# Patient Record
Sex: FEMALE | Race: BLACK OR AFRICAN AMERICAN | Employment: UNEMPLOYED | URBAN - METROPOLITAN AREA
[De-identification: names, ages, dates, MRNs, and addresses within clinical notes are randomized per-mention and may not be internally consistent; named-entity substitution may affect disease eponyms.]

---

## 2017-01-12 ENCOUNTER — OFFICE VISIT (OUTPATIENT)
Dept: FAMILY MEDICINE CLINIC | Age: 52
End: 2017-01-12

## 2017-01-12 VITALS
HEIGHT: 65 IN | TEMPERATURE: 97.2 F | DIASTOLIC BLOOD PRESSURE: 84 MMHG | SYSTOLIC BLOOD PRESSURE: 140 MMHG | OXYGEN SATURATION: 98 % | WEIGHT: 165 LBS | RESPIRATION RATE: 18 BRPM | BODY MASS INDEX: 27.49 KG/M2 | HEART RATE: 85 BPM

## 2017-01-12 DIAGNOSIS — I10 ESSENTIAL HYPERTENSION WITH GOAL BLOOD PRESSURE LESS THAN 140/90: ICD-10-CM

## 2017-01-12 DIAGNOSIS — Z12.39 ENCOUNTER FOR SCREENING BREAST EXAMINATION: ICD-10-CM

## 2017-01-12 DIAGNOSIS — Z12.11 SCREEN FOR COLON CANCER: ICD-10-CM

## 2017-01-12 DIAGNOSIS — Z86.73 HISTORY OF CVA (CEREBROVASCULAR ACCIDENT): ICD-10-CM

## 2017-01-12 DIAGNOSIS — E78.5 HYPERLIPIDEMIA, UNSPECIFIED HYPERLIPIDEMIA TYPE: ICD-10-CM

## 2017-01-12 DIAGNOSIS — E11.9 TYPE 2 DIABETES MELLITUS WITHOUT COMPLICATION, WITHOUT LONG-TERM CURRENT USE OF INSULIN (HCC): Primary | ICD-10-CM

## 2017-01-12 RX ORDER — ATORVASTATIN CALCIUM 80 MG/1
80 TABLET, FILM COATED ORAL DAILY
Qty: 30 TAB | Refills: 3 | Status: SHIPPED | OUTPATIENT
Start: 2017-01-12 | End: 2017-02-23 | Stop reason: SDUPTHER

## 2017-01-12 RX ORDER — LOSARTAN POTASSIUM 100 MG/1
100 TABLET ORAL DAILY
Qty: 30 TAB | Refills: 3 | Status: SHIPPED | OUTPATIENT
Start: 2017-01-12 | End: 2017-02-23 | Stop reason: SDUPTHER

## 2017-01-12 RX ORDER — ASPIRIN 81 MG/1
81 TABLET ORAL DAILY
Qty: 30 TAB | Refills: 3 | Status: SHIPPED | OUTPATIENT
Start: 2017-01-12 | End: 2017-02-23 | Stop reason: SDUPTHER

## 2017-01-12 NOTE — PROGRESS NOTES
Chief Complaint   Patient presents with    Hypertension    Diabetes     Patient in for DM and HTN follow-up. 1. Have you been to the ER, urgent care clinic since your last visit? Hospitalized since your last visit? No    2. Have you seen or consulted any other health care providers outside of the [address] [place] [address] since your last visit? Include any pap smears or colon screening. No    HPI  Michelet Saha comes in for f/u care. 1) DM2: Patient is on janumet. She has not been very compliant with medication and has been out of these for 1 week. This is due to financial reasons. Would like prescription sent in to her pharmacy. 2) HTN: On cozaar, has been out of medication. I will refill this. Check CMP today. 3) Dyslipidemia: check lipid panel and refill medication. Past Medical History  Past Medical History   Diagnosis Date    Diabetes ([facility] [address]     Hyperlipidemia     Hypertension     Stroke [facility])      2012       Surgical History  No past surgical history on file. Medications  Current Outpatient Prescriptions   Medication Sig Dispense Refill    SITagliptin-metFORMIN (JANUMET XR) 100-1,000 mg TM24 Take 1 Tab by mouth daily. 30 Tab 3    losartan (COZAAR) 100 mg tablet Take 1 Tab by mouth daily. 30 Tab 3    atorvastatin (LIPITOR) 80 mg tablet Take 1 Tab by mouth daily. 30 Tab 3    aspirin delayed-release 81 mg tablet Take 1 Tab by mouth daily. 30 Tab 3       Allergies  No Known Allergies    Family History  No family history on file. Social History  Social History     Social History    Marital status: SINGLE     Spouse name: N/A    Number of children: N/A    Years of education: N/A     Occupational History    Not on file. Social History Main Topics    Smoking status: Current Every Day Smoker     Packs/day: 0.50     Types: Cigarettes    Smokeless tobacco: Never Used    Alcohol use 0.0 oz/week     0 Standard drinks or equivalent per week      Comment: occassionally     Drug use:  No  Sexual activity: No     Other Topics Concern    Not on file     Social History Narrative       Review of Systems  Review of Systems - History obtained from chart review and the patient  General ROS: negative for - chills or fever  Psychological ROS: negative  Ophthalmic ROS: negative  ENT ROS: negative for - nasal congestion or nasal discharge  Endocrine ROS: DM2  Respiratory ROS: no cough, shortness of breath, or wheezing  Cardiovascular ROS: no chest pain or dyspnea on exertion  Gastrointestinal ROS: no abdominal pain, change in bowel habits, or black or bloody stools  Genito-Urinary ROS: no dysuria, trouble voiding, or hematuria  Musculoskeletal ROS: negative  Neurological ROS: no TIA or stroke symptoms    Vital Signs  Visit Vitals    /84 (BP 1 Location: Left arm, BP Patient Position: Sitting)    Pulse 85    Temp 97.2 °F (36.2 °C) (Oral)    Resp 18    Ht 5' 5\" (1.651 m)    Wt 165 lb (74.8 kg)    SpO2 98%    BMI 27.46 kg/m2         Physical Exam  Physical Examination: General appearance - oriented to person, place, and time, normal appearing weight, acyanotic, in no respiratory distress and well hydrated  Mental status - alert, oriented to person, place, and time, normal mood, behavior, speech, dress, motor activity, and thought processes  Eyes - sclera anicteric  Mouth - mucous membranes moist, pharynx normal without lesions  Chest - clear to auscultation, no wheezes, rales or rhonchi, symmetric air entry, no tachypnea, retractions or cyanosis  Heart - normal rate, regular rhythm, normal S1, S2, no murmurs, rubs, clicks or gallops  Neurological - alert, oriented, normal speech, no focal findings or movement disorder noted, motor and sensory grossly normal bilaterally  Musculoskeletal - no joint tenderness, deformity or swelling  Extremities - no pedal edema noted      Diagnostics  Orders Placed This Encounter    [facility] BI SCREENING INCL CAD     Standing Status:   Future     Standing Expiration Date:   2/12/2018     Order Specific Question:   Reason for Exam     Answer:   breast ca screening    OCCULT BLOOD, IMMUNOASSAY (FIT)     Standing Status:   Future     Standing Expiration Date:   8/37/8595    METABOLIC PANEL, COMPREHENSIVE     Standing Status:   Future     Standing Expiration Date:   1/13/2018    HEMOGLOBIN A1C WITH EAG     Standing Status:   Future     Standing Expiration Date:   1/13/2018    LIPID PANEL     Standing Status:   Future     Standing Expiration Date:   1/13/2018    CBC WITH AUTOMATED DIFF     Standing Status:   Future     Standing Expiration Date:   1/13/2018    REFERRAL TO OPHTHALMOLOGY     Referral Priority:   Routine     Referral Type:   Consultation     Referral Reason:   Specialty Services Required    SITagliptin-metFORMIN (JANUMET XR) 100-1,000 mg TM24     Sig: Take 1 Tab by mouth daily. Dispense:  30 Tab     Refill:  3    losartan (COZAAR) 100 mg tablet     Sig: Take 1 Tab by mouth daily. Dispense:  30 Tab     Refill:  3    atorvastatin (LIPITOR) 80 mg tablet     Sig: Take 1 Tab by mouth daily. Dispense:  30 Tab     Refill:  3    aspirin delayed-release 81 mg tablet     Sig: Take 1 Tab by mouth daily. Dispense:  30 Tab     Refill:  3         Results  Results for orders placed or performed in visit on 10/12/16   CBC WITH AUTOMATED DIFF   Result Value Ref Range    WBC 10.6 3.4 - 10.8 x10E3/uL    RBC 4.55 3.77 - 5.28 x10E6/uL    HGB 11.8 11.1 - 15.9 g/dL    HCT 35.9 34.0 - 46.6 %    MCV 79 79 - 97 fL    MCH 25.9 (L) 26.6 - 33.0 pg    MCHC 32.9 31.5 - 35.7 g/dL    RDW 15.7 (H) 12.3 - 15.4 %    PLATELET 678 239 - 596 x10E3/uL    NEUTROPHILS 62 %    Lymphocytes 29 %    MONOCYTES 8 %    EOSINOPHILS 1 %    BASOPHILS 0 %    ABS. NEUTROPHILS 6.6 1.4 - 7.0 x10E3/uL    Abs Lymphocytes 3.1 0.7 - 3.1 x10E3/uL    ABS. MONOCYTES 0.9 0.1 - 0.9 x10E3/uL    ABS. EOSINOPHILS 0.1 0.0 - 0.4 x10E3/uL    ABS.  BASOPHILS 0.0 0.0 - 0.2 x10E3/uL    IMMATURE GRANULOCYTES 0 %    ABS. IMM. GRANS. 0.0 0.0 - 0.1 x10E3/uL   HEMOGLOBIN A1C W/O EAG   Result Value Ref Range    Hemoglobin A1c 7.4 (H) 4.8 - 5.6 %   METABOLIC PANEL, COMPREHENSIVE   Result Value Ref Range    Glucose 119 (H) 65 - 99 mg/dL    BUN 5 (L) 6 - 24 mg/dL    Creatinine 0.47 (L) 0.57 - 1.00 mg/dL    GFR est non- >59 mL/min/1.73    GFR est  >59 mL/min/1.73    BUN/Creatinine ratio 11 9 - 23    Sodium 139 134 - 144 mmol/L    Potassium 4.2 3.5 - 5.2 mmol/L    Chloride 98 97 - 108 mmol/L    CO2 23 18 - 29 mmol/L    Calcium 9.7 8.7 - 10.2 mg/dL    Protein, total 7.2 6.0 - 8.5 g/dL    Albumin 4.1 3.5 - 5.5 g/dL    GLOBULIN, TOTAL 3.1 1.5 - 4.5 g/dL    A-G Ratio 1.3 1.1 - 2.5    Bilirubin, total 0.5 0.0 - 1.2 mg/dL    Alk. phosphatase 100 39 - 117 IU/L    AST 10 0 - 40 IU/L    ALT 7 0 - 32 IU/L   MICROALBUMIN, UR, RAND W/ MICROALBUMIN/CREA RATIO   Result Value Ref Range    Creatinine, urine 99.6 Not Estab. mg/dL    Microalbumin, urine 13.4 Not Estab. ug/mL    Microalb/Creat ratio (ug/mg creat.) 13.5 0.0 - 30.0 mg/g creat       ASSESSMENT and PLAN    ICD-10-CM ICD-9-CM    1. Type 2 diabetes mellitus without complication, without long-term current use of insulin (HCC) E11.9 250.00 SITagliptin-metFORMIN (JANUMET XR) 100-1,000 mg TM24      aspirin delayed-release 81 mg tablet      REFERRAL TO OPHTHALMOLOGY      METABOLIC PANEL, COMPREHENSIVE      HEMOGLOBIN A1C WITH EAG      LIPID PANEL      CBC WITH AUTOMATED DIFF      METABOLIC PANEL, COMPREHENSIVE      HEMOGLOBIN A1C WITH EAG      LIPID PANEL      CBC WITH AUTOMATED DIFF   2. Essential hypertension with goal blood pressure less than 140/90 I10 401.9 losartan (COZAAR) 100 mg tablet   3. Hyperlipidemia, unspecified hyperlipidemia type E78.5 272.4 atorvastatin (LIPITOR) 80 mg tablet      LIPID PANEL   4. History of CVA (cerebrovascular accident) Z86.73 V12.54 aspirin delayed-release 81 mg tablet   5.  Encounter for screening breast examination Z12.39 V76.10 [facility] MAMMO BI SCREENING INCL CAD   10. Screen for colon cancer Z12.11 V76.51 OCCULT BLOOD, IMMUNOASSAY (FIT)      OCCULT BLOOD, IMMUNOASSAY (FIT)     lab results and schedule of future lab studies reviewed with patient  reviewed diet, exercise and weight control  very strongly urged to quit smoking to reduce cardiovascular risk  cardiovascular risk and specific lipid/LDL goals reviewed  reviewed medications and side effects in detail  specific diabetic recommendations: low cholesterol diet, weight control and daily exercise discussed, home glucose monitoring emphasized, all medications, side effects and compliance discussed carefully, glycohemoglobin and other lab monitoring discussed, long term diabetic complications discussed and patient urged in the strongest terms to quit smoking      I have discussed the diagnosis with the patient and the intended plan of care as seen in the above orders. The patient has received an after-visit summary and questions were answered concerning future plans. I have discussed medication, side effects, and warnings with the patient in detail. The patient verbalized understanding and is in agreement with the plan of care. The patient will contact the office with any additional concerns.     Caro Rouse MD

## 2017-01-12 NOTE — MR AVS SNAPSHOT
Visit Information Date & Time Provider Department Dept. Phone Encounter #  
 1/12/2017 10:00 AM Estuardoed Marvin Ambriz MD [facility] [phone] [id number] Follow-up Instructions Return in about 6 weeks (around 2/23/2017), or if symptoms worsen or fail to improve, for dm2, htn, smoking cessation. Upcoming Health Maintenance Date Due FOBT Q 1 YEAR AGE 50-75 9/20/2015 BREAST CANCER SCRN MAMMOGRAM 1/22/2016 LIPID PANEL Q1 11/12/2016 EYE EXAM RETINAL OR DILATED Q1 12/17/2016 HEMOGLOBIN A1C Q6M 4/12/2017 FOOT EXAM Q1 10/12/2017 MICROALBUMIN Q1 10/12/2017 PAP AKA CERVICAL CYTOLOGY 1/7/2018 DTaP/Tdap/Td series (2 - Td) 7/12/2026 Allergies as of 1/12/2017  Review Complete On: 1/12/2017 By: Pearl Lopez MD  
 No Known Allergies Current Immunizations  Reviewed on 11/15/2016 Name Date Influenza Vaccine (Quad) PF 10/12/2016 Pneumococcal Polysaccharide (PPSV-23) 10/12/2016 Tdap 7/12/2016 Not reviewed this visit You Were Diagnosed With   
  
 Codes Comments Encounter for screening breast examination    -  Primary ICD-10-CM: Z12.39 
ICD-9-CM: V76.10 Type 2 diabetes mellitus without complication, without long-term current use of insulin (HCC)     ICD-10-CM: E11.9 ICD-9-CM: 250.00 Essential hypertension with goal blood pressure less than 140/90     ICD-10-CM: I10 
ICD-9-CM: 401.9 Hyperlipidemia, unspecified hyperlipidemia type     ICD-10-CM: E78.5 ICD-9-CM: 272.4 History of CVA (cerebrovascular accident)     ICD-10-CM: Z86.73 
ICD-9-CM: V12.54 Screen for colon cancer     ICD-10-CM: Z12.11 ICD-9-CM: V76.51 Other hyperlipidemia     ICD-10-CM: E78.4 ICD-9-CM: 272.4 Vitals BP Pulse Temp Resp Height(growth percentile) Weight(growth percentile) 140/84 (BP 1 Location: Left arm, BP Patient Position: Sitting) 85 97.2 °F (36.2 °C) (Oral) 18 5' 5\" (1.651 m) 165 lb (74.8 kg) SpO2 BMI OB Status Smoking Status 98% 27.46 kg/m2 Having regular periods Current Every Day Smoker Vitals History BMI and BSA Data Body Mass Index Body Surface Area  
 27.46 kg/m 2 1.85 m 2 Preferred Pharmacy Pharmacy Name Phone [facility] [address] [place], [place] [address] [place] [phone] Your Updated Medication List  
  
   
This list is accurate as of: 1/12/17 10:28 AM.  Always use your most recent med list.  
  
  
  
  
 aspirin delayed-release 81 mg tablet Take 1 Tab by mouth daily. atorvastatin 80 mg tablet Commonly known as:  LIPITOR Take 1 Tab by mouth daily. losartan 100 mg tablet Commonly known as:  COZAAR Take 1 Tab by mouth daily. SITagliptin-metFORMIN 100-1,000 mg Tm24 Commonly known as:  JANUMET XR Take 1 Tab by mouth daily. Prescriptions Sent to Pharmacy Refills SITagliptin-metFORMIN (JANUMET XR) 100-1,000 mg TM24 3 Sig: Take 1 Tab by mouth daily. Class: Normal  
 Pharmacy: [facility] [employer] [facility] [address] [place] [address] Ph #: [phone] Route: Oral  
 losartan (COZAAR) 100 mg tablet 3 Sig: Take 1 Tab by mouth daily. Class: Normal  
 Pharmacy: [facility] [address] [place] [address] Ph #: [phone] Route: Oral  
 atorvastatin (LIPITOR) 80 mg tablet 3 Sig: Take 1 Tab by mouth daily. Class: Normal  
 Pharmacy: [facility] [address] [place] [address] Ph #: [phone] Route: Oral  
 aspirin delayed-release 81 mg tablet 3 Sig: Take 1 Tab by mouth daily. Class: Normal  
 Pharmacy: [facility] [address] [place] [address] Ph #: [phone] Route: Oral  
  
We Performed the Following REFERRAL TO OPHTHALMOLOGY [REF57 Custom] Comments:  
 Please evaluate patient for diabetes eye check. Follow-up Instructions Return in about 6 weeks (around 2/23/2017), or if symptoms worsen or fail to improve, for dm2, htn, smoking cessation. To-Do List   
 01/12/2017 Lab:  CBC WITH AUTOMATED DIFF   
  
 01/12/2017 Lab:  HEMOGLOBIN A1C WITH EAG   
  
 01/12/2017 Lab:  LIPID PANEL   
  
 01/12/2017 Imaging:  [facility] BI SCREENING INCL CAD   
  
 01/12/2017 Lab:  METABOLIC PANEL, COMPREHENSIVE   
  
 01/12/2017 Lab:  OCCULT BLOOD, IMMUNOASSAY (FIT) Referral Information Referral ID Referred By Referred To  
  
 [id number] Aiyana Sullivan N Not Available Visits Status Start Date End Date 1 New Request 1/12/17 1/12/18 If your referral has a status of pending review or denied, additional information will be sent to support the outcome of this decision. Patient Instructions Noninsulin Medicines for Type 2 Diabetes: Care Instructions Your Care Instructions There are different types of noninsulin medicines for diabetes. Each works in a different way to help you control your blood sugar. Some types help your body make insulin to lower your blood sugar. Others lower how much insulin your body needs. Some can slow how quickly your body digests sugars. And some can remove extra glucose through your urine. Some of these medicines are: · Alpha-glucosidase inhibitors. These keep starches from breaking down. This means that they lower the amount of glucose absorbed when you eat. They do not help your body make more insulin. So they will not cause low blood sugar unless they are used with other medicines for diabetes. They include acarbose and miglitol. · DPP-4 inhibitors. These help your body increase the level of insulin after eating.  They also help your body make less of a hormone that raises blood sugar. They include linagliptin, saxagliptin, and sitagliptin. · Incretin hormones (GLP-1 receptor agonists). Your body makes a protein that can raise your insulin level, lower your blood sugar, and make you less hungry. You can inject hormone medicines that work the same way as this protein. They include exenatide and liraglutide. · Meglitinides. These help your body release insulin. They also help slow how your body digests sugars. In this way, they prevent your blood sugar from rising too fast after you eat. They include nateglinide and repaglinide. · Metformin. This lowers how much glucose your liver makes. And it helps you respond better to insulin. It also lowers the amount of stored sugar that your liver releases when you are not eating. · Sodium glucose co-transporter 2 inhibitors (SGLT2 inhibitors). These help to remove extra glucose through your urine. They may also help some people lose weight. They include canagliflozin, dapagliflozin, and empagliflozin. · Sulfonylureas. These help your body release more insulin. Some work for many hours and can cause low blood sugar if you don't eat as you expected. They include glipizide and glyburide. · Thiazolidinediones. These reduce the amount of blood glucose. They also help you respond better to insulin. They include pioglitazone and rosiglitazone. You may need to take more than one medicine for diabetes. Two or more medicines may work better to lower your blood sugar level than just one medicine. Follow-up care is a key part of your treatment and safety. Be sure to make and go to all appointments, and call your doctor if you are having problems. It's also a good idea to know your test results and keep a list of the medicines you take. How can you care for yourself at home? · Eat a healthy diet. Get some exercise each day. This may help you to reduce how much medicine you need. · Do not take other prescription or over-the-counter medicines, vitamins, herbal products, or supplements without talking to your doctor first. Some medicines for type 2 diabetes can cause problems with other medicines or supplements. · Tell your doctor if you plan to get pregnant. Some of these drugs are not safe for pregnant women. · Be safe with medicines. Take your medicines exactly as prescribed. Meglitinides or sulfonylureas can cause your blood sugar to drop very low. Call your doctor if you think you are having a problem with your medicine. · Check your blood sugar levels often. You can use a glucose monitor. Keeping track can help you know how certain foods, activities, and medicines affect your blood sugar. And it can help you keep your blood sugar from getting so low that it's not safe. When should you call for help? Call 911 anytime you think you may need emergency care. For example, call if: 
· You passed out (lost consciousness), or you suddenly become very sleepy or confused. (You may have very low blood sugar.) · You have symptoms of high blood sugar, such as: ¨ Blurred vision. ¨ Trouble staying awake or being woken up. ¨ Fast, deep breathing. ¨ Breath that smells fruity. ¨ Belly pain, not feeling hungry, and vomiting. ¨ Feeling confused. Call your doctor now or seek immediate medical care if: 
· You are sick and cannot control your blood sugar. · You have been vomiting or have had diarrhea for more than 6 hours. · Your blood sugar stays higher than the level your doctor has set for you. · You have symptoms of low blood sugar, such as: ¨ Sweating. ¨ Feeling nervous, shaky, and weak. ¨ Extreme hunger and slight nausea. ¨ Dizziness and headache. ¨ Blurred vision. ¨ Confusion. Watch closely for changes in your health, and be sure to contact your doctor if: 
· You have a hard time knowing when your blood sugar is low. · You have trouble keeping your blood sugar in the target range. · You often have problems controlling your blood sugar. · You have symptoms of long-term diabetes problems, such as: ¨ New vision changes. ¨ New pain, numbness, or tingling in your hands or feet. ¨ Skin problems. Where can you learn more? Go to http://kurt-lam.info/. Enter H153 in the search box to learn more about \"Noninsulin Medicines for Type 2 Diabetes: Care Instructions. \" Current as of: August 3, 2016 Content Version: 11.1 © 1332-0348 [employer]. Care instructions adapted under license by [employer] (which disclaims liability or warranty for this information). If you have questions about a medical condition or this instruction, always ask your healthcare professional. [address] any warranty or liability for your use of this information. Introducing hospitals & HEALTH SERVICES! Mehreen Howell introduces CareDox patient portal. Now you can access parts of your medical record, email your doctor's office, and request medication refills online. 1. In your internet browser, go to [email or web address] 2. Click on the First Time User? Click Here link in the Sign In box. You will see the New Member Sign Up page. 3. Enter your CareDox Access Code exactly as it appears below. You will not need to use this code after youve completed the sign-up process. If you do not sign up before the expiration date, you must request a new code. · Care[employer]ox Access Code: [email or web address] Expires: 4/12/2017  9:20 AM 
 
4. Enter the last four digits of your Social Security Number (xxxx) and Date of Birth (mm/dd/yyyy) as indicated and click Submit. You will be taken to the next sign-up page. 5. Create a CareDox ID. This will be your [employer]Dox login ID and cannot be changed, so think of one that is secure and easy to remember. 6. Create a Telepathy password. You can change your password at any time. 7. Enter your Password Reset Question and Answer. This can be used at a later time if you forget your password. 8. Enter your e-mail address. You will receive e-mail notification when new information is available in [address] 9. Click Sign Up. You can now view and download portions of your medical record. 10. Click the Download Summary menu link to download a portable copy of your medical information. If you have questions, please visit the Frequently Asked Questions section of the Telepathy website. Remember, Telepathy is NOT to be used for urgent needs. For medical emergencies, dial 911. Now available from your iPhone and Android! Please provide this summary of care documentation to your next provider. Your primary care clinician is listed as Carrillo Alex. If you have any questions after today's visit, please call [phone].

## 2017-01-12 NOTE — PATIENT INSTRUCTIONS
Noninsulin Medicines for Type 2 Diabetes: Care Instructions  Your Care Instructions  There are different types of noninsulin medicines for diabetes. Each works in a different way to help you control your blood sugar. Some types help your body make insulin to lower your blood sugar. Others lower how much insulin your body needs. Some can slow how quickly your body digests sugars. And some can remove extra glucose through your urine. Some of these medicines are:  · Alpha-glucosidase inhibitors. These keep starches from breaking down. This means that they lower the amount of glucose absorbed when you eat. They do not help your body make more insulin. So they will not cause low blood sugar unless they are used with other medicines for diabetes. They include acarbose and miglitol. · DPP-4 inhibitors. These help your body increase the level of insulin after eating. They also help your body make less of a hormone that raises blood sugar. They include linagliptin, saxagliptin, and sitagliptin. · Incretin hormones (GLP-1 receptor agonists). Your body makes a protein that can raise your insulin level, lower your blood sugar, and make you less hungry. You can inject hormone medicines that work the same way as this protein. They include exenatide and liraglutide. · Meglitinides. These help your body release insulin. They also help slow how your body digests sugars. In this way, they prevent your blood sugar from rising too fast after you eat. They include nateglinide and repaglinide. · Metformin. This lowers how much glucose your liver makes. And it helps you respond better to insulin. It also lowers the amount of stored sugar that your liver releases when you are not eating. · Sodium glucose co-transporter 2 inhibitors (SGLT2 inhibitors). These help to remove extra glucose through your urine. They may also help some people lose weight. They include canagliflozin, dapagliflozin, and empagliflozin. · Sulfonylureas. These help your body release more insulin. Some work for many hours and can cause low blood sugar if you don't eat as you expected. They include glipizide and glyburide. · Thiazolidinediones. These reduce the amount of blood glucose. They also help you respond better to insulin. They include pioglitazone and rosiglitazone. You may need to take more than one medicine for diabetes. Two or more medicines may work better to lower your blood sugar level than just one medicine. Follow-up care is a key part of your treatment and safety. Be sure to make and go to all appointments, and call your doctor if you are having problems. It's also a good idea to know your test results and keep a list of the medicines you take. How can you care for yourself at home? · Eat a healthy diet. Get some exercise each day. This may help you to reduce how much medicine you need. · Do not take other prescription or over-the-counter medicines, vitamins, herbal products, or supplements without talking to your doctor first. Some medicines for type 2 diabetes can cause problems with other medicines or supplements. · Tell your doctor if you plan to get pregnant. Some of these drugs are not safe for pregnant women. · Be safe with medicines. Take your medicines exactly as prescribed. Meglitinides or sulfonylureas can cause your blood sugar to drop very low. Call your doctor if you think you are having a problem with your medicine. · Check your blood sugar levels often. You can use a glucose monitor. Keeping track can help you know how certain foods, activities, and medicines affect your blood sugar. And it can help you keep your blood sugar from getting so low that it's not safe. When should you call for help? Call 911 anytime you think you may need emergency care. For example, call if:  · You passed out (lost consciousness), or you suddenly become very sleepy or confused.  (You may have very low blood sugar.)  · You have symptoms of high blood sugar, such as:  ¨ Blurred vision. ¨ Trouble staying awake or being woken up. ¨ Fast, deep breathing. ¨ Breath that smells fruity. ¨ Belly pain, not feeling hungry, and vomiting. ¨ Feeling confused. Call your doctor now or seek immediate medical care if:  · You are sick and cannot control your blood sugar. · You have been vomiting or have had diarrhea for more than 6 hours. · Your blood sugar stays higher than the level your doctor has set for you. · You have symptoms of low blood sugar, such as:  ¨ Sweating. ¨ Feeling nervous, shaky, and weak. ¨ Extreme hunger and slight nausea. ¨ Dizziness and headache. ¨ Blurred vision. ¨ Confusion. Watch closely for changes in your health, and be sure to contact your doctor if:  · You have a hard time knowing when your blood sugar is low. · You have trouble keeping your blood sugar in the target range. · You often have problems controlling your blood sugar. · You have symptoms of long-term diabetes problems, such as:  ¨ New vision changes. ¨ New pain, numbness, or tingling in your hands or feet. ¨ Skin problems. Where can you learn more? Go to http://kurt-lam.info/. Enter H153 in the search box to learn more about \"Noninsulin Medicines for Type 2 Diabetes: Care Instructions. \"  Current as of: August 3, 2016  Content Version: 11.1  © 4478-5434 [employer]. Care instructions adapted under license by [employer] (which disclaims liability or warranty for this information). If you have questions about a medical condition or this instruction, always ask your healthcare professional. [place] [address] any warranty or liability for your use of this information.

## 2017-01-13 LAB
ALBUMIN SERPL-MCNC: 4 G/DL (ref 3.5–5.5)
ALBUMIN/GLOB SERPL: 1.3 {RATIO} (ref 1.1–2.5)
ALP SERPL-CCNC: 94 IU/L (ref 39–117)
ALT SERPL-CCNC: 16 IU/L (ref 0–32)
AST SERPL-CCNC: 17 IU/L (ref 0–40)
BASOPHILS # BLD AUTO: 0 X10E3/UL (ref 0–0.2)
BASOPHILS NFR BLD AUTO: 0 %
BILIRUB SERPL-MCNC: 0.2 MG/DL (ref 0–1.2)
BUN SERPL-MCNC: 4 MG/DL (ref 6–24)
BUN/CREAT SERPL: 8 (ref 9–23)
CALCIUM SERPL-MCNC: 9.4 MG/DL (ref 8.7–10.2)
CHLORIDE SERPL-SCNC: 100 MMOL/L (ref 96–106)
CHOLEST SERPL-MCNC: 177 MG/DL (ref 100–199)
CO2 SERPL-SCNC: 26 MMOL/L (ref 18–29)
CREAT SERPL-MCNC: 0.52 MG/DL (ref 0.57–1)
EOSINOPHIL # BLD AUTO: 0.1 X10E3/UL (ref 0–0.4)
EOSINOPHIL NFR BLD AUTO: 1 %
ERYTHROCYTE [DISTWIDTH] IN BLOOD BY AUTOMATED COUNT: 14.5 % (ref 12.3–15.4)
EST. AVERAGE GLUCOSE BLD GHB EST-MCNC: 163 MG/DL
GLOBULIN SER CALC-MCNC: 3 G/DL (ref 1.5–4.5)
GLUCOSE SERPL-MCNC: 140 MG/DL (ref 65–99)
HBA1C MFR BLD: 7.3 % (ref 4.8–5.6)
HCT VFR BLD AUTO: 35.9 % (ref 34–46.6)
HDLC SERPL-MCNC: 51 MG/DL
HGB BLD-MCNC: 11.6 G/DL (ref 11.1–15.9)
IMM GRANULOCYTES # BLD: 0 X10E3/UL (ref 0–0.1)
IMM GRANULOCYTES NFR BLD: 0 %
INTERPRETATION, 910389: NORMAL
LDLC SERPL CALC-MCNC: 114 MG/DL (ref 0–99)
LYMPHOCYTES # BLD AUTO: 3.9 X10E3/UL (ref 0.7–3.1)
LYMPHOCYTES NFR BLD AUTO: 36 %
MCH RBC QN AUTO: 25.4 PG (ref 26.6–33)
MCHC RBC AUTO-ENTMCNC: 32.3 G/DL (ref 31.5–35.7)
MCV RBC AUTO: 79 FL (ref 79–97)
MONOCYTES # BLD AUTO: 0.7 X10E3/UL (ref 0.1–0.9)
MONOCYTES NFR BLD AUTO: 7 %
NEUTROPHILS # BLD AUTO: 6.1 X10E3/UL (ref 1.4–7)
NEUTROPHILS NFR BLD AUTO: 56 %
PLATELET # BLD AUTO: 346 X10E3/UL (ref 150–379)
POTASSIUM SERPL-SCNC: 3.7 MMOL/L (ref 3.5–5.2)
PROT SERPL-MCNC: 7 G/DL (ref 6–8.5)
RBC # BLD AUTO: 4.57 X10E6/UL (ref 3.77–5.28)
SODIUM SERPL-SCNC: 142 MMOL/L (ref 134–144)
TRIGL SERPL-MCNC: 59 MG/DL (ref 0–149)
VLDLC SERPL CALC-MCNC: 12 MG/DL (ref 5–40)
WBC # BLD AUTO: 10.9 X10E3/UL (ref 3.4–10.8)

## 2017-01-17 NOTE — PROGRESS NOTES
Called patient, was unable to leave voicemail due to voicemail not being set up on the phone number provided. Will try again tomorrow.

## 2017-02-23 ENCOUNTER — OFFICE VISIT (OUTPATIENT)
Dept: FAMILY MEDICINE CLINIC | Age: 52
End: 2017-02-23

## 2017-02-23 VITALS
BODY MASS INDEX: 27.29 KG/M2 | DIASTOLIC BLOOD PRESSURE: 77 MMHG | OXYGEN SATURATION: 99 % | TEMPERATURE: 97.3 F | HEART RATE: 81 BPM | HEIGHT: 65 IN | RESPIRATION RATE: 18 BRPM | SYSTOLIC BLOOD PRESSURE: 129 MMHG | WEIGHT: 163.8 LBS

## 2017-02-23 DIAGNOSIS — Z86.73 HISTORY OF CVA (CEREBROVASCULAR ACCIDENT): ICD-10-CM

## 2017-02-23 DIAGNOSIS — Z00.00 ROUTINE GENERAL MEDICAL EXAMINATION AT A HEALTH CARE FACILITY: ICD-10-CM

## 2017-02-23 DIAGNOSIS — E78.5 HYPERLIPIDEMIA, UNSPECIFIED HYPERLIPIDEMIA TYPE: ICD-10-CM

## 2017-02-23 DIAGNOSIS — Z71.89 ACP (ADVANCE CARE PLANNING): ICD-10-CM

## 2017-02-23 DIAGNOSIS — I10 ESSENTIAL HYPERTENSION WITH GOAL BLOOD PRESSURE LESS THAN 140/90: ICD-10-CM

## 2017-02-23 DIAGNOSIS — E11.9 TYPE 2 DIABETES MELLITUS WITHOUT COMPLICATION, WITHOUT LONG-TERM CURRENT USE OF INSULIN (HCC): Primary | ICD-10-CM

## 2017-02-23 DIAGNOSIS — F17.200 TOBACCO USE DISORDER: ICD-10-CM

## 2017-02-23 DIAGNOSIS — Z13.39 SCREENING FOR ALCOHOLISM: ICD-10-CM

## 2017-02-23 RX ORDER — LOSARTAN POTASSIUM 100 MG/1
100 TABLET ORAL DAILY
Qty: 30 TAB | Refills: 3 | Status: SHIPPED | OUTPATIENT
Start: 2017-02-23 | End: 2017-05-23 | Stop reason: SDUPTHER

## 2017-02-23 RX ORDER — ATORVASTATIN CALCIUM 80 MG/1
80 TABLET, FILM COATED ORAL DAILY
Qty: 30 TAB | Refills: 3 | Status: SHIPPED | OUTPATIENT
Start: 2017-02-23 | End: 2017-05-23 | Stop reason: SDUPTHER

## 2017-02-23 RX ORDER — ASPIRIN 81 MG/1
81 TABLET ORAL DAILY
Qty: 30 TAB | Refills: 3 | Status: SHIPPED | OUTPATIENT
Start: 2017-02-23 | End: 2017-05-23 | Stop reason: SDUPTHER

## 2017-02-23 NOTE — ACP (ADVANCE CARE PLANNING)
Advance Care Planning (ACP) Provider Conversation Snapshot    Date of ACP Conversation: 02/23/17  Persons included in Conversation:  patient  Length of ACP Conversation in minutes:  16 minutes    Authorized Decision Maker (if patient is incapable of making informed decisions): This person is:   Patient has son who would be her POA. She is going to talk to him about it today. she has forms also from [place] for the same. fo rnow would prefe rot be full code.           For Patients with Decision Making Capacity:   Values/Goals: Exploration of values, goals, and preferences if recovery is not expected, even with continued medical treatment in the event of:  Imminent death  Severe, permanent brain injury    Conversation Outcomes / Follow-Up Plan:   Recommended completion of Advance Directive form after review of ACP materials and conversation with prospective healthcare agent      Carrillo Ruiz MD

## 2017-02-23 NOTE — PROGRESS NOTES
HPI  Twyla Contreras comes in for f/u care. 1) DM2: On janumet, would like refill of medication. Her home blood glucose numbers are 130's she says. 2) HTN: Stable on medication. Will refill the cozaar. 3) Dyslipidemia: on lipitor, would like refill. 4) Tobacco use d/o: discussed options to help her quit, says she will quit cold turkey and she at the moment has no money to by the cigarettes. Past Medical History  Past Medical History:   Diagnosis Date    Diabetes ([address]     Hyperlipidemia     Hypertension     Stroke [facility])     2012       Surgical History  No past surgical history on file. Medications  Current Outpatient Prescriptions   Medication Sig Dispense Refill    SITagliptin-metFORMIN (JANUMET XR) 100-1,000 mg TM24 Take 1 Tab by mouth daily. 30 Tab 3    losartan (COZAAR) 100 mg tablet Take 1 Tab by mouth daily. 30 Tab 3    atorvastatin (LIPITOR) 80 mg tablet Take 1 Tab by mouth daily. 30 Tab 3    aspirin delayed-release 81 mg tablet Take 1 Tab by mouth daily. 30 Tab 3       Allergies  No Known Allergies    Family History  No family history on file. Social History  Social History     Social History    Marital status: SINGLE     Spouse name: N/A    Number of children: N/A    Years of education: N/A     Occupational History    Not on file.      Social History Main Topics    Smoking status: Current Every Day Smoker     Packs/day: 0.50     Types: Cigarettes    Smokeless tobacco: Never Used    Alcohol use 0.0 oz/week     0 Standard drinks or equivalent per week      Comment: occassionally     Drug use: No    Sexual activity: No     Other Topics Concern    Not on file     Social History Narrative       Review of Systems  Review of Systems - History obtained from chart review and the patient  General ROS: negative for - chills, fatigue or fever  Psychological ROS: negative  Ophthalmic ROS: negative  Endocrine ROS: DM2  Respiratory ROS: no cough, shortness of breath, or wheezing  Cardiovascular ROS: no chest pain or dyspnea on exertion  Gastrointestinal ROS: no abdominal pain, change in bowel habits, or black or bloody stools  Neurological ROS: no TIA or stroke symptoms    Vital Signs  Visit Vitals    /81 (BP 1 Location: Left arm, BP Patient Position: Sitting)    Pulse 81    Temp 97.3 °F (36.3 °C) (Temporal)    Resp 18    Ht 5' 5\" (1.651 m)    Wt 163 lb 12.8 oz (74.3 kg)    LMP 02/17/2017 (Exact Date)    SpO2 99%    BMI 27.26 kg/m2         Physical Exam  Physical Examination: General appearance - oriented to person, place, and time, acyanotic, in no respiratory distress and well hydrated  Mental status - alert, oriented to person, place, and time, affect appropriate to mood  Chest - clear to auscultation, no wheezes, rales or rhonchi, symmetric air entry, no tachypnea, retractions or cyanosis  Heart - normal rate, regular rhythm, normal S1, S2, no murmurs, rubs, clicks or gallops  Neurological - alert, oriented, normal speech, no focal findings or movement disorder noted  Musculoskeletal - full range of motion without pain    Diagnostics  No orders of the defined types were placed in this encounter. Results  Results for orders placed or performed in visit on 83/08/84   METABOLIC PANEL, COMPREHENSIVE   Result Value Ref Range    Glucose 140 (H) 65 - 99 mg/dL    BUN 4 (L) 6 - 24 mg/dL    Creatinine 0.52 (L) 0.57 - 1.00 mg/dL    GFR est non- >59 mL/min/1.73    GFR est  >59 mL/min/1.73    BUN/Creatinine ratio 8 (L) 9 - 23    Sodium 142 134 - 144 mmol/L    Potassium 3.7 3.5 - 5.2 mmol/L    Chloride 100 96 - 106 mmol/L    CO2 26 18 - 29 mmol/L    Calcium 9.4 8.7 - 10.2 mg/dL    Protein, total 7.0 6.0 - 8.5 g/dL    Albumin 4.0 3.5 - 5.5 g/dL    GLOBULIN, TOTAL 3.0 1.5 - 4.5 g/dL    A-G Ratio 1.3 1.1 - 2.5    Bilirubin, total 0.2 0.0 - 1.2 mg/dL    Alk.  phosphatase 94 39 - 117 IU/L    AST (SGOT) 17 0 - 40 IU/L    ALT (SGPT) 16 0 - 32 IU/L   HEMOGLOBIN A1C WITH EAG   Result Value Ref Range    Hemoglobin A1c 7.3 (H) 4.8 - 5.6 %    Estimated average glucose 163 mg/dL   LIPID PANEL   Result Value Ref Range    Cholesterol, total 177 100 - 199 mg/dL    Triglyceride 59 0 - 149 mg/dL    HDL Cholesterol 51 >39 mg/dL    VLDL, calculated 12 5 - 40 mg/dL    LDL, calculated 114 (H) 0 - 99 mg/dL   CBC WITH AUTOMATED DIFF   Result Value Ref Range    WBC 10.9 (H) 3.4 - 10.8 x10E3/uL    RBC 4.57 3.77 - 5.28 x10E6/uL    HGB 11.6 11.1 - 15.9 g/dL    HCT 35.9 34.0 - 46.6 %    MCV 79 79 - 97 fL    MCH 25.4 (L) 26.6 - 33.0 pg    MCHC 32.3 31.5 - 35.7 g/dL    RDW 14.5 12.3 - 15.4 %    PLATELET 163 865 - 234 x10E3/uL    NEUTROPHILS 56 %    Lymphocytes 36 %    MONOCYTES 7 %    EOSINOPHILS 1 %    BASOPHILS 0 %    ABS. NEUTROPHILS 6.1 1.4 - 7.0 x10E3/uL    Abs Lymphocytes 3.9 (H) 0.7 - 3.1 x10E3/uL    ABS. MONOCYTES 0.7 0.1 - 0.9 x10E3/uL    ABS. EOSINOPHILS 0.1 0.0 - 0.4 x10E3/uL    ABS. BASOPHILS 0.0 0.0 - 0.2 x10E3/uL    IMMATURE GRANULOCYTES 0 %    ABS. IMM. GRANS. 0.0 0.0 - 0.1 x10E3/uL   CVD REPORT   Result Value Ref Range    INTERPRETATION Note      ASSESSMENT and PLAN  1. Type 2 diabetes mellitus without complication, without long-term current use of insulin (HCC)  - SITagliptin-metFORMIN (JANUMET XR) 100-1,000 mg TM24; Take 1 Tab by mouth daily. Dispense: 30 Tab; Refill: 3  - aspirin delayed-release 81 mg tablet; Take 1 Tab by mouth daily. Dispense: 30 Tab; Refill: 3    2. Essential hypertension with goal blood pressure less than 140/90  - losartan (COZAAR) 100 mg tablet; Take 1 Tab by mouth daily. Dispense: 30 Tab; Refill: 3    3. Hyperlipidemia, unspecified hyperlipidemia type  - atorvastatin (LIPITOR) 80 mg tablet; Take 1 Tab by mouth daily. Dispense: 30 Tab; Refill: 3    4. History of CVA (cerebrovascular accident)  - aspirin delayed-release 81 mg tablet; Take 1 Tab by mouth daily. Dispense: 30 Tab; Refill: 3    5.  Tobacco use disorder    current treatment plan is effective, no change in therapy  lab results and schedule of future lab studies reviewed with patient  reviewed diet, exercise and weight control  reviewed medications and side effects in detail  specific diabetic recommendations: low cholesterol diet, weight control and daily exercise discussed, home glucose monitoring emphasized, all medications, side effects and compliance discussed carefully and glycohemoglobin and other lab monitoring discussed  use of aspirin to prevent MI and TIA's discussed      I have discussed the diagnosis with the patient and the intended plan of care as seen in the above orders. The patient has received an after-visit summary and questions were answered concerning future plans. I have discussed medication, side effects, and warnings with the patient in detail. The patient verbalized understanding and is in agreement with the plan of care. The patient will contact the office with any additional concerns.     Enmanuel Castillo MD

## 2017-02-23 NOTE — PROGRESS NOTES
Chief Complaint   Patient presents with    Follow-up     DM2, HTN and smoking cessation      1. Have you been to the ER, urgent care clinic since your last visit? Hospitalized since your last visit? No    2. Have you seen or consulted any other health care providers outside of the [address] [place] [address] since your last visit? Include any pap smears or colon screening. No      Patient states blood sugars have been between 130-140s. Blood pressure has been stable. Smoking is improved. This is a Subsequent Medicare Annual Wellness Visit providing Personalized Prevention Plan Services (PPPS) (Performed 12 months after initial AWV and PPPS )    I have reviewed the patient's medical history in detail and updated the computerized patient record. History   Amalia Paz comes in for Yennifer Saxena visit. Past Medical History:   Diagnosis Date    [facility]     Hyperlipidemia     Hypertension     Stroke ([facility]     2012      No past surgical history on file. Current Outpatient Prescriptions   Medication Sig Dispense Refill    SITagliptin-metFORMIN (JANUMET XR) 100-1,000 mg TM24 Take 1 Tab by mouth daily. 30 Tab 3    losartan (COZAAR) 100 mg tablet Take 1 Tab by mouth daily. 30 Tab 3    atorvastatin (LIPITOR) 80 mg tablet Take 1 Tab by mouth daily. 30 Tab 3    aspirin delayed-release 81 mg tablet Take 1 Tab by mouth daily. 30 Tab 3     No Known Allergies  No family history on file.   Social History   Substance Use Topics    Smoking status: Current Every Day Smoker     Packs/day: 0.50     Types: Cigarettes    Smokeless tobacco: Never Used    Alcohol use 0.0 oz/week     0 Standard drinks or equivalent per week      Comment: occassionally      Patient Active Problem List   Diagnosis Code    Midline low back pain without sciatica M54.5    Tobacco use disorder F17.200    History of CVA (cerebrovascular accident) Z80.78    Essential hypertension I10    Hyperlipidemia E78.5    [facility] Q94.1    Type 2 diabetes mellitus without complication, without long-term current use of insulin (HCC) E11.9       Depression Risk Factor Screening:     PHQ 2 / 9, over the last two weeks 1/12/2017   Little interest or pleasure in doing things Not at all   Feeling down, depressed or hopeless Not at all   Total Score PHQ 2 0     Alcohol Risk Factor Screening: On any occasion during the past 3 months, have you had more than 3 drinks containing alcohol? No    Do you average more than 7 drinks per week? No      Functional Ability and Level of Safety:     Hearing Loss   None  Passed whisper test   Activities of Daily Living   Self-care. Requires assistance with: no ADLs    Fall Risk   No flowsheet data found. Abuse Screen   Patient is not abused    Review of Systems   Pertinent items are noted in HPI. Physical Examination     Evaluation of Cognitive Function:  Mood/affect:  neutral  Appearance: age appropriate and casually dressed  Family member/caregiver input: 0    Visit Vitals    /77 (BP 1 Location: Left arm, BP Patient Position: Sitting)    Pulse 81    Temp 97.3 °F (36.3 °C) (Temporal)    Resp 18    Ht 5' 5\" (1.651 m)    Wt 163 lb 12.8 oz (74.3 kg)    LMP 02/17/2017 (Exact Date)    SpO2 99%    BMI 27.26 kg/m2     General appearance: alert, cooperative, no distress, appears stated age  Lungs: clear to auscultation bilaterally  Heart: regular rate and rhythm, S1, S2 normal, no murmur, click, rub or gallop  Pulses: 2+ and symmetric  Neurologic: Grossly normal    Patient Care Team:  Donya Berrios MD as PCP - General (Family Practice)  Nelia Willis MD (Family Practice)    Advice/Referrals/Counseling   Education and counseling provided:  Are appropriate based on today's review and evaluation  End-of-Life planning (with patient's consent)    Assessment/Plan     1. Routine general medical examination at a health care facility    2.  Screening for alcoholism    3. ACP (advance care planning)    current treatment plan is effective, no change in therapy  lab results and schedule of future lab studies reviewed with patient  reviewed diet, exercise and weight control  reviewed medications and side effects in detail. I have discussed the diagnosis with the patient and the intended plan of care as seen in the above orders. The patient has received an after-visit summary and questions were answered concerning future plans. I have discussed medication, side effects, and warnings with the patient in detail. The patient verbalized understanding and is in agreement with the plan of care. The patient will contact the office with any additional concerns.     Enmanuel Castillo MD

## 2017-02-23 NOTE — PATIENT INSTRUCTIONS
Medicare Part B Preventive Services Limitations Recommendation Scheduled   Bone Mass Measurement  (age 72 & older, biennial) Requires diagnosis related to osteoporosis or estrogen deficiency. Biennial benefit unless patient has history of long-term glucocorticoid tx or baseline is needed because initial test was by other method N/a     Cardiovascular Screening Blood Tests (every 5 years)  Total cholesterol, HDL, Triglycerides Order as a panel if possible UTD 1/12/17    Colorectal Cancer Screening  -Fecal occult blood test (annual)  -Flexible sigmoidoscopy (5y)  -Screening colonoscopy (10y)  -Barium Enema  UTD patient brought FOBT back today     Counseling to Prevent Tobacco Use (up to 8 sessions per year)  - Counseling greater than 3 and up to 10 minutes  - Counseling greater than 10 minutes Patients must be asymptomatic of tobacco-related conditions to receive as preventive service Yes    Diabetes Screening Tests (at least every 3 years, Medicare covers annually or at 6-month intervals for prediabetic patients)    Fasting blood sugar (FBS) or glucose tolerance test (GTT) Patient must be diagnosed with one of the following:  -Hypertension, Dyslipidemia, obesity, previous impaired FBS or GTT  Or any two of the following: overweight, FH of diabetes, age ? 72, history of gestational diabetes, birth of baby weighing more than 9 pounds UTD 1/12/17    Diabetes Self-Management Training (DSMT) (no USPSTF recommendation) Requires referral by treating physician for patient with diabetes or renal disease. 10 hours of initial DSMT session of no less than 30 minutes each in a continuous 12-month period. 2 hours of follow-up DSMT in subsequent years.      Glaucoma Screening (no USPSTF recommendation) Diabetes mellitus, family history, , age 48 or over,  American, age 72 or over DUE     Human Immunodeficiency Virus (HIV) Screening (annually for increased risk patients)  HIV-1 and HIV-2 by EIA, JACKIE, rapid antibody test, or oral mucosa transudate Patient must be at increased risk for HIV infection per USPSTF guidelines or pregnant. Tests covered annually for patients at increased risk. Pregnant patients may receive up to 3 test during pregnancy. n/a    Medical Nutrition Therapy (MNT) (for diabetes or renal disease not recommended schedule) Requires referral by treating physician for patient with diabetes or renal disease. Can be provided in same year as diabetes self-management training (DSMT), and CMS recommends medical nutrition therapy take place after DSMT. Up to 3 hours for initial year and 2 hours in subsequent years. n/a    Shingles Vaccination A shingles vaccine is also recommended once in a lifetime after age 61 n/a    Seasonal Influenza Vaccination (annually)  UTD 10/12/16    Pneumococcal Vaccination (once after 72)  Declined     Hepatitis B Vaccinations (if medium/high risk) Medium/high risk factors:  End-stage renal disease,  Hemophiliacs who received Factor VIII or IX concentrates, Clients of institutions for the mentally retarded, Persons who live in the same house as a HepB virus carrier, Homosexual men, Illicit injectable drug abusers. n/a    Screening Mammography (biennial age 54-69) Annually (age 36 or over) Ordered     Screening Pap Tests and Pelvic Examination (up to age 79 and after 79 if unknown history or abnormal study last 10 years) Every 25 months except high risk UTD 1/7/2015    Ultrasound Screening for Abdominal Aortic Aneurysm (AAA) (once) Patient must be referred through [facility] and not have had a screening for abdominal aortic aneurysm before under Medicare.   Limited to patients who meet one of the following criteria:  - Men who are 73-68 years old and have smoked more than 100 cigarettes in their lifetime.  -Anyone with a FH of AAA  -Anyone recommended for screening by USPSTF n/a           Noninsulin Medicines for Type 2 Diabetes: Care Instructions  Your Care Instructions  There are different types of noninsulin medicines for diabetes. Each works in a different way to help you control your blood sugar. Some types help your body make insulin to lower your blood sugar. Others lower how much insulin your body needs. Some can slow how quickly your body digests sugars. And some can remove extra glucose through your urine. Some of these medicines are:  · Alpha-glucosidase inhibitors. These keep starches from breaking down. This means that they lower the amount of glucose absorbed when you eat. They do not help your body make more insulin. So they will not cause low blood sugar unless they are used with other medicines for diabetes. They include acarbose and miglitol. · DPP-4 inhibitors. These help your body increase the level of insulin after eating. They also help your body make less of a hormone that raises blood sugar. They include linagliptin, saxagliptin, and sitagliptin. · Incretin hormones (GLP-1 receptor agonists). Your body makes a protein that can raise your insulin level, lower your blood sugar, and make you less hungry. You can inject hormone medicines that work the same way as this protein. They include exenatide and liraglutide. · Meglitinides. These help your body release insulin. They also help slow how your body digests sugars. In this way, they prevent your blood sugar from rising too fast after you eat. They include nateglinide and repaglinide. · Metformin. This lowers how much glucose your liver makes. And it helps you respond better to insulin. It also lowers the amount of stored sugar that your liver releases when you are not eating. · Sodium glucose co-transporter 2 inhibitors (SGLT2 inhibitors). These help to remove extra glucose through your urine. They may also help some people lose weight. They include canagliflozin, dapagliflozin, and empagliflozin. · Sulfonylureas. These help your body release more insulin.  Some work for many hours and can cause low blood sugar if you don't eat as you expected. They include glipizide and glyburide. · Thiazolidinediones. These reduce the amount of blood glucose. They also help you respond better to insulin. They include pioglitazone and rosiglitazone. You may need to take more than one medicine for diabetes. Two or more medicines may work better to lower your blood sugar level than just one medicine. Follow-up care is a key part of your treatment and safety. Be sure to make and go to all appointments, and call your doctor if you are having problems. It's also a good idea to know your test results and keep a list of the medicines you take. How can you care for yourself at home? · Eat a healthy diet. Get some exercise each day. This may help you to reduce how much medicine you need. · Do not take other prescription or over-the-counter medicines, vitamins, herbal products, or supplements without talking to your doctor first. Some medicines for type 2 diabetes can cause problems with other medicines or supplements. · Tell your doctor if you plan to get pregnant. Some of these drugs are not safe for pregnant women. · Be safe with medicines. Take your medicines exactly as prescribed. Meglitinides or sulfonylureas can cause your blood sugar to drop very low. Call your doctor if you think you are having a problem with your medicine. · Check your blood sugar levels often. You can use a glucose monitor. Keeping track can help you know how certain foods, activities, and medicines affect your blood sugar. And it can help you keep your blood sugar from getting so low that it's not safe. When should you call for help? Call 911 anytime you think you may need emergency care. For example, call if:  · You passed out (lost consciousness), or you suddenly become very sleepy or confused. (You may have very low blood sugar.)  · You have symptoms of high blood sugar, such as:  ¨ Blurred vision. ¨ Trouble staying awake or being woken up.   ¨ Fast, deep breathing. ¨ Breath that smells fruity. ¨ Belly pain, not feeling hungry, and vomiting. ¨ Feeling confused. Call your doctor now or seek immediate medical care if:  · You are sick and cannot control your blood sugar. · You have been vomiting or have had diarrhea for more than 6 hours. · Your blood sugar stays higher than the level your doctor has set for you. · You have symptoms of low blood sugar, such as:  ¨ Sweating. ¨ Feeling nervous, shaky, and weak. ¨ Extreme hunger and slight nausea. ¨ Dizziness and headache. ¨ Blurred vision. ¨ Confusion. Watch closely for changes in your health, and be sure to contact your doctor if:  · You have a hard time knowing when your blood sugar is low. · You have trouble keeping your blood sugar in the target range. · You often have problems controlling your blood sugar. · You have symptoms of long-term diabetes problems, such as:  ¨ New vision changes. ¨ New pain, numbness, or tingling in your hands or feet. ¨ Skin problems. Where can you learn more? Go to http://kurt-lam.info/. Enter H153 in the search box to learn more about \"Noninsulin Medicines for Type 2 Diabetes: Care Instructions. \"  Current as of: August 3, 2016  Content Version: 11.1  © 8244-1975 [employer]. Care instructions adapted under license by [facility] [employer] (which disclaims liability or warranty for this information). If you have questions about a medical condition or this instruction, always ask your healthcare professional. [place] [address] any warranty or liability for your use of this information.

## 2017-02-23 NOTE — MR AVS SNAPSHOT
Visit Information Date & Time Provider Department Dept. Phone Encounter #  
 2/23/2017 10:00 AM MD Sofia Bird [phone] [id number] Follow-up Instructions Return in about 3 months (around 5/23/2017), or if symptoms worsen or fail to improve, for DM2, HTN tobacco abuse. Your Appointments 3/6/2017 10:00 AM  
Office Visit with MD Sofia Robert ([address]) Appt Note: Annual [address]  
 148 [facility] [address] 200 [facility]  
[phone]  
  
   
 [address]  
  
    
 5/23/2017  1:00 PM  
ROUTINE CARE with MD Sofia Bird ([address] [place] [address] Road) Appt Note: Return in about 3 months (around 5/23/2017), or if symptoms worsen or fail to improve, for DM2, HTN tobacco abuse. [address] [address] [place] [address]  
  
   
 [address] [facility] Upcoming Health Maintenance Date Due  
 MEDICARE YEARLY EXAM 9/20/1983 FOBT Q 1 YEAR AGE 50-75 9/20/2015 BREAST CANCER SCRN MAMMOGRAM 1/22/2016 EYE EXAM RETINAL OR DILATED Q1 12/17/2016 HEMOGLOBIN A1C Q6M 7/12/2017 FOOT EXAM Q1 10/12/2017 MICROALBUMIN Q1 10/12/2017 PAP AKA CERVICAL CYTOLOGY 1/7/2018 LIPID PANEL Q1 1/12/2018 DTaP/Tdap/Td series (2 - Td) 7/12/2026 Allergies as of 2/23/2017  Review Complete On: 2/23/2017 By: Manjula Robison MD  
 No Known Allergies Current Immunizations  Reviewed on 11/15/2016 Name Date Influenza Vaccine (Quad) PF 10/12/2016 Pneumococcal Polysaccharide (PPSV-23) 10/12/2016 Tdap 7/12/2016 Not reviewed this visit You Were Diagnosed With   
  
 Codes Comments Tobacco use disorder    -  Primary ICD-10-CM: W73.173 ICD-9-CM: 305.1 Routine general medical examination at a health care facility     ICD-10-CM: Z00.00 ICD-9-CM: V70.0 Screening for alcoholism     ICD-10-CM: Z13.89 ICD-9-CM: V79.1 Type 2 diabetes mellitus without complication, without long-term current use of insulin (HCC)     ICD-10-CM: E11.9 ICD-9-CM: 250.00 Essential hypertension with goal blood pressure less than 140/90     ICD-10-CM: I10 
ICD-9-CM: 401.9 Hyperlipidemia, unspecified hyperlipidemia type     ICD-10-CM: E78.5 ICD-9-CM: 272.4 History of CVA (cerebrovascular accident)     ICD-10-CM: Z86.73 
ICD-9-CM: V12.54 Vitals BP  
  
  
  
  
  
 146/81 (BP 1 Location: Left arm, BP Patient Position: Sitting) Vitals History BMI and BSA Data Body Mass Index Body Surface Area  
 27.26 kg/m 2 1.85 m 2 Preferred Pharmacy Pharmacy Name Phone [facility] [address] [place] [address] AT [facility] [phone] Your Updated Medication List  
  
   
This list is accurate as of: 2/23/17 10:52 AM.  Always use your most recent med list.  
  
  
  
  
 aspirin delayed-release 81 mg tablet Take 1 Tab by mouth daily. atorvastatin 80 mg tablet Commonly known as:  LIPITOR Take 1 Tab by mouth daily. losartan 100 mg tablet Commonly known as:  COZAAR Take 1 Tab by mouth daily. SITagliptin-metFORMIN 100-1,000 mg Tm24 Commonly known as:  JANUMET XR Take 1 Tab by mouth daily. Prescriptions Sent to Pharmacy Refills SITagliptin-metFORMIN (JANUMET XR) 100-1,000 mg TM24 3 Sig: Take 1 Tab by mouth daily. Class: Normal  
 Pharmacy: [facility] [address] [place] [address] [facility] Ph #: [phone] Route: Oral  
 losartan (COZAAR) 100 mg tablet 3 Sig: Take 1 Tab by mouth daily. Class: Normal  
 Pharmacy: [facility] [place] [address] Ph #: [phone]  Route: Oral  
 atorvastatin (LIPITOR) 80 mg tablet 3 Sig: Take 1 Tab by mouth daily. Class: Normal  
 Pharmacy: [facility] [address] [place] [address] [facility] Ph #: [phone] Route: Oral  
 aspirin delayed-release 81 mg tablet 3 Sig: Take 1 Tab by mouth daily. Class: Normal  
 Pharmacy: [facility] [address] [place] [address] [facility] Ph #: [phone] Route: Oral  
  
Follow-up Instructions Return in about 3 months (around 5/23/2017), or if symptoms worsen or fail to improve, for DM2, HTN tobacco abuse. Patient Instructions Medicare Part B Preventive Services Limitations Recommendation Scheduled Bone Mass Measurement 
(age 72 & older, biennial) Requires diagnosis related to osteoporosis or estrogen deficiency. Biennial benefit unless patient has history of long-term glucocorticoid tx or baseline is needed because initial test was by other method N/a Cardiovascular Screening Blood Tests (every 5 years) Total cholesterol, HDL, Triglycerides Order as a panel if possible UTD 1/12/17 Colorectal Cancer Screening 
-Fecal occult blood test (annual) -Flexible sigmoidoscopy (5y) 
-Screening colonoscopy (10y) -Barium Enema  UTD patient brought FOBT back today Counseling to Prevent Tobacco Use (up to 8 sessions per year) - Counseling greater than 3 and up to 10 minutes - Counseling greater than 10 minutes Patients must be asymptomatic of tobacco-related conditions to receive as preventive service Yes Diabetes Screening Tests (at least every 3 years, Medicare covers annually or at 6-month intervals for prediabetic patients) Fasting blood sugar (FBS) or glucose tolerance test (GTT) Patient must be diagnosed with one of the following: 
-Hypertension, Dyslipidemia, obesity, previous impaired FBS or GTT 
Or any two of the following: overweight, FH of diabetes, age ? 65, history of gestational diabetes, birth of baby weighing more than 9 pounds UTD 1/12/17 Diabetes Self-Management Training (DSMT) (no USPSTF recommendation) Requires referral by treating physician for patient with diabetes or renal disease. 10 hours of initial DSMT session of no less than 30 minutes each in a continuous 12-month period. 2 hours of follow-up DSMT in subsequent years. Glaucoma Screening (no USPSTF recommendation) Diabetes mellitus, family history, , age 48 or over,  American, age 72 or over DUE Human Immunodeficiency Virus (HIV) Screening (annually for increased risk patients) HIV-1 and HIV-2 by EIA, JACKIE, rapid antibody test, or oral mucosa transudate Patient must be at increased risk for HIV infection per USPSTF guidelines or pregnant. Tests covered annually for patients at increased risk. Pregnant patients may receive up to 3 test during pregnancy. n/a Medical Nutrition Therapy (MNT) (for diabetes or renal disease not recommended schedule) Requires referral by treating physician for patient with diabetes or renal disease. Can be provided in same year as diabetes self-management training (DSMT), and CMS recommends medical nutrition therapy take place after DSMT. Up to 3 hours for initial year and 2 hours in subsequent years. n/a Shingles Vaccination A shingles vaccine is also recommended once in a lifetime after age 62 n/a Seasonal Influenza Vaccination (annually)  UTD 10/12/16 Pneumococcal Vaccination (once after 65)  Declined Hepatitis B Vaccinations (if medium/high risk) Medium/high risk factors:  End-stage renal disease, Hemophiliacs who received Factor VIII or IX concentrates, Clients of institutions for the mentally retarded, Persons who live in the same house as a HepB virus carrier, Homosexual men, Illicit injectable drug abusers. n/a Screening Mammography (biennial age 54-69) Annually (age 36 or over) Ordered Screening Pap Tests and Pelvic Examination (up to age 79 and after 79 if unknown history or abnormal study last 10 years) Every 24 months except high risk UTD 1/7/2015 Ultrasound Screening for Abdominal Aortic Aneurysm (AAA) (once) Patient must be referred through IPPE and not have had a screening for abdominal aortic aneurysm before under Medicare. Limited to patients who meet one of the following criteria: 
- Men who are 73-68 years old and have smoked more than 100 cigarettes in their lifetime. 
-Anyone with a FH of AAA 
-Anyone recommended for screening by USPSTF n/a Noninsulin Medicines for Type 2 Diabetes: Care Instructions Your Care Instructions There are different types of noninsulin medicines for diabetes. Each works in a different way to help you control your blood sugar. Some types help your body make insulin to lower your blood sugar. Others lower how much insulin your body needs. Some can slow how quickly your body digests sugars. And some can remove extra glucose through your urine. Some of these medicines are: · Alpha-glucosidase inhibitors. These keep starches from breaking down. This means that they lower the amount of glucose absorbed when you eat. They do not help your body make more insulin. So they will not cause low blood sugar unless they are used with other medicines for diabetes. They include acarbose and miglitol. · DPP-4 inhibitors. These help your body increase the level of insulin after eating. They also help your body make less of a hormone that raises blood sugar. They include linagliptin, saxagliptin, and sitagliptin. · Incretin hormones (GLP-1 receptor agonists). Your body makes a protein that can raise your insulin level, lower your blood sugar, and make you less hungry. You can inject hormone medicines that work the same way as this protein. They include exenatide and liraglutide. · Meglitinides. These help your body release insulin.  They also help slow how your body digests sugars. In this way, they prevent your blood sugar from rising too fast after you eat. They include nateglinide and repaglinide. · Metformin. This lowers how much glucose your liver makes. And it helps you respond better to insulin. It also lowers the amount of stored sugar that your liver releases when you are not eating. · Sodium glucose co-transporter 2 inhibitors (SGLT2 inhibitors). These help to remove extra glucose through your urine. They may also help some people lose weight. They include canagliflozin, dapagliflozin, and empagliflozin. · Sulfonylureas. These help your body release more insulin. Some work for many hours and can cause low blood sugar if you don't eat as you expected. They include glipizide and glyburide. · Thiazolidinediones. These reduce the amount of blood glucose. They also help you respond better to insulin. They include pioglitazone and rosiglitazone. You may need to take more than one medicine for diabetes. Two or more medicines may work better to lower your blood sugar level than just one medicine. Follow-up care is a key part of your treatment and safety. Be sure to make and go to all appointments, and call your doctor if you are having problems. It's also a good idea to know your test results and keep a list of the medicines you take. How can you care for yourself at home? · Eat a healthy diet. Get some exercise each day. This may help you to reduce how much medicine you need. · Do not take other prescription or over-the-counter medicines, vitamins, herbal products, or supplements without talking to your doctor first. Some medicines for type 2 diabetes can cause problems with other medicines or supplements. · Tell your doctor if you plan to get pregnant. Some of these drugs are not safe for pregnant women. · Be safe with medicines. Take your medicines exactly as prescribed. Meglitinides or sulfonylureas can cause your blood sugar to drop very low. Call your doctor if you think you are having a problem with your medicine. · Check your blood sugar levels often. You can use a glucose monitor. Keeping track can help you know how certain foods, activities, and medicines affect your blood sugar. And it can help you keep your blood sugar from getting so low that it's not safe. When should you call for help? Call 911 anytime you think you may need emergency care. For example, call if: 
· You passed out (lost consciousness), or you suddenly become very sleepy or confused. (You may have very low blood sugar.) · You have symptoms of high blood sugar, such as: ¨ Blurred vision. ¨ Trouble staying awake or being woken up. ¨ Fast, deep breathing. ¨ Breath that smells fruity. ¨ Belly pain, not feeling hungry, and vomiting. ¨ Feeling confused. Call your doctor now or seek immediate medical care if: 
· You are sick and cannot control your blood sugar. · You have been vomiting or have had diarrhea for more than 6 hours. · Your blood sugar stays higher than the level your doctor has set for you. · You have symptoms of low blood sugar, such as: ¨ Sweating. ¨ Feeling nervous, shaky, and weak. ¨ Extreme hunger and slight nausea. ¨ Dizziness and headache. ¨ Blurred vision. ¨ Confusion. Watch closely for changes in your health, and be sure to contact your doctor if: 
· You have a hard time knowing when your blood sugar is low. · You have trouble keeping your blood sugar in the target range. · You often have problems controlling your blood sugar. · You have symptoms of long-term diabetes problems, such as: ¨ New vision changes. ¨ New pain, numbness, or tingling in your hands or feet. ¨ Skin problems. Where can you learn more? Go to http://kurt-lam.info/. Enter H153 in the search box to learn more about \"Noninsulin Medicines for Type 2 Diabetes: Care Instructions. \" Current as of: August 3, 2016 Content Version: 11.1 © 9259-8445 [employer]. Care instructions adapted under license by [employer] (which disclaims liability or warranty for this information). If you have questions about a medical condition or this instruction, always ask your healthcare professional. [address] any warranty or liability for your use of this information. Introducing [facility]! [employer] introduces The[employer]helf patient portal. Now you can access parts of your medical record, email your doctor's office, and request medication refills online. 1. In your internet browser, go to [email or web address] 2. Click on the First Time User? Click Here link in the Sign In box. You will see the New Member Sign Up page. 3. Enter your The[employer]helf Access Code exactly as it appears below. You will not need to use this code after youve completed the sign-up process. If you do not sign up before the expiration date, you must request a new code. · The[employer]helf Access Code: [email or web address] Expires: 4/12/2017  9:20 AM 
 
4. Enter the last four digits of your Social Security Number (xxxx) and Date of Birth (mm/dd/yyyy) as indicated and click Submit. You will be taken to the next sign-up page. 5. Create a TheShelf ID. This will be your TheShelf login ID and cannot be changed, so think of one that is secure and easy to remember. 6. Create a [employer]helf password. You can change your password at any time. 7. Enter your Password Reset Question and Answer. This can be used at a later time if you forget your password. 8. Enter your e-mail address. You will receive e-mail notification when new information is available in [address] 9. Click Sign Up. You can now view and download portions of your medical record. 10. Click the Download Summary menu link to download a portable copy of your medical information.  
 
If you have questions, please visit the Frequently Asked Questions section of the [employer]. Remember, [employer] is NOT to be used for urgent needs. For medical emergencies, dial 911. Now available from your iPhone and Android! Please provide this summary of care documentation to your next provider. Your primary care clinician is listed as Carrillo Alex. If you have any questions after today's visit, please call [phone].

## 2017-02-24 LAB — HEMOCCULT STL QL IA: NEGATIVE

## 2017-05-23 ENCOUNTER — OFFICE VISIT (OUTPATIENT)
Dept: FAMILY MEDICINE CLINIC | Age: 52
End: 2017-05-23

## 2017-05-23 VITALS
DIASTOLIC BLOOD PRESSURE: 89 MMHG | SYSTOLIC BLOOD PRESSURE: 149 MMHG | HEART RATE: 91 BPM | HEIGHT: 66 IN | BODY MASS INDEX: 25.81 KG/M2 | WEIGHT: 160.6 LBS | RESPIRATION RATE: 14 BRPM | TEMPERATURE: 97.6 F | OXYGEN SATURATION: 100 %

## 2017-05-23 DIAGNOSIS — Z86.73 HISTORY OF CVA (CEREBROVASCULAR ACCIDENT): ICD-10-CM

## 2017-05-23 DIAGNOSIS — E11.9 TYPE 2 DIABETES MELLITUS WITHOUT COMPLICATION, WITHOUT LONG-TERM CURRENT USE OF INSULIN (HCC): ICD-10-CM

## 2017-05-23 DIAGNOSIS — E78.5 HYPERLIPIDEMIA, UNSPECIFIED HYPERLIPIDEMIA TYPE: ICD-10-CM

## 2017-05-23 DIAGNOSIS — I10 ESSENTIAL HYPERTENSION WITH GOAL BLOOD PRESSURE LESS THAN 140/90: ICD-10-CM

## 2017-05-23 RX ORDER — LOSARTAN POTASSIUM 100 MG/1
100 TABLET ORAL DAILY
Qty: 30 TAB | Refills: 3 | Status: SHIPPED | OUTPATIENT
Start: 2017-05-23 | End: 2017-12-21 | Stop reason: SDUPTHER

## 2017-05-23 RX ORDER — ASPIRIN 81 MG/1
81 TABLET ORAL DAILY
Qty: 30 TAB | Refills: 3 | Status: SHIPPED | OUTPATIENT
Start: 2017-05-23 | End: 2017-12-21 | Stop reason: SDUPTHER

## 2017-05-23 RX ORDER — ATORVASTATIN CALCIUM 80 MG/1
80 TABLET, FILM COATED ORAL DAILY
Qty: 30 TAB | Refills: 3 | Status: SHIPPED | OUTPATIENT
Start: 2017-05-23 | End: 2017-12-21 | Stop reason: SDUPTHER

## 2017-05-23 NOTE — MR AVS SNAPSHOT
Visit Information Date & Time Provider Department Dept. Phone Encounter #  
 5/23/2017  1:00 PM Carrillo Levin MD [facility] [phone] [id number] Follow-up Instructions Return in about 2 months (around 7/23/2017), or if symptoms worsen or fail to improve, for dm2, htn, dyslipidemia. Upcoming Health Maintenance Date Due  
 BREAST CANCER SCRN MAMMOGRAM 1/22/2016 HEMOGLOBIN A1C Q6M 7/12/2017 INFLUENZA AGE 9 TO ADULT 8/1/2017 FOOT EXAM Q1 10/12/2017 MICROALBUMIN Q1 10/12/2017 PAP AKA CERVICAL CYTOLOGY 1/7/2018 LIPID PANEL Q1 1/12/2018 FOBT Q 1 YEAR AGE 50-75 2/23/2018 MEDICARE YEARLY EXAM 2/24/2018 EYE EXAM RETINAL OR DILATED Q1 4/6/2018 DTaP/Tdap/Td series (2 - Td) 7/12/2026 Allergies as of 5/23/2017  Review Complete On: 5/23/2017 By: Ban Shoemaker MD  
 No Known Allergies Current Immunizations  Reviewed on 11/15/2016 Name Date Influenza Vaccine (Quad) PF 10/12/2016 Pneumococcal Polysaccharide (PPSV-23) 10/12/2016 Tdap 7/12/2016 Not reviewed this visit You Were Diagnosed With   
  
 Codes Comments Type 2 diabetes mellitus without complication, without long-term current use of insulin (HCC)     ICD-10-CM: E11.9 ICD-9-CM: 250.00 Essential hypertension with goal blood pressure less than 140/90     ICD-10-CM: I10 
ICD-9-CM: 401.9 Hyperlipidemia, unspecified hyperlipidemia type     ICD-10-CM: E78.5 ICD-9-CM: 272.4 History of CVA (cerebrovascular accident)     ICD-10-CM: Z86.73 
ICD-9-CM: V12.54 Vitals BP Pulse Temp Resp Height(growth percentile) Weight(growth percentile) 149/89 (BP 1 Location: Left arm, BP Patient Position: Sitting) 91 97.6 °F (36.4 °C) (Oral) 14 5' 6\" (1.676 m) 160 lb 9.6 oz (72.8 kg) LMP SpO2 BMI OB Status Smoking Status 05/19/2017 (Exact Date) 100% 25.92 kg/m2 Having regular periods Current Every Day Smoker Vitals History BMI and BSA Data Body Mass Index Body Surface Area  
 25.92 kg/m 2 1.84 m 2 Preferred Pharmacy Pharmacy Name Phone [facility] [address] [place] [address] [facility] AT [facility] [phone] Your Updated Medication List  
  
   
This list is accurate as of: 5/23/17  1:20 PM.  Always use your most recent med list.  
  
  
  
  
 aspirin delayed-release 81 mg tablet Take 1 Tab by mouth daily. atorvastatin 80 mg tablet Commonly known as:  LIPITOR Take 1 Tab by mouth daily. losartan 100 mg tablet Commonly known as:  COZAAR Take 1 Tab by mouth daily. SITagliptin-metFORMIN 100-1,000 mg Tm24 Commonly known as:  JANUMET XR Take 1 Tab by mouth daily. Prescriptions Sent to Pharmacy Refills SITagliptin-metFORMIN (JANUMET XR) 100-1,000 mg TM24 3 Sig: Take 1 Tab by mouth daily. Class: Normal  
 Pharmacy: [facility] [employer] [address] [place] [address] Ph #: [phone] Route: Oral  
 losartan (COZAAR) 100 mg tablet 3 Sig: Take 1 Tab by mouth daily. Class: Normal  
 Pharmacy: [facility] [address] [place] [address] Ph #: [phone] Route: Oral  
 atorvastatin (LIPITOR) 80 mg tablet 3 Sig: Take 1 Tab by mouth daily. Class: Normal  
 Pharmacy: [facility] [employer] [address] [place] [address] [facility] Ph #: [phone] Route: Oral  
 aspirin delayed-release 81 mg tablet 3 Sig: Take 1 Tab by mouth daily. Class: Normal  
 [facility] [employer] [address] [place] [address] Ph #: [phone] Route: Oral  
  
Follow-up Instructions Return in about 2 months (around 7/23/2017), or if symptoms worsen or fail to improve, for dm2, htn, dyslipidemia. Patient Instructions Noninsulin Medicines for Type 2 Diabetes: Care Instructions Your Care Instructions There are different types of noninsulin medicines for diabetes. Each works in a different way to help you control your blood sugar. Some types help your body make insulin to lower your blood sugar. Others lower how much insulin your body needs. Some can slow how quickly your body digests sugars. And some can remove extra glucose through your urine. Some of these medicines are: · Alpha-glucosidase inhibitors. These keep starches from breaking down. This means that they lower the amount of glucose absorbed when you eat. They do not help your body make more insulin. So they will not cause low blood sugar unless they are used with other medicines for diabetes. They include acarbose and miglitol. · DPP-4 inhibitors. These help your body increase the level of insulin after eating. They also help your body make less of a hormone that raises blood sugar. They include linagliptin, saxagliptin, and sitagliptin. · Incretin hormones (GLP-1 receptor agonists). Your body makes a protein that can raise your insulin level, lower your blood sugar, and make you less hungry. You can inject hormone medicines that work the same way as this protein. They include exenatide and liraglutide. · Meglitinides. These help your body release insulin. They also help slow how your body digests sugars. In this way, they prevent your blood sugar from rising too fast after you eat. They include nateglinide and repaglinide. · Metformin. This lowers how much glucose your liver makes. And it helps you respond better to insulin. It also lowers the amount of stored sugar that your liver releases when you are not eating. · Sodium glucose co-transporter 2 inhibitors (SGLT2 inhibitors). These help to remove extra glucose through your urine. They may also help some people lose weight. They include canagliflozin, dapagliflozin, and empagliflozin. · Sulfonylureas. These help your body release more insulin. Some work for many hours and can cause low blood sugar if you don't eat as you expected. They include glipizide and glyburide. · Thiazolidinediones. These reduce the amount of blood glucose. They also help you respond better to insulin. They include pioglitazone and rosiglitazone. You may need to take more than one medicine for diabetes. Two or more medicines may work better to lower your blood sugar level than just one medicine. Follow-up care is a key part of your treatment and safety. Be sure to make and go to all appointments, and call your doctor if you are having problems. It's also a good idea to know your test results and keep a list of the medicines you take. How can you care for yourself at home? · Eat a healthy diet. Get some exercise each day. This may help you to reduce how much medicine you need. · Do not take other prescription or over-the-counter medicines, vitamins, herbal products, or supplements without talking to your doctor first. Some medicines for type 2 diabetes can cause problems with other medicines or supplements. · Tell your doctor if you plan to get pregnant. Some of these drugs are not safe for pregnant women. · Be safe with medicines. Take your medicines exactly as prescribed. Meglitinides or sulfonylureas can cause your blood sugar to drop very low. Call your doctor if you think you are having a problem with your medicine. · Check your blood sugar levels often. You can use a glucose monitor. Keeping track can help you know how certain foods, activities, and medicines affect your blood sugar. And it can help you keep your blood sugar from getting so low that it's not safe. When should you call for help? Call 911 anytime you think you may need emergency care. For example, call if: 
· You passed out (lost consciousness), or you suddenly become very sleepy or confused. (You may have very low blood sugar.) · You have symptoms of high blood sugar, such as: ¨ Blurred vision. ¨ Trouble staying awake or being woken up. ¨ Fast, deep breathing. ¨ Breath that smells fruity. ¨ Belly pain, not feeling hungry, and vomiting. ¨ Feeling confused. Call your doctor now or seek immediate medical care if: 
· You are sick and cannot control your blood sugar. · You have been vomiting or have had diarrhea for more than 6 hours. · Your blood sugar stays higher than the level your doctor has set for you. · You have symptoms of low blood sugar, such as: ¨ Sweating. ¨ Feeling nervous, shaky, and weak. ¨ Extreme hunger and slight nausea. ¨ Dizziness and headache. ¨ Blurred vision. ¨ Confusion. Watch closely for changes in your health, and be sure to contact your doctor if: 
· You have a hard time knowing when your blood sugar is low. · You have trouble keeping your blood sugar in the target range. · You often have problems controlling your blood sugar. · You have symptoms of long-term diabetes problems, such as: ¨ New vision changes. ¨ New pain, numbness, or tingling in your hands or feet. ¨ Skin problems. Where can you learn more? Go to http://kurtuShiplam.info/. Enter H153 in the search box to learn more about \"Noninsulin Medicines for Type 2 Diabetes: Care Instructions. \" Current as of: August 3, 2016 Content Version: 11.2 © 3539-0245 [employer]. Care instructions adapted under license by [employer] (which disclaims liability or warranty for this information). If you have questions about a medical condition or this instruction, always ask your healthcare professional. [address] any warranty or liability for your use of this information. Introducing [facility]! [place] [employer] introduces SingleHop patient portal. Now you can access parts of your medical record, email your doctor's office, and request medication refills online. 1. In your internet browser, go to [email or web address] 2. Click on the First Time User? Click Here link in the Sign In box. You will see the New Member Sign Up page. 3. Enter your Proofpoint Access Code exactly as it appears below. You will not need to use this code after youve completed the sign-up process. If you do not sign up before the expiration date, you must request a new code. · Proofpoint Access Code: [email or web address] Expires: 8/14/2017 10:44 AM 
 
4. Enter the last four digits of your Social Security Number (xxxx) and Date of Birth (mm/dd/yyyy) as indicated and click Submit. You will be taken to the next sign-up page. 5. Create a Wash[employer] ID. This will be your Proofpoint login ID and cannot be changed, so think of one that is secure and easy to remember. 6. Create a Proofpoint password. You can change your password at any time. 7. Enter your Password Reset Question and Answer. This can be used at a later time if you forget your password. 8. Enter your e-mail address. You will receive e-mail notification when new information is available in [address] 9. Click Sign Up. You can now view and download portions of your medical record. 10. Click the Download Summary menu link to download a portable copy of your medical information. If you have questions, please visit the Frequently Asked Questions section of the Proofpoint website. Remember, Proofpoint is NOT to be used for urgent needs. For medical emergencies, dial 911. Now available from your iPhone and Android! Please provide this summary of care documentation to your next provider. Your primary care clinician is listed as Carrillo Alex. If you have any questions after today's visit, please call [phone].

## 2017-05-23 NOTE — PROGRESS NOTES
Chief Complaint   Patient presents with    Follow-up     DM2, HTN, tobacco     1. Have you been to the ER, urgent care clinic since your last visit? Hospitalized since your last visit? No    2. Have you seen or consulted any other health care providers outside of the [address] [place] [address] since your last visit? Include any pap smears or colon screening. No     HPI  Poli Wong comes in for f/u care. 1) DM2: she does check her blood glucose. Says numbers are in the 130's. Due for hba1c check in July. 2) HTN: on medication. Says at home BP is stable. Today slightly elevated. Will monitor, keep log and f/u at next visit. 3) Tobacco abuse: continues to smoke. Not ready to quit. Discussed dangers of smoking and benefits of quitting. 4) Dyslipidemia: on lipitor, stable. Recheck labs at next visit. Past Medical History  Past Medical History:   Diagnosis Date    Diabetes ([facility] [address]     Hyperlipidemia     Hypertension     Stroke [facility])     2012       Surgical History  No past surgical history on file. Medications  Current Outpatient Prescriptions   Medication Sig Dispense Refill    SITagliptin-metFORMIN (JANUMET XR) 100-1,000 mg TM24 Take 1 Tab by mouth daily. 30 Tab 3    losartan (COZAAR) 100 mg tablet Take 1 Tab by mouth daily. 30 Tab 3    atorvastatin (LIPITOR) 80 mg tablet Take 1 Tab by mouth daily. 30 Tab 3    aspirin delayed-release 81 mg tablet Take 1 Tab by mouth daily. 30 Tab 3       Allergies  No Known Allergies    Family History  No family history on file. Social History  Social History     Social History    Marital status: SINGLE     Spouse name: N/A    Number of children: N/A    Years of education: N/A     Occupational History    Not on file.      Social History Main Topics    Smoking status: Current Every Day Smoker     Packs/day: 0.50     Types: Cigarettes    Smokeless tobacco: Never Used    Alcohol use 0.0 oz/week     0 Standard drinks or equivalent per week      Comment: occassionally     Drug use: No    Sexual activity: No     Other Topics Concern    Not on file     Social History Narrative       Review of Systems  Review of Systems - History obtained from chart review and the patient  General ROS: negative  Psychological ROS: negative  Ophthalmic ROS: negative  Endocrine ROS: DM2  Respiratory ROS: no cough, shortness of breath, or wheezing  Cardiovascular ROS: no chest pain or dyspnea on exertion  Gastrointestinal ROS: no abdominal pain, change in bowel habits, or black or bloody stools  Genito-Urinary ROS: no dysuria, trouble voiding, or hematuria  Musculoskeletal ROS: negative  Neurological ROS: no TIA or stroke symptoms  Dermatological ROS: negative    Vital Signs  Visit Vitals    /89 (BP 1 Location: Left arm, BP Patient Position: Sitting)    Pulse 91    Temp 97.6 °F (36.4 °C) (Oral)    Resp 14    Ht 5' 6\" (1.676 m)    Wt 160 lb 9.6 oz (72.8 kg)    LMP 05/19/2017 (Exact Date)    SpO2 100%    BMI 25.92 kg/m2         Physical Exam  Physical Examination: General appearance - oriented to person, place, and time, acyanotic, in no respiratory distress and well hydrated  Mental status - alert, oriented to person, place, and time, affect appropriate to mood  Mouth - mucous membranes moist, pharynx normal without lesions  Neck - supple, no significant adenopathy  Chest - clear to auscultation, no wheezes, rales or rhonchi, symmetric air entry  Heart - normal rate, regular rhythm, normal S1, S2, no murmurs, rubs, clicks or gallops  Musculoskeletal - no joint tenderness, deformity or swelling  Extremities - no pedal edema noted, intact peripheral pulses    Diagnostics  Orders Placed This Encounter    SITagliptin-metFORMIN (JANUMET XR) 100-1,000 mg TM24     Sig: Take 1 Tab by mouth daily. Dispense:  30 Tab     Refill:  3    losartan (COZAAR) 100 mg tablet     Sig: Take 1 Tab by mouth daily.      Dispense:  30 Tab     Refill:  3    atorvastatin (LIPITOR) 80 mg tablet Sig: Take 1 Tab by mouth daily. Dispense:  30 Tab     Refill:  3    aspirin delayed-release 81 mg tablet     Sig: Take 1 Tab by mouth daily. Dispense:  30 Tab     Refill:  3         Results  Results for orders placed or performed in visit on 02/23/17   OCCULT BLOOD, IMMUNOASSAY   Result Value Ref Range    Occult Blood fecal, by IA Negative Negative     ASSESSMENT and PLAN    ICD-10-CM ICD-9-CM    1. Type 2 diabetes mellitus without complication, without long-term current use of insulin (HCC) E11.9 250.00 SITagliptin-metFORMIN (JANUMET XR) 100-1,000 mg TM24      aspirin delayed-release 81 mg tablet   2. Essential hypertension with goal blood pressure less than 140/90 I10 401.9 losartan (COZAAR) 100 mg tablet   3. Hyperlipidemia, unspecified hyperlipidemia type E78.5 272.4 atorvastatin (LIPITOR) 80 mg tablet   4. History of CVA (cerebrovascular accident) Z86.73 V12.54 aspirin delayed-release 81 mg tablet     current treatment plan is effective, no change in therapy  lab results and schedule of future lab studies reviewed with patient  reviewed diet, exercise and weight control  very strongly urged to quit smoking to reduce cardiovascular risk  cardiovascular risk and specific lipid/LDL goals reviewed  reviewed medications and side effects in detail  specific diabetic recommendations: low cholesterol diet, weight control and daily exercise discussed, home glucose monitoring emphasized, all medications, side effects and compliance discussed carefully, glycohemoglobin and other lab monitoring discussed, long term diabetic complications discussed and patient urged in the strongest terms to quit smoking  use of aspirin to prevent MI and TIA's discussed      I have discussed the diagnosis with the patient and the intended plan of care as seen in the above orders. The patient has received an after-visit summary and questions were answered concerning future plans.  I have discussed medication, side effects, and warnings with the patient in detail. The patient verbalized understanding and is in agreement with the plan of care. The patient will contact the office with any additional concerns.     Anna Linares MD

## 2017-05-23 NOTE — PATIENT INSTRUCTIONS
Noninsulin Medicines for Type 2 Diabetes: Care Instructions  Your Care Instructions  There are different types of noninsulin medicines for diabetes. Each works in a different way to help you control your blood sugar. Some types help your body make insulin to lower your blood sugar. Others lower how much insulin your body needs. Some can slow how quickly your body digests sugars. And some can remove extra glucose through your urine. Some of these medicines are:  · Alpha-glucosidase inhibitors. These keep starches from breaking down. This means that they lower the amount of glucose absorbed when you eat. They do not help your body make more insulin. So they will not cause low blood sugar unless they are used with other medicines for diabetes. They include acarbose and miglitol. · DPP-4 inhibitors. These help your body increase the level of insulin after eating. They also help your body make less of a hormone that raises blood sugar. They include linagliptin, saxagliptin, and sitagliptin. · Incretin hormones (GLP-1 receptor agonists). Your body makes a protein that can raise your insulin level, lower your blood sugar, and make you less hungry. You can inject hormone medicines that work the same way as this protein. They include exenatide and liraglutide. · Meglitinides. These help your body release insulin. They also help slow how your body digests sugars. In this way, they prevent your blood sugar from rising too fast after you eat. They include nateglinide and repaglinide. · Metformin. This lowers how much glucose your liver makes. And it helps you respond better to insulin. It also lowers the amount of stored sugar that your liver releases when you are not eating. · Sodium glucose co-transporter 2 inhibitors (SGLT2 inhibitors). These help to remove extra glucose through your urine. They may also help some people lose weight. They include canagliflozin, dapagliflozin, and empagliflozin. · Sulfonylureas. These help your body release more insulin. Some work for many hours and can cause low blood sugar if you don't eat as you expected. They include glipizide and glyburide. · Thiazolidinediones. These reduce the amount of blood glucose. They also help you respond better to insulin. They include pioglitazone and rosiglitazone. You may need to take more than one medicine for diabetes. Two or more medicines may work better to lower your blood sugar level than just one medicine. Follow-up care is a key part of your treatment and safety. Be sure to make and go to all appointments, and call your doctor if you are having problems. It's also a good idea to know your test results and keep a list of the medicines you take. How can you care for yourself at home? · Eat a healthy diet. Get some exercise each day. This may help you to reduce how much medicine you need. · Do not take other prescription or over-the-counter medicines, vitamins, herbal products, or supplements without talking to your doctor first. Some medicines for type 2 diabetes can cause problems with other medicines or supplements. · Tell your doctor if you plan to get pregnant. Some of these drugs are not safe for pregnant women. · Be safe with medicines. Take your medicines exactly as prescribed. Meglitinides or sulfonylureas can cause your blood sugar to drop very low. Call your doctor if you think you are having a problem with your medicine. · Check your blood sugar levels often. You can use a glucose monitor. Keeping track can help you know how certain foods, activities, and medicines affect your blood sugar. And it can help you keep your blood sugar from getting so low that it's not safe. When should you call for help? Call 911 anytime you think you may need emergency care. For example, call if:  · You passed out (lost consciousness), or you suddenly become very sleepy or confused.  (You may have very low blood sugar.)  · You have symptoms of high blood sugar, such as:  ¨ Blurred vision. ¨ Trouble staying awake or being woken up. ¨ Fast, deep breathing. ¨ Breath that smells fruity. ¨ Belly pain, not feeling hungry, and vomiting. ¨ Feeling confused. Call your doctor now or seek immediate medical care if:  · You are sick and cannot control your blood sugar. · You have been vomiting or have had diarrhea for more than 6 hours. · Your blood sugar stays higher than the level your doctor has set for you. · You have symptoms of low blood sugar, such as:  ¨ Sweating. ¨ Feeling nervous, shaky, and weak. ¨ Extreme hunger and slight nausea. ¨ Dizziness and headache. ¨ Blurred vision. ¨ Confusion. Watch closely for changes in your health, and be sure to contact your doctor if:  · You have a hard time knowing when your blood sugar is low. · You have trouble keeping your blood sugar in the target range. · You often have problems controlling your blood sugar. · You have symptoms of long-term diabetes problems, such as:  ¨ New vision changes. ¨ New pain, numbness, or tingling in your hands or feet. ¨ Skin problems. Where can you learn more? Go to http://kurt-lam.info/. Enter H153 in the search box to learn more about \"Noninsulin Medicines for Type 2 Diabetes: Care Instructions. \"  Current as of: August 3, 2016  Content Version: 11.2  © 4188-2125 [employer]. Care instructions adapted under license by [employer] (which disclaims liability or warranty for this information). If you have questions about a medical condition or this instruction, always ask your healthcare professional. [place] [address] any warranty or liability for your use of this information.

## 2017-07-17 DIAGNOSIS — E11.9 TYPE 2 DIABETES MELLITUS WITHOUT COMPLICATION (HCC): ICD-10-CM

## 2017-07-17 DIAGNOSIS — Z86.73 HISTORY OF CVA (CEREBROVASCULAR ACCIDENT): ICD-10-CM

## 2017-07-17 DIAGNOSIS — E78.5 HYPERLIPIDEMIA, UNSPECIFIED HYPERLIPIDEMIA TYPE: ICD-10-CM

## 2017-07-17 DIAGNOSIS — I10 ESSENTIAL HYPERTENSION WITH GOAL BLOOD PRESSURE LESS THAN 140/90: ICD-10-CM

## 2017-07-18 RX ORDER — ASPIRIN 81 MG/1
TABLET ORAL
Qty: 30 TAB | Refills: 0 | Status: SHIPPED | OUTPATIENT
Start: 2017-07-18 | End: 2017-12-21 | Stop reason: SDUPTHER

## 2017-07-18 RX ORDER — ATORVASTATIN CALCIUM 80 MG/1
TABLET, FILM COATED ORAL
Qty: 30 TAB | Refills: 0 | Status: SHIPPED | OUTPATIENT
Start: 2017-07-18 | End: 2017-12-21 | Stop reason: SDUPTHER

## 2017-07-18 RX ORDER — LOSARTAN POTASSIUM 100 MG/1
TABLET ORAL
Qty: 30 TAB | Refills: 0 | Status: SHIPPED | OUTPATIENT
Start: 2017-07-18 | End: 2017-12-21 | Stop reason: SDUPTHER

## 2017-12-21 ENCOUNTER — OFFICE VISIT (OUTPATIENT)
Dept: FAMILY MEDICINE CLINIC | Age: 52
End: 2017-12-21

## 2017-12-21 VITALS
OXYGEN SATURATION: 98 % | DIASTOLIC BLOOD PRESSURE: 78 MMHG | BODY MASS INDEX: 26.2 KG/M2 | HEIGHT: 66 IN | WEIGHT: 163 LBS | RESPIRATION RATE: 16 BRPM | SYSTOLIC BLOOD PRESSURE: 153 MMHG | TEMPERATURE: 98.2 F | HEART RATE: 92 BPM

## 2017-12-21 DIAGNOSIS — F17.200 TOBACCO USE DISORDER: ICD-10-CM

## 2017-12-21 DIAGNOSIS — E11.9 TYPE 2 DIABETES MELLITUS WITHOUT COMPLICATION, WITHOUT LONG-TERM CURRENT USE OF INSULIN (HCC): Primary | ICD-10-CM

## 2017-12-21 DIAGNOSIS — M54.50 MIDLINE LOW BACK PAIN WITHOUT SCIATICA, UNSPECIFIED CHRONICITY: ICD-10-CM

## 2017-12-21 DIAGNOSIS — E11.9 COMPREHENSIVE DIABETIC FOOT EXAMINATION, TYPE 2 DM, ENCOUNTER FOR (HCC): ICD-10-CM

## 2017-12-21 DIAGNOSIS — Z23 ENCOUNTER FOR IMMUNIZATION: ICD-10-CM

## 2017-12-21 DIAGNOSIS — I10 ESSENTIAL HYPERTENSION: ICD-10-CM

## 2017-12-21 DIAGNOSIS — E78.49 OTHER HYPERLIPIDEMIA: ICD-10-CM

## 2017-12-21 LAB
BILIRUB UR QL STRIP: NEGATIVE
GLUCOSE UR-MCNC: NEGATIVE MG/DL
KETONES P FAST UR STRIP-MCNC: NEGATIVE MG/DL
PH UR STRIP: 6 [PH] (ref 4.6–8)
PROT UR QL STRIP: NORMAL
SP GR UR STRIP: 1.02 (ref 1–1.03)
UA UROBILINOGEN AMB POC: NORMAL (ref 0.2–1)
URINALYSIS CLARITY POC: CLEAR
URINALYSIS COLOR POC: YELLOW
URINE BLOOD POC: NEGATIVE
URINE LEUKOCYTES POC: NEGATIVE
URINE NITRITES POC: NEGATIVE

## 2017-12-21 RX ORDER — ASPIRIN 81 MG/1
81 TABLET ORAL DAILY
Qty: 90 TAB | Refills: 1 | Status: SHIPPED | OUTPATIENT
Start: 2017-12-21 | End: 2018-04-19 | Stop reason: SDUPTHER

## 2017-12-21 RX ORDER — NAPROXEN 500 MG/1
500 TABLET ORAL
Qty: 60 TAB | Refills: 2 | Status: SHIPPED | OUTPATIENT
Start: 2017-12-21 | End: 2018-04-19 | Stop reason: SDUPTHER

## 2017-12-21 RX ORDER — LOSARTAN POTASSIUM 100 MG/1
100 TABLET ORAL DAILY
Qty: 90 TAB | Refills: 1 | Status: SHIPPED | OUTPATIENT
Start: 2017-12-21 | End: 2018-04-19 | Stop reason: SDUPTHER

## 2017-12-21 RX ORDER — ATORVASTATIN CALCIUM 80 MG/1
80 TABLET, FILM COATED ORAL DAILY
Qty: 90 TAB | Refills: 1 | Status: SHIPPED | OUTPATIENT
Start: 2017-12-21 | End: 2018-04-19 | Stop reason: SDUPTHER

## 2017-12-21 NOTE — PROGRESS NOTES
Abimbola Spivey is a 46 y.o. female who presents for routine immunizations. She denies any symptoms , reactions or allergies that would exclude them from being immunized today. Risks and adverse reactions were discussed and the VIS was given to them. All questions were addressed. She was observed for 15 min post injection. There were no reactions observed.     Georgean Gottron, LPN

## 2017-12-21 NOTE — PROGRESS NOTES
Chief Complaint   Patient presents with    Hypertension    Diabetes    LOW BACK PAIN     Patient in for diabetes and HTN follow-up. Patient has been out of her medications for 3 weeks now. Patient also c/o low back pain for couple months now. 1. Have you been to the ER, urgent care clinic since your last visit? Hospitalized since your last visit? No    2. Have you seen or consulted any other health care providers outside of the [address] [place] [address] since your last visit? Include any pap smears or colon screening. No     HPI  Jayda Erickson comes in for follow-up care. Diabetes mellitus type 2: Patient has been off medications for about 3 weeks. This is due to having run out of insurance but this is back now. She would like a refill of her medications. Has been checking her blood glucose and it has been around 150-160. I will renew her medications. I will check labs today. We will do CBC, CMP, lipid panel, microalbumin and HbA1c. I did do a foot exam that is stable. Hypertension: Patient has a history of hypertension. Blood pressure is stable. She does need a refill of her Cozaar. Prescription was sent in. Dyslipidemia: Patient takes atorvastatin. Will refill her medication. Will check her lipid panel today. Low back pain: Patient has long-standing low back pain. Pain comes with motion and activity. Denies bladder or bowel dysfunction. Denies sciatica-like symptoms. Denies foot drop. This is myofascial pain. She has tried over-the-counter medication and this helps transiently. I will send in prescription for naproxen to take twice daily as needed for the pain. Will follow-up in case of no improvement worsening symptoms. Tobacco abuse: Patient continues to smoke. She states she has cut down on the smoking. Does not want any nicotine replacement on medication to quit smoking. States she will do this by herself.   Encouraged her to do so and strongly advised against smoking. Past Medical History  Past Medical History:   Diagnosis Date    Diabetes ([facility] [address]     Hyperlipidemia     Hypertension     [facility]roke [facility])     2012       Surgical History  No past surgical history on file. Medications  Current Outpatient Prescriptions   Medication Sig Dispense Refill    SITagliptin-metFORMIN (JANUMET XR) 100-1,000 mg TM24 Take 1 Tab by mouth daily. 30 Tab 3    losartan (COZAAR) 100 mg tablet Take 1 Tab by mouth daily. 30 Tab 3    atorvastatin (LIPITOR) 80 mg tablet Take 1 Tab by mouth daily. 30 Tab 3    aspirin delayed-release 81 mg tablet Take 1 Tab by mouth daily. 30 Tab 3       Allergies  No Known Allergies    Family History  No family history on file. Social History  Social History     Social History    Marital status: SINGLE     Spouse name: N/A    Number of children: N/A    Years of education: N/A     Occupational History    Not on file.      Social History Main Topics    Smoking status: Current Every Day Smoker     Packs/day: 0.50     Types: Cigarettes    Smokeless tobacco: Never Used    Alcohol use 0.0 oz/week     0 Standard drinks or equivalent per week      Comment: occassionally     Drug use: No    Sexual activity: No     Other Topics Concern    Not on file     Social History Narrative       Review of Systems  Review of Systems - History obtained from chart review and the patient  General ROS: negative for - chills, fatigue, fever or malaise  Psychological ROS: negative  Ophthalmic ROS: negative  ENT ROS: negative  Allergy and Immunology ROS: negative  Hematological and Lymphatic ROS: negative  Endocrine ROS: dm2  Respiratory ROS: no cough, shortness of breath, or wheezing  Cardiovascular ROS: no chest pain or dyspnea on exertion  Gastrointestinal ROS: no abdominal pain, change in bowel habits, or black or bloody stools  Genito-Urinary ROS: no dysuria, trouble voiding, or hematuria  Musculoskeletal ROS: low back pain   Neurological ROS: negative  Dermatological ROS: negative    Vital Signs  Visit Vitals    /78 (BP 1 Location: Left arm, BP Patient Position: Sitting)    Pulse 92    Temp 98.2 °F (36.8 °C) (Oral)    Resp 16    Ht 5' 6\" (1.676 m)    Wt 163 lb (73.9 kg)    SpO2 98%    BMI 26.31 kg/m2         Physical Exam  Physical Examination: General appearance - oriented to person, place, and time, acyanotic, in no respiratory distress and well hydrated  Mental status - alert, oriented to person, place, and time, affect appropriate to mood  Nose - normal and patent, no erythema, discharge or polyps and normal nontender sinuses  Mouth - mucous membranes moist, pharynx normal without lesions  Neck - supple, no significant adenopathy  Lymphatics - no palpable lymphadenopathy, no hepatosplenomegaly  Chest - clear to auscultation, no wheezes, rales or rhonchi, symmetric air entry  Heart - normal rate and regular rhythm, S1 and S2 normal  Back exam - limited range of motion, pain with motion noted during exam  Neurological - alert, oriented, normal speech, no focal findings or movement disorder noted  Musculoskeletal - no joint tenderness, deformity or swelling  Extremities - no pedal edema noted, intact peripheral pulses    Results  Results for orders placed or performed in visit on 02/23/17   OCCULT BLOOD, IMMUNOASSAY   Result Value Ref Range    Occult blood fecal, by IA Negative Negative       ASSESSMENT and PLAN  There are no diagnoses linked to this encounter. ICD-10-CM ICD-9-CM    1.  Type 2 diabetes mellitus without complication, without long-term current use of insulin ([facility]) E11.9 250.00 CBC WITH AUTOMATED DIFF      HEMOGLOBIN A1C WITH EAG      MICROALBUMIN, UR, RAND W/ MICROALBUMIN/CREA RATIO      SITagliptin-metFORMIN (JANUMET XR) 100-1,000 mg TM24      aspirin delayed-release 81 mg tablet      AMB POC URINALYSIS DIP STICK AUTO W/O MICRO      HM DIABETES FOOT EXAM      CBC WITH AUTOMATED DIFF      HEMOGLOBIN A1C WITH EAG MICROALBUMIN, UR, RAND W/ MICROALBUMIN/CREA RATIO      MO COLLECTION VENOUS BLOOD,VENIPUNCTURE   2. Essential hypertension L82 535.3 METABOLIC PANEL, COMPREHENSIVE      losartan (COZAAR) 100 mg tablet      atorvastatin (LIPITOR) 80 mg tablet      METABOLIC PANEL, COMPREHENSIVE      MO COLLECTION VENOUS BLOOD,VENIPUNCTURE   3. Other hyperlipidemia E78.4 272.4 LIPID PANEL      LIPID PANEL      MO COLLECTION VENOUS BLOOD,VENIPUNCTURE   4. Tobacco use disorder F17.200 305.1 MO COLLECTION VENOUS BLOOD,VENIPUNCTURE   5. Midline low back pain without sciatica, unspecified chronicity M54.5 724.2 naproxen (NAPROSYN) 500 mg tablet   6. Encounter for immunization Z23 V03.89 INFLUENZA VIRUS VAC QUAD,SPLIT,PRESV FREE SYRINGE IM      MO IMMUNIZ ADMIN,1 SINGLE/COMB VAC/TOXOID   7. Comprehensive diabetic foot examination, type 2 DM, [facility] E11.9 250.00 HM DIABETES FOOT EXAM     lab results and schedule of future lab studies reviewed with patient  reviewed diet, exercise and weight control  very strongly urged to quit smoking to reduce cardiovascular risk  cardiovascular risk and specific lipid/LDL goals reviewed  reviewed medications and side effects in detail  specific diabetic recommendations: low cholesterol diet, weight control and daily exercise discussed, home glucose monitoring emphasized, all medications, side effects and compliance discussed carefully, foot care discussed and Podiatry visits discussed, annual eye examinations at Ophthalmology discussed, glycohemoglobin and other lab monitoring discussed and long term diabetic complications discussed  use of aspirin to prevent MI and TIA's discussed    I have discussed the diagnosis with the patient and the intended plan of care as seen in the above orders. The patient has received an after-visit summary and questions were answered concerning future plans. I have discussed medication, side effects, and warnings with the patient in detail.  The patient verbalized understanding and is in agreement with the plan of care. The patient will contact the office with any additional concerns.     Panfilo Nuñez MD

## 2017-12-21 NOTE — MR AVS SNAPSHOT
Visit Information Date & Time Provider Department Dept. Phone Encounter #  
 12/21/2017 11:00 AM Estuardoed Nayely Rose MD [facility] [phone] [id number] Follow-up Instructions Return in about 2 months (around 2/21/2018), or if symptoms worsen or fail to improve, for pap smear, dm2. Upcoming Health Maintenance Date Due HEMOGLOBIN A1C Q6M 7/12/2017 Influenza Age 5 to Adult 8/1/2017 MICROALBUMIN Q1 10/12/2017 PAP AKA CERVICAL CYTOLOGY 1/7/2018 LIPID PANEL Q1 1/12/2018 FOBT Q 1 YEAR AGE 50-75 2/23/2018 MEDICARE YEARLY EXAM 2/24/2018 EYE EXAM RETINAL OR DILATED Q1 4/6/2018 FOOT EXAM Q1 12/21/2018 DTaP/Tdap/Td series (2 - Td) 7/12/2026 Allergies as of 12/21/2017  Review Complete On: 12/21/2017 By: Fausto Sparrow MD  
 No Known Allergies Current Immunizations  Reviewed on 11/15/2016 Name Date Influenza Vaccine (Quad) PF 10/12/2016 Pneumococcal Polysaccharide (PPSV-23) 10/12/2016 Tdap 7/12/2016 Not reviewed this visit You Were Diagnosed With   
  
 Codes Comments Type 2 diabetes mellitus without complication, without long-term current use of insulin (HCC)    -  Primary ICD-10-CM: E11.9 ICD-9-CM: 250.00 Essential hypertension     ICD-10-CM: I10 
ICD-9-CM: 401.9 Other hyperlipidemia     ICD-10-CM: E78.4 ICD-9-CM: 272.4 Tobacco use disorder     ICD-10-CM: F17.200 ICD-9-CM: 305.1 Essential hypertension with goal blood pressure less than 140/90     ICD-10-CM: I10 
ICD-9-CM: 401.9 Hyperlipidemia, unspecified hyperlipidemia type     ICD-10-CM: E78.5 ICD-9-CM: 272.4 History of CVA (cerebrovascular accident)     ICD-10-CM: Z86.73 
ICD-9-CM: V12.54 Midline low back pain without sciatica, unspecified chronicity     ICD-10-CM: M54.5 ICD-9-CM: 724.2 Vitals BP Pulse Temp Resp Height(growth percentile) Weight(growth percentile) 153/78 (BP 1 Location: Left arm, BP Patient Position: Sitting) 92 98.2 °F (36.8 °C) (Oral) 16 5' 6\" (1.676 m) 163 lb (73.9 kg) SpO2 BMI OB Status Smoking Status 98% 26.31 kg/m2 Having regular periods Current Every Day Smoker BMI and BSA Data Body Mass Index Body Surface Area  
 26.31 kg/m 2 1.86 m 2 Preferred Pharmacy Pharmacy Name Phone [facility] [address] [place] [address] AT [facility] [phone] Your Updated Medication List  
  
   
This list is accurate as of: 12/21/17 11:46 AM.  Always use your most recent med list.  
  
  
  
  
 aspirin delayed-release 81 mg tablet Take 1 Tab by mouth daily. atorvastatin 80 mg tablet Commonly known as:  LIPITOR Take 1 Tab by mouth daily. losartan 100 mg tablet Commonly known as:  COZAAR Take 1 Tab by mouth daily. naproxen 500 mg tablet Commonly known as:  NAPROSYN Take 1 Tab by mouth two (2) times daily as needed. SITagliptin-metFORMIN 100-1,000 mg Tm24 Commonly known as:  JANUMET XR Take 1 Tab by mouth daily. Prescriptions Sent to Pharmacy Refills SITagliptin-metFORMIN (JANUMET XR) 100-1,000 mg TM24 1 Sig: Take 1 Tab by mouth daily. Class: Normal  
 Pharmacy: [facility] [place] [address] Ph #: [phone] Route: Oral  
 losartan (COZAAR) 100 mg tablet 1 Sig: Take 1 Tab by mouth daily. Class: Normal  
 Pharmacy: [facility] [address] [place] [address] Ph #: [phone] Route: Oral  
 atorvastatin (LIPITOR) 80 mg tablet 1 Sig: Take 1 Tab by mouth daily. Class: Normal  
 Pharmacy: [facility] [place] [address] Ph #: [phone]  Route: Oral  
 aspirin delayed-release 81 mg tablet 1  
 Sig: Take 1 Tab by mouth daily. Class: Normal  
 Pharmacy: [facility] [address] [place] [address] [facility] Ph #: [phone] Route: Oral  
 naproxen (NAPROSYN) 500 mg tablet 2 Sig: Take 1 Tab by mouth two (2) times daily as needed. Class: Normal  
 Pharmacy: [facility] [address] [place] [address] [facility] Ph #: [phone] Route: Oral  
  
Follow-up Instructions Return in about 2 months (around 2/21/2018), or if symptoms worsen or fail to improve, for pap smear, dm2. To-Do List   
 12/21/2017 Lab:  CBC WITH AUTOMATED DIFF   
  
 12/21/2017 Lab:  HEMOGLOBIN A1C WITH EAG   
  
 12/21/2017 Lab:  LIPID PANEL   
  
 12/21/2017 Lab:  METABOLIC PANEL, COMPREHENSIVE   
  
 12/21/2017 Lab:  MICROALBUMIN, UR, RAND W/ MICROALBUMIN/CREA RATIO Introducing [facility]! Carolin Walker introduces [employer] patient portal. Now you can access parts of your medical record, email your doctor's office, and request medication refills online. 1. In your internet browser, go to [email or web address] 2. Click on the First Time User? Click Here link in the Sign In box. You will see the New Member Sign Up page. 3. Enter your [employer] Access Code exactly as it appears below. You will not need to use this code after youve completed the sign-up process. If you do not sign up before the expiration date, you must request a new code. · [employer] Access Code: [email or web address] Expires: 1/2/2018 10:48 AM 
 
4. Enter the last four digits of your Social Security Number (xxxx) and Date of Birth (mm/dd/yyyy) as indicated and click Submit. You will be taken to the next sign-up page. 5. Create a [employer] ID. This will be your [employer] login ID and cannot be changed, so think of one that is secure and easy to remember. 6. Create a [employer] password. You can change your password at any time. 7. Enter your Password Reset Question and Answer. This can be used at a later time if you forget your password. 8. Enter your e-mail address. You will receive e-mail notification when new information is available in [address] 9. Click Sign Up. You can now view and download portions of your medical record. 10. Click the Download Summary menu link to download a portable copy of your medical information. If you have questions, please visit the Frequently Asked Questions section of the [employer] website. Remember, [employer] is NOT to be used for urgent needs. For medical emergencies, dial 911. Now available from your iPhone and Android! Please provide this summary of care documentation to your next provider. Your primary care clinician is listed as Carrillo Alex. If you have any questions after today's visit, please call [phone].

## 2017-12-22 LAB
ALBUMIN SERPL-MCNC: 4.3 G/DL (ref 3.5–5.5)
ALBUMIN/CREAT UR: 10.6 MG/G CREAT (ref 0–30)
ALBUMIN/GLOB SERPL: 1.5 {RATIO} (ref 1.2–2.2)
ALP SERPL-CCNC: 96 IU/L (ref 39–117)
ALT SERPL-CCNC: 9 IU/L (ref 0–32)
AST SERPL-CCNC: 11 IU/L (ref 0–40)
BASOPHILS # BLD AUTO: 0 X10E3/UL (ref 0–0.2)
BASOPHILS NFR BLD AUTO: 0 %
BILIRUB SERPL-MCNC: <0.2 MG/DL (ref 0–1.2)
BUN SERPL-MCNC: 9 MG/DL (ref 6–24)
BUN/CREAT SERPL: 17 (ref 9–23)
CALCIUM SERPL-MCNC: 9.9 MG/DL (ref 8.7–10.2)
CHLORIDE SERPL-SCNC: 99 MMOL/L (ref 96–106)
CHOLEST SERPL-MCNC: 169 MG/DL (ref 100–199)
CO2 SERPL-SCNC: 25 MMOL/L (ref 18–29)
CREAT SERPL-MCNC: 0.52 MG/DL (ref 0.57–1)
CREAT UR-MCNC: 178.1 MG/DL
EOSINOPHIL # BLD AUTO: 0.1 X10E3/UL (ref 0–0.4)
EOSINOPHIL NFR BLD AUTO: 1 %
ERYTHROCYTE [DISTWIDTH] IN BLOOD BY AUTOMATED COUNT: 16.5 % (ref 12.3–15.4)
EST. AVERAGE GLUCOSE BLD GHB EST-MCNC: 148 MG/DL
GLOBULIN SER CALC-MCNC: 2.9 G/DL (ref 1.5–4.5)
GLUCOSE SERPL-MCNC: 131 MG/DL (ref 65–99)
HBA1C MFR BLD: 6.8 % (ref 4.8–5.6)
HCT VFR BLD AUTO: 35.9 % (ref 34–46.6)
HDLC SERPL-MCNC: 51 MG/DL
HGB BLD-MCNC: 11.6 G/DL (ref 11.1–15.9)
IMM GRANULOCYTES # BLD: 0 X10E3/UL (ref 0–0.1)
IMM GRANULOCYTES NFR BLD: 0 %
INTERPRETATION, 910389: NORMAL
LDLC SERPL CALC-MCNC: 97 MG/DL (ref 0–99)
LYMPHOCYTES # BLD AUTO: 3.9 X10E3/UL (ref 0.7–3.1)
LYMPHOCYTES NFR BLD AUTO: 36 %
Lab: NORMAL
MCH RBC QN AUTO: 25.1 PG (ref 26.6–33)
MCHC RBC AUTO-ENTMCNC: 32.3 G/DL (ref 31.5–35.7)
MCV RBC AUTO: 78 FL (ref 79–97)
MICROALBUMIN UR-MCNC: 18.8 UG/ML
MONOCYTES # BLD AUTO: 0.8 X10E3/UL (ref 0.1–0.9)
MONOCYTES NFR BLD AUTO: 8 %
NEUTROPHILS # BLD AUTO: 6 X10E3/UL (ref 1.4–7)
NEUTROPHILS NFR BLD AUTO: 55 %
PLATELET # BLD AUTO: 327 X10E3/UL (ref 150–379)
POTASSIUM SERPL-SCNC: 4 MMOL/L (ref 3.5–5.2)
PROT SERPL-MCNC: 7.2 G/DL (ref 6–8.5)
RBC # BLD AUTO: 4.62 X10E6/UL (ref 3.77–5.28)
SODIUM SERPL-SCNC: 140 MMOL/L (ref 134–144)
TRIGL SERPL-MCNC: 107 MG/DL (ref 0–149)
VLDLC SERPL CALC-MCNC: 21 MG/DL (ref 5–40)
WBC # BLD AUTO: 10.9 X10E3/UL (ref 3.4–10.8)

## 2017-12-26 DIAGNOSIS — E11.9 TYPE 2 DIABETES MELLITUS WITHOUT COMPLICATION, WITHOUT LONG-TERM CURRENT USE OF INSULIN (HCC): ICD-10-CM

## 2018-03-22 ENCOUNTER — OFFICE VISIT (OUTPATIENT)
Dept: FAMILY MEDICINE CLINIC | Age: 53
End: 2018-03-22

## 2018-03-22 VITALS
RESPIRATION RATE: 18 BRPM | TEMPERATURE: 97.5 F | WEIGHT: 168 LBS | HEIGHT: 66 IN | HEART RATE: 72 BPM | BODY MASS INDEX: 27 KG/M2 | SYSTOLIC BLOOD PRESSURE: 134 MMHG | DIASTOLIC BLOOD PRESSURE: 76 MMHG | OXYGEN SATURATION: 99 %

## 2018-03-22 DIAGNOSIS — E78.49 OTHER HYPERLIPIDEMIA: ICD-10-CM

## 2018-03-22 DIAGNOSIS — Z12.4 SCREENING FOR CERVICAL CANCER: ICD-10-CM

## 2018-03-22 DIAGNOSIS — E11.9 TYPE 2 DIABETES MELLITUS WITHOUT COMPLICATION, WITHOUT LONG-TERM CURRENT USE OF INSULIN (HCC): ICD-10-CM

## 2018-03-22 DIAGNOSIS — F17.200 TOBACCO USE DISORDER: ICD-10-CM

## 2018-03-22 DIAGNOSIS — E66.3 OVERWEIGHT (BMI 25.0-29.9): ICD-10-CM

## 2018-03-22 DIAGNOSIS — Z12.11 SCREEN FOR COLON CANCER: ICD-10-CM

## 2018-03-22 DIAGNOSIS — Z12.39 SCREENING BREAST EXAMINATION: ICD-10-CM

## 2018-03-22 DIAGNOSIS — Z01.419 WELL WOMAN EXAM: Primary | ICD-10-CM

## 2018-03-22 DIAGNOSIS — I10 ESSENTIAL HYPERTENSION: ICD-10-CM

## 2018-03-22 RX ORDER — METFORMIN HYDROCHLORIDE 1000 MG/1
1000 TABLET ORAL 2 TIMES DAILY WITH MEALS
Qty: 60 TAB | Refills: 2 | Status: SHIPPED | OUTPATIENT
Start: 2018-03-22 | End: 2018-04-19 | Stop reason: SDUPTHER

## 2018-03-22 NOTE — PROGRESS NOTES
Chief Complaint   Patient presents with   [facility] Exam     pap smear      1. Have you been to the ER, urgent care clinic since your last visit? Hospitalized since your last visit? No    2. Have you seen or consulted any other health care providers outside of the [address] [place] [address] since your last visit? Include any pap smears or colon screening. No     [facility] comes in for well woman exam.  Wellness exam: Pap smear: Patient does need to have Pap smear done. We did do this today. Patient will be referred for mammogram.  We did give a kit for colon cancer screening. Diabetes mellitus type 2: Patient has type 2 diabetes mellitus. Last HbA1c was 6.8. She has been off her Janumet for more than 2 months. Is not taking any diabetes medication. I will prescribe metformin 1000 mg twice daily. She will keep a blood glucose log and follow-up in a month. Hypertension: Patient is on losartan. We will continue with the current treatment plan. Blood pressure is stable. Dyslipidemia: Patient takes Lipitor 80 mg daily. Plan to check lipid panel at next visit. CVA: Patient has a history of a stroke in the past.  She is on aspirin. Also takes atorvastatin. Will continue with the current treatment plan. Back pain: Patient on naproxen twice a day as needed for pain and discomfort. Will encourage continued supportive care measures. Overweight: Patient is a BMI of 27. 12. Will do lifestyle and dietary modification in an effort to bring this down. Past Medical History  Past Medical History:   Diagnosis Date    Diabetes ([address]     Hyperlipidemia     Hypertension     Stroke [facility])     2012       Surgical History  No past surgical history on file. Medications  Current Outpatient Prescriptions   Medication Sig Dispense Refill    metFORMIN (GLUCOPHAGE) 1,000 mg tablet Take 1 Tab by mouth two (2) times daily (with meals). 60 Tab 2    losartan (COZAAR) 100 mg tablet Take 1 Tab by mouth daily.  90 Tab 1    atorvastatin (LIPITOR) 80 mg tablet Take 1 Tab by mouth daily. 90 Tab 1    aspirin delayed-release 81 mg tablet Take 1 Tab by mouth daily. 90 Tab 1    naproxen (NAPROSYN) 500 mg tablet Take 1 Tab by mouth two (2) times daily as needed. 60 Tab 2       Allergies  No Known Allergies    Family History  No family history on file. Social History  Social History     Social History    Marital status: SINGLE     Spouse name: N/A    Number of children: N/A    Years of education: N/A     Occupational History    Not on file.      Social History Main Topics    Smoking status: Current Every Day Smoker     Packs/day: 0.50     Types: Cigarettes    Smokeless tobacco: Never Used    Alcohol use 0.0 oz/week     0 Standard drinks or equivalent per week      Comment: occassionally     Drug use: No    Sexual activity: No     Other Topics Concern    Not on file     Social History Narrative       Review of Systems  Review of Systems - History obtained from chart review and the patient  General ROS: positive for  - fatigue and malaise  negative for - chills or fever  Psychological ROS: negative  Ophthalmic ROS: negative  ENT ROS: negative  Allergy and Immunology ROS: negative  Hematological and Lymphatic ROS: negative  Endocrine ROS: dm2  Breast ROS: negative for breast lumps  Respiratory ROS: no cough, shortness of breath, or wheezing  Cardiovascular ROS: no chest pain or dyspnea on exertion  Gastrointestinal ROS: positive for - heartburn  Genito-Urinary ROS: no dysuria, trouble voiding, or hematuria  Musculoskeletal ROS: negative  Neurological ROS: negative  Dermatological ROS: negative    Vital Signs  Visit Vitals    /76 (BP 1 Location: Left arm, BP Patient Position: Sitting)    Pulse 72    Temp 97.5 °F (36.4 °C) (Oral)    Resp 18    Ht 5' 6\" (1.676 m)    Wt 168 lb (76.2 kg)    SpO2 99%    BMI 27.12 kg/m2         Physical Exam  Physical Examination: General appearance - alert, well appearing, and in no distress, oriented to person, place, and time and acyanotic, in no respiratory distress  Mental status - alert, oriented to person, place, and time, normal mood, behavior, speech, dress, motor activity, and thought processes  Eyes - sclera anicteric  Ears - hearing grossly normal bilaterally  Nose - normal and patent, no erythema, discharge or polyps and normal nontender sinuses  Mouth - mucous membranes moist, pharynx normal without lesions  Neck - supple, no significant adenopathy  Lymphatics - no palpable lymphadenopathy, no hepatosplenomegaly  Chest - clear to auscultation, no wheezes, rales or rhonchi, symmetric air entry  Heart - normal rate, regular rhythm, normal S1, S2, no murmurs, rubs, clicks or gallops  Abdomen - soft, nontender, nondistended, no masses or organomegaly  Pelvic - normal external genitalia, vulva, vagina, cervix, uterus and adnexa, PAP: Pap smear done today, exam chaperoned by Rishabh Bobo LPN. Neurological - alert, oriented, normal speech, no focal findings or movement disorder noted, motor and sensory grossly normal bilaterally  Musculoskeletal - full range of motion without pain  Extremities - no pedal edema noted, intact peripheral pulses    Results  Results for orders placed or performed in visit on 12/21/17   CBC WITH AUTOMATED DIFF   Result Value Ref Range    WBC 10.9 (H) 3.4 - 10.8 x10E3/uL    RBC 4.62 3.77 - 5.28 x10E6/uL    HGB 11.6 11.1 - 15.9 g/dL    HCT 35.9 34.0 - 46.6 %    MCV 78 (L) 79 - 97 fL    MCH 25.1 (L) 26.6 - 33.0 pg    MCHC 32.3 31.5 - 35.7 g/dL    RDW 16.5 (H) 12.3 - 15.4 %    PLATELET 295 626 - 591 x10E3/uL    NEUTROPHILS 55 Not Estab. %    Lymphocytes 36 Not Estab. %    MONOCYTES 8 Not Estab. %    EOSINOPHILS 1 Not Estab. %    BASOPHILS 0 Not Estab. %    ABS. NEUTROPHILS 6.0 1.4 - 7.0 x10E3/uL    Abs Lymphocytes 3.9 (H) 0.7 - 3.1 x10E3/uL    ABS. MONOCYTES 0.8 0.1 - 0.9 x10E3/uL    ABS. EOSINOPHILS 0.1 0.0 - 0.4 x10E3/uL    ABS.  BASOPHILS 0.0 0.0 - 0.2 x10E3/uL    IMMATURE GRANULOCYTES 0 Not Estab. %    ABS. IMM. GRANS. 0.0 0.0 - 0.1 x10E3/uL   LIPID PANEL   Result Value Ref Range    Cholesterol, total 169 100 - 199 mg/dL    Triglyceride 107 0 - 149 mg/dL    HDL Cholesterol 51 >39 mg/dL    VLDL, calculated 21 5 - 40 mg/dL    LDL, calculated 97 0 - 99 mg/dL   HEMOGLOBIN A1C WITH EAG   Result Value Ref Range    Hemoglobin A1c 6.8 (H) 4.8 - 5.6 %    Estimated average glucose 304 mg/dL   METABOLIC PANEL, COMPREHENSIVE   Result Value Ref Range    Glucose 131 (H) 65 - 99 mg/dL    BUN 9 6 - 24 mg/dL    Creatinine 0.52 (L) 0.57 - 1.00 mg/dL    GFR est non- >59 mL/min/1.73    GFR est  >59 mL/min/1.73    BUN/Creatinine ratio 17 9 - 23    Sodium 140 134 - 144 mmol/L    Potassium 4.0 3.5 - 5.2 mmol/L    Chloride 99 96 - 106 mmol/L    CO2 25 18 - 29 mmol/L    Calcium 9.9 8.7 - 10.2 mg/dL    Protein, total 7.2 6.0 - 8.5 g/dL    Albumin 4.3 3.5 - 5.5 g/dL    GLOBULIN, TOTAL 2.9 1.5 - 4.5 g/dL    A-G Ratio 1.5 1.2 - 2.2    Bilirubin, total <0.2 0.0 - 1.2 mg/dL    Alk.  phosphatase 96 39 - 117 IU/L    AST (SGOT) 11 0 - 40 IU/L    ALT (SGPT) 9 0 - 32 IU/L   MICROALBUMIN, UR, RAND W/ MICROALBUMIN/CREA RATIO   Result Value Ref Range    Creatinine, urine 178.1 Not Estab. mg/dL    Microalbumin, urine 18.8 Not Estab. ug/mL    Microalb/Creat ratio (ug/mg creat.) 10.6 0.0 - 30.0 mg/g creat   CVD REPORT   Result Value Ref Range    INTERPRETATION Note    DIABETES PATIENT EDUCATION   Result Value Ref Range    PDF Image Not applicable    AMB POC URINALYSIS DIP STICK AUTO W/O MICRO   Result Value Ref Range    Color (UA POC) Yellow     Clarity (UA POC) Clear     Glucose (UA POC) Negative Negative    Bilirubin (UA POC) Negative Negative    Ketones (UA POC) Negative Negative    Specific gravity (UA POC) 1.025 1.001 - 1.035    Blood (UA POC) Negative Negative    pH (UA POC) 6.0 4.6 - 8.0    Protein (UA POC) Trace Negative    Urobilinogen (UA POC) 0.2 mg/dL 0.2 - 1    Nitrites (UA POC) Negative Negative    Leukocyte esterase (UA POC) Negative Negative       ASSESSMENT and PLAN    ICD-10-CM ICD-9-CM    1. Well woman exam Z01.419 V72.31    2. Screening for cervical cancer Z12.4 V76.2 PAP IG, CT-NG-TV, APTIMA HPV AND RFX 88/99,77([id number])      PAP IG, CT-NG-TV, APTIMA HPV AND RFX 20/21,59([id number])   3. Screening breast examination Z12.31 V76.10 [facility] BI SCREENING INCL CAD   4. Type 2 diabetes mellitus without complication, without long-term current use of insulin (HCC) E11.9 250.00 metFORMIN (GLUCOPHAGE) 1,000 mg tablet   5. Tobacco use disorder F17.200 305.1    6. Essential hypertension I10 401.9    7. Other hyperlipidemia E78.4 272.4    8. Screen for colon cancer Z12.11 V76.51 OCCULT BLOOD, IMMUNOASSAY (FIT)   9. Overweight (BMI 25.0-29. 9) E66.3 278.02      lab results and schedule of future lab studies reviewed with patient  reviewed diet, exercise and weight control  very strongly urged to quit smoking to reduce cardiovascular risk  cardiovascular risk and specific lipid/LDL goals reviewed  reviewed medications and side effects in detail  specific diabetic recommendations: low cholesterol diet, weight control and daily exercise discussed, home glucose monitoring emphasized, all medications, side effects and compliance discussed carefully, annual eye examinations at Ophthalmology discussed, glycohemoglobin and other lab monitoring discussed and long term diabetic complications discussed  use of aspirin to prevent MI and TIA's discussed  Discussed the patient's BMI with her. The BMI follow up plan is as follows:     dietary management education, guidance, and counseling  encourage exercise  monitor weight  I have discussed the diagnosis with the patient and the intended plan of care as seen in the above orders. The patient has received an after-visit summary and questions were answered concerning future plans.  I have discussed medication, side effects, and warnings with the patient in detail. The patient verbalized understanding and is in agreement with the plan of care. The patient will contact the office with any additional concerns.     Hien Quintero MD

## 2018-03-22 NOTE — MR AVS SNAPSHOT
[facility] [address] [facility] 
[phone] Patient: Nallely Colby MRN: [id number] TKP:[id number] Visit Information Date & Time Provider Department Dept. Phone Encounter #  
 3/22/2018  9:30 AM Estuardoed Toya Moser MD [facility] Beverley Gomez [phone] [id number] Follow-up Instructions Return in about 1 month (around 4/22/2018), or if symptoms worsen or fail to improve, for dm2, htn. Upcoming Health Maintenance Date Due  
 BREAST CANCER SCRN MAMMOGRAM 1/22/2016 PAP AKA CERVICAL CYTOLOGY 1/7/2018 FOBT Q 1 YEAR AGE 50-75 2/23/2018 EYE EXAM RETINAL OR DILATED Q1 4/6/2018 HEMOGLOBIN A1C Q6M 6/21/2018 FOOT EXAM Q1 12/21/2018 MICROALBUMIN Q1 12/21/2018 LIPID PANEL Q1 12/21/2018 DTaP/Tdap/Td series (2 - Td) 7/12/2026 Allergies as of 3/22/2018  Review Complete On: 12/21/2017 By: Sheldon Duenas MD  
 No Known Allergies Current Immunizations  Reviewed on 11/15/2016 Name Date Influenza Vaccine (Quad) PF 12/21/2017, 10/12/2016 Pneumococcal Polysaccharide (PPSV-23) 10/12/2016 Tdap 7/12/2016 Not reviewed this visit You Were Diagnosed With   
  
 Codes Comments Well woman exam    -  Primary ICD-10-CM: S96.551 ICD-9-CM: V72.31 Screening for cervical cancer     ICD-10-CM: Z12.4 ICD-9-CM: V76.2 Screening breast examination     ICD-10-CM: Z12.31 
ICD-9-CM: V76.10 Type 2 diabetes mellitus without complication, without long-term current use of insulin (HCC)     ICD-10-CM: E11.9 ICD-9-CM: 250.00 Tobacco use disorder     ICD-10-CM: F17.200 ICD-9-CM: 305.1 Essential hypertension     ICD-10-CM: I10 
ICD-9-CM: 401.9 Other hyperlipidemia     ICD-10-CM: E78.4 ICD-9-CM: 272.4 Screen for colon cancer     ICD-10-CM: Z12.11 ICD-9-CM: V76.51 Vitals BP Pulse Temp Resp Height(growth percentile) Weight(growth percentile) 144/76 (BP 1 Location: Left arm, BP Patient Position: Sitting) 72 97.5 °F (36.4 °C) (Oral) 18 5' 6\" (1.676 m) 168 lb (76.2 kg) SpO2 BMI OB Status Smoking Status 99% 27.12 kg/m2 Having regular periods Current Every Day Smoker BMI and BSA Data Body Mass Index Body Surface Area  
 27.12 kg/m 2 1.88 m 2 Preferred Pharmacy Pharmacy Name Phone [facility] [address] [place] [address] AT [facility] [phone] Your Updated Medication List  
  
   
This list is accurate as of 3/22/18  9:50 AM.  Always use your most recent med list.  
  
  
  
  
 aspirin delayed-release 81 mg tablet Take 1 Tab by mouth daily. atorvastatin 80 mg tablet Commonly known as:  LIPITOR Take 1 Tab by mouth daily. losartan 100 mg tablet Commonly known as:  COZAAR Take 1 Tab by mouth daily. metFORMIN 1,000 mg tablet Commonly known as:  GLUCOPHAGE Take 1 Tab by mouth two (2) times daily (with meals). naproxen 500 mg tablet Commonly known as:  NAPROSYN Take 1 Tab by mouth two (2) times daily as needed. Prescriptions Sent to Pharmacy Refills  
 metFORMIN (GLUCOPHAGE) 1,000 mg tablet 2 Sig: Take 1 Tab by mouth two (2) times daily (with meals). Class: Normal  
 Pharmacy: [facility] [address] [place] [address] [address]  #: [phone] Route: Oral  
  
Follow-up Instructions Return in about 1 month (around 4/22/2018), or if symptoms worsen or fail to improve, for dm2, htn. To-Do List   
 03/22/2018 Imaging:  [facility] BI SCREENING INCL CAD   
  
 03/22/2018 Pathology:  PAP IG, CT-NG-TV, APTIMA HPV AND [address] 35/52,53([id number])   
  
 04/21/2018 Lab:  OCCULT BLOOD, IMMUNOASSAY (FIT) Introducing [facility]!    
 Buddy Youngblood introduces [employer] patient portal. Now you can access parts of your medical record, email your doctor's office, and request medication refills online. 1. In your internet browser, go to [email or web address] 2. Click on the First Time User? Click Here link in the Sign In box. You will see the New Member Sign Up page. 3. Enter your [employer] Access Code exactly as it appears below. You will not need to use this code after youve completed the sign-up process. If you do not sign up before the expiration date, you must request a new code. · [employer] Access Code: [email or web address] Expires: 6/20/2018  9:50 AM 
 
4. Enter the last four digits of your Social Security Number (xxxx) and Date of Birth (mm/dd/yyyy) as indicated and click Submit. You will be taken to the next sign-up page. 5. Create a [employer] ID. This will be your [employer] login ID and cannot be changed, so think of one that is secure and easy to remember. 6. Create a [employer] password. You can change your password at any time. 7. Enter your Password Reset Question and Answer. This can be used at a later time if you forget your password. 8. Enter your e-mail address. You will receive e-mail notification when new information is available in [address] 9. Click Sign Up. You can now view and download portions of your medical record. 10. Click the Download Summary menu link to download a portable copy of your medical information. If you have questions, please visit the Frequently Asked Questions section of the [employer] website. Remember, [employer] is NOT to be used for urgent needs. For medical emergencies, dial 911. Now available from your iPhone and Android! Please provide this summary of care documentation to your next provider. Your primary care clinician is listed as Carrillo Alex. If you have any questions after today's visit, please call [phone].

## 2018-03-23 NOTE — PATIENT INSTRUCTIONS
Body Mass Index: Care Instructions  Your Care Instructions    Body mass index (BMI) can help you see if your weight is raising your risk for health problems. It uses a formula to compare how much you weigh with how tall you are. · A BMI lower than 18.5 is considered underweight. · A BMI between 18.5 and 24.9 is considered healthy. · A BMI between 25 and 29.9 is considered overweight. A BMI of 30 or higher is considered obese. If your BMI is in the normal range, it means that you have a lower risk for weight-related health problems. If your BMI is in the overweight or obese range, you may be at increased risk for weight-related health problems, such as high blood pressure, heart disease, stroke, arthritis or joint pain, and diabetes. If your BMI is in the underweight range, you may be at increased risk for health problems such as fatigue, lower protection (immunity) against illness, muscle loss, bone loss, hair loss, and hormone problems. BMI is just one measure of your risk for weight-related health problems. You may be at higher risk for health problems if you are not active, you eat an unhealthy diet, or you drink too much alcohol or use tobacco products. Follow-up care is a key part of your treatment and safety. Be sure to make and go to all appointments, and call your doctor if you are having problems. It's also a good idea to know your test results and keep a list of the medicines you take. How can you care for yourself at home? · Practice healthy eating habits. This includes eating plenty of fruits, vegetables, whole grains, lean protein, and low-fat dairy. · If your doctor recommends it, get more exercise. Walking is a good choice. Bit by bit, increase the amount you walk every day. Try for at least 30 minutes on most days of the week. · Do not smoke. Smoking can increase your risk for health problems. If you need help quitting, talk to your doctor about stop-smoking programs and medicines. These can increase your chances of quitting for good. · Limit alcohol to 2 drinks a day for men and 1 drink a day for women. Too much alcohol can cause health problems. If you have a BMI higher than 25  · Your doctor may do other tests to check your risk for weight-related health problems. This may include measuring the distance around your waist. A waist measurement of more than 40 inches in men or 35 inches in women can increase the risk of weight-related health problems. · Talk with your doctor about steps you can take to stay healthy or improve your health. You may need to make lifestyle changes to lose weight and stay healthy, such as changing your diet and getting regular exercise. If you have a BMI lower than 18.5  · Your doctor may do other tests to check your risk for health problems. · Talk with your doctor about steps you can take to stay healthy or improve your health. You may need to make lifestyle changes to gain or maintain weight and stay healthy, such as getting more healthy foods in your diet and doing exercises to build muscle. Where can you learn more? Go to http://kurt-lam.info/. Enter S176 in the search box to learn more about \"Body Mass Index: Care Instructions. \"  Current as of: October 13, 2016  Content Version: 11.4  © 7234-3800 [employer]wise, [employer]. Care instructions adapted under license by [employer] (which disclaims liability or warranty for this information). If you have questions about a medical condition or this instruction, always ask your healthcare professional. [address] any warranty or liability for your use of this information.

## 2018-03-26 LAB
C TRACH RRNA CVX QL NAA+PROBE: NEGATIVE
CYTOLOGIST CVX/VAG CYTO: ABNORMAL
CYTOLOGY CVX/VAG DOC THIN PREP: ABNORMAL
DX ICD CODE: ABNORMAL
HPV I/H RISK 4 DNA CVX QL PROBE+SIG AMP: NEGATIVE
Lab: ABNORMAL
N GONORRHOEA RRNA CVX QL NAA+PROBE: NEGATIVE
OTHER STN SPEC: ABNORMAL
PATH REPORT.FINAL DX SPEC: ABNORMAL
STAT OF ADQ CVX/VAG CYTO-IMP: ABNORMAL
T VAGINALIS RRNA SPEC QL NAA+PROBE: POSITIVE

## 2018-03-27 RX ORDER — METRONIDAZOLE 500 MG/1
500 TABLET ORAL 3 TIMES DAILY
Qty: 21 TAB | Refills: 0 | Status: SHIPPED | OUTPATIENT
Start: 2018-03-27 | End: 2018-04-03

## 2018-03-28 NOTE — PROGRESS NOTES
Spoke with patient with results at this time. Patient verbalized understanding at this time. No other concerns noted. Informed patient to inform her partner as well so he can get treatment. Patient verbalized understanding as well

## 2018-03-28 NOTE — PROGRESS NOTES
Please let patient know that the her Pap smear was negative for cervical cancer. She does have trichomonas and that would cause her vaginal discharge. I will send in prescription of Flagyl to take 3 times a day for a week. Repeat Pap in 3-5 years.   Joann Don MD

## 2018-04-19 ENCOUNTER — OFFICE VISIT (OUTPATIENT)
Dept: FAMILY MEDICINE CLINIC | Age: 53
End: 2018-04-19

## 2018-04-19 VITALS
SYSTOLIC BLOOD PRESSURE: 136 MMHG | HEIGHT: 66 IN | OXYGEN SATURATION: 98 % | RESPIRATION RATE: 18 BRPM | BODY MASS INDEX: 26.84 KG/M2 | WEIGHT: 167 LBS | DIASTOLIC BLOOD PRESSURE: 72 MMHG | TEMPERATURE: 96.9 F | HEART RATE: 82 BPM

## 2018-04-19 DIAGNOSIS — E11.9 TYPE 2 DIABETES MELLITUS WITHOUT COMPLICATION, WITHOUT LONG-TERM CURRENT USE OF INSULIN (HCC): Primary | ICD-10-CM

## 2018-04-19 DIAGNOSIS — F17.200 TOBACCO USE DISORDER: ICD-10-CM

## 2018-04-19 DIAGNOSIS — E78.49 OTHER HYPERLIPIDEMIA: ICD-10-CM

## 2018-04-19 DIAGNOSIS — M54.50 MIDLINE LOW BACK PAIN WITHOUT SCIATICA, UNSPECIFIED CHRONICITY: ICD-10-CM

## 2018-04-19 DIAGNOSIS — Z00.00 ENCOUNTER FOR MEDICARE ANNUAL WELLNESS EXAM: ICD-10-CM

## 2018-04-19 DIAGNOSIS — I10 ESSENTIAL HYPERTENSION: ICD-10-CM

## 2018-04-19 DIAGNOSIS — Z71.89 ACP (ADVANCE CARE PLANNING): ICD-10-CM

## 2018-04-19 RX ORDER — LOSARTAN POTASSIUM 100 MG/1
100 TABLET ORAL DAILY
Qty: 90 TAB | Refills: 1 | Status: SHIPPED | OUTPATIENT
Start: 2018-04-19 | End: 2019-06-10 | Stop reason: SDUPTHER

## 2018-04-19 RX ORDER — METFORMIN HYDROCHLORIDE 1000 MG/1
1000 TABLET ORAL 2 TIMES DAILY WITH MEALS
Qty: 180 TAB | Refills: 1 | Status: SHIPPED | OUTPATIENT
Start: 2018-04-19 | End: 2018-07-19 | Stop reason: SDUPTHER

## 2018-04-19 RX ORDER — ASPIRIN 81 MG/1
81 TABLET ORAL DAILY
Qty: 90 TAB | Refills: 1 | Status: SHIPPED | OUTPATIENT
Start: 2018-04-19 | End: 2019-06-10 | Stop reason: SDUPTHER

## 2018-04-19 RX ORDER — NAPROXEN 500 MG/1
500 TABLET ORAL
Qty: 60 TAB | Refills: 2 | Status: SHIPPED | OUTPATIENT
Start: 2018-04-19 | End: 2018-10-22

## 2018-04-19 RX ORDER — ATORVASTATIN CALCIUM 80 MG/1
80 TABLET, FILM COATED ORAL DAILY
Qty: 90 TAB | Refills: 1 | Status: SHIPPED | OUTPATIENT
Start: 2018-04-19 | End: 2019-06-10 | Stop reason: SDUPTHER

## 2018-04-19 NOTE — PROGRESS NOTES
Chief Complaint   Patient presents with   [address] [place] [address] Annual Wellness Visit     1. Have you been to the ER, urgent care clinic since your last visit? Hospitalized since your last visit? No    2. Have you seen or consulted any other health care providers outside of the [address] [place] [address] since your last visit? Include any pap smears or colon screening. No      This is the Subsequent Medicare Annual Wellness Exam, performed 12 months or more after the Initial AWV or the last Subsequent AWV    I have reviewed the patient's medical history in detail and updated the computerized patient record. History   Alie Freeman comes in for [facility] wellness visit. Past Medical History:   Diagnosis Date    Diabetes ([facility]     Hyperlipidemia     Hypertension     Stroke ([facility]     2012      No past surgical history on file. Current Outpatient Prescriptions   Medication Sig Dispense Refill    metFORMIN (GLUCOPHAGE) 1,000 mg tablet Take 1 Tab by mouth two (2) times daily (with meals). 60 Tab 2    losartan (COZAAR) 100 mg tablet Take 1 Tab by mouth daily. 90 Tab 1    atorvastatin (LIPITOR) 80 mg tablet Take 1 Tab by mouth daily. 90 Tab 1    aspirin delayed-release 81 mg tablet Take 1 Tab by mouth daily. 90 Tab 1    naproxen (NAPROSYN) 500 mg tablet Take 1 Tab by mouth two (2) times daily as needed. 60 Tab 2     No Known Allergies  No family history on file.   Social History   Substance Use Topics    Smoking status: Current Every Day Smoker     Packs/day: 0.50     Types: Cigarettes    Smokeless tobacco: Never Used    Alcohol use 0.0 oz/week     0 Standard drinks or equivalent per week      Comment: occassionally      Patient Active Problem List   Diagnosis Code    Midline low back pain without sciatica M54.5    Tobacco use disorder F17.200    History of CVA (cerebrovascular accident) Z80.78    Essential hypertension I10    Hyperlipidemia E78.5    Type 2 diabetes mellitus without complication ([facility] E57.5    Type 2 diabetes mellitus without complication, without long-term current use of insulin (HCC) E11.9    ACP (advance care planning) Z71.89       Depression Risk Factor Screening:     PHQ over the last two weeks 4/19/2018   Little interest or pleasure in doing things Not at all   Feeling down, depressed or hopeless Not at all   Total Score PHQ 2 0   Trouble falling or staying asleep, or sleeping too much -   Feeling tired or having little energy -   Poor appetite or overeating -   Feeling bad about yourself - or that you are a failure or have let yourself or your family down -   Trouble concentrating on things such as school, work, reading or watching TV -   Moving or speaking so slowly that other people could have noticed; or the opposite being so fidgety that others notice -   Thoughts of being better off dead, or hurting yourself in some way -   PHQ 9 Score -   How difficult have these problems made it for you to do your work, take care of your home and get along with others -     Alcohol Risk Factor Screening: You do not drink alcohol or very rarely. Functional Ability and Level of Safety:   1. Was the patient's timed Up and GO test unsteady or longer than 30 seconds? No  2. Does the patient need help with the phone, transportation, shopping, preparing meals, housework, laundry, medications or managing money? No  3. Does the patients' home have rugs in the hallway, lack grab bars in the bathroom, lack handrails on the stairs or have poor lighting? No  4. Have you noticed any hearing difficulties? No  Hearing Evaluation:  Hearing Loss  Hearing is good. Activities of Daily Living  The home contains: grab bars  Patient does total self care    Fall Risk  Fall Risk Assessment, last 12 mths 2/23/2017   Able to walk? Yes   Fall in past 12 months?  No       Abuse Screen  Patient is not abused    Cognitive Screening   Evaluation of Cognitive Function:  Has your family/caregiver stated any concerns about your memory: no  Normal    Patient Care Team   Patient Care Team:  Tong Chang MD as PCP - General (Family Practice)  Cipriano Keyes MD (Family Practice)    Assessment/Plan   Education and counseling provided:  Screening Mammography  Colorectal cancer screening tests  Cardiovascular screening blood test  Diabetes screening test    1. Encounter for Medicare annual wellness exam    2. ACP (advance care planning)    Health Maintenance Due   Topic Date Due    BREAST CANCER SCRN MAMMOGRAM  01/22/2016    FOBT Q 1 YEAR AGE 50-75  02/23/2018    MEDICARE YEARLY EXAM  03/26/2018    EYE EXAM RETINAL OR DILATED Q1  04/06/2018   I have discussed the diagnosis with the patient and the intended plan of care as seen in the above orders. The patient has received an after-visit summary and questions were answered concerning future plans. I have discussed medication, side effects, and warnings with the patient in detail. The patient verbalized understanding and is in agreement with the plan of care. The patient will contact the office with any additional concerns. Personalized preventative plan of care was discussed, printed and given to patient.     Tong Chang MD

## 2018-04-19 NOTE — PATIENT INSTRUCTIONS
Medicare Part B Preventive Services Limitations Recommendation Scheduled   Bone Mass Measurement  (age 72 & older, biennial) Requires diagnosis related to osteoporosis or estrogen deficiency. Biennial benefit unless patient has history of long-term glucocorticoid tx or baseline is needed because initial test was by other method N/a     Cardiovascular Screening Blood Tests (every 5 years)  Total cholesterol, HDL, Triglycerides Order as a panel if possible Due     Colorectal Cancer Screening  -Fecal occult blood test (annual)  -Flexible sigmoidoscopy (5y)  -Screening colonoscopy (10y)  -Barium Enema  Due     Counseling to Prevent Tobacco Use (up to 8 sessions per year)  - Counseling greater than 3 and up to 10 minutes  - Counseling greater than 10 minutes Patients must be asymptomatic of tobacco-related conditions to receive as preventive service Current smoker     Diabetes Screening Tests (at least every 3 years, Medicare covers annually or at 6-month intervals for prediabetic patients)    Fasting blood sugar (FBS) or glucose tolerance test (GTT) Patient must be diagnosed with one of the following:  -Hypertension, Dyslipidemia, obesity, previous impaired FBS or GTT  Or any two of the following: overweight, FH of diabetes, age ? 72, history of gestational diabetes, birth of baby weighing more than 9 pounds Due     Diabetes Self-Management Training (DSMT) (no USPSTF recommendation) Requires referral by treating physician for patient with diabetes or renal disease. 10 hours of initial DSMT session of no less than 30 minutes each in a continuous 12-month period. 2 hours of follow-up DSMT in subsequent years.  N/a     Glaucoma Screening (no USPSTF recommendation) Diabetes mellitus, family history, , age 48 or over,  American, age 72 or over Due     Human Immunodeficiency Virus (HIV) Screening (annually for increased risk patients)  HIV-1 and HIV-2 by EIA, JACKIE, rapid antibody test, or oral mucosa transudate Patient must be at increased risk for HIV infection per USPSTF guidelines or pregnant. Tests covered annually for patients at increased risk. Pregnant patients may receive up to 3 test during pregnancy. N/a     Medical Nutrition Therapy (MNT) (for diabetes or renal disease not recommended schedule) Requires referral by treating physician for patient with diabetes or renal disease. Can be provided in same year as diabetes self-management training (DSMT), and CMS recommends medical nutrition therapy take place after DSMT. Up to 3 hours for initial year and 2 hours in subsequent years. N/a     Shingles Vaccination A shingles vaccine is also recommended once in a lifetime after age 61 n/a    Seasonal Influenza Vaccination (annually)  utd     Pneumococcal Vaccination (once after 72)  UTD     Hepatitis B Vaccinations (if medium/high risk) Medium/high risk factors:  End-stage renal disease,  Hemophiliacs who received Factor VIII or IX concentrates, Clients of institutions for the mentally retarded, Persons who live in the same house as a HepB virus carrier, Homosexual men, Illicit injectable drug abusers. N/A     Screening Mammography (biennial age 54-69) Annually (age 36 or over) DUE    Screening Pap Tests and Pelvic Examination (up to age 79 and after 79 if unknown history or abnormal study last 10 years) Every 25 months except high risk UTD     Ultrasound Screening for Abdominal Aortic Aneurysm (AAA) (once) Patient must be referred through [facility] and not have had a screening for abdominal aortic aneurysm before under Medicare.   Limited to patients who meet one of the following criteria:  - Men who are 73-68 years old and have smoked more than 100 cigarettes in their lifetime.  -Anyone with a FH of AAA  -Anyone recommended for screening by USPSTF N/A

## 2018-04-19 NOTE — ACP (ADVANCE CARE PLANNING)
Advance Care Planning (ACP) Provider Note - Comprehensive     Date of ACP Conversation: 04/19/18  Persons included in Conversation:  patient  Length of ACP Conversation in minutes:  16 minutes    Authorized Decision Maker (if patient is incapable of making informed decisions): This person is:  Patient states that her son would be her authorized decision maker. She would like everything done possible. She is aware that she can change this at any time. She will fill out paperwork given and bring this in for scanning into her EMR chart. General ACP for ALL Patients with Decision Making Capacity:   Importance of advance care planning, including choosing a healthcare agent to communicate patient's healthcare decisions if patient lost the ability to make decisions, such as after a sudden illness or accident  Understanding of the healthcare agent role was assessed and information provided  Exploration of values, goals, and preferences if recovery is not expected, even with continued medical treatment in the event of: Imminent death  Severe, permanent brain injury  \"In these circumstances, what matters most to you? \"  Care focused more on comfort or quality of life.   Opportunity offered to explore how cultural, Jain, spiritual, or personal beliefs would affect decisions for future care     Interventions Provided:  Recommended completion of Advance Directive form after review of ACP materials and conversation with prospective healthcare agent      Carrillo Rendon MD

## 2018-04-19 NOTE — MR AVS SNAPSHOT
[facility] [address] [facility] 
[phone] Patient: Maria Gibbons MRN: [id number] Visit Information Date & Time Provider Department Dept. Phone Encounter #  
 4/19/2018  8:30 AM Carrillo Peres MD [facility] [phone] [id number] Follow-up Instructions Return in about 3 months (around 7/19/2018), or if symptoms worsen or fail to improve, for dm2, htn. Upcoming Health Maintenance Date Due FOBT Q 1 YEAR AGE 50-75 2/23/2018 MEDICARE YEARLY EXAM 3/26/2018 EYE EXAM RETINAL OR DILATED Q1 4/6/2018 HEMOGLOBIN A1C Q6M 6/21/2018 FOOT EXAM Q1 12/21/2018 MICROALBUMIN Q1 12/21/2018 LIPID PANEL Q1 12/21/2018 BREAST CANCER SCRN MAMMOGRAM 3/29/2020 PAP AKA CERVICAL CYTOLOGY 3/22/2021 DTaP/Tdap/Td series (2 - Td) 7/12/2026 Allergies as of 4/19/2018  Review Complete On: 4/19/2018 By: Rod Kat MD  
 No Known Allergies Current Immunizations  Reviewed on 11/15/2016 Name Date Influenza Vaccine (Quad) PF 12/21/2017, 10/12/2016 Pneumococcal Polysaccharide (PPSV-23) 10/12/2016 Tdap 7/12/2016 Not reviewed this visit You Were Diagnosed With   
  
 Codes Comments Type 2 diabetes mellitus without complication, without long-term current use of insulin (HCC)    -  Primary ICD-10-CM: E11.9 ICD-9-CM: 250.00 Tobacco use disorder     ICD-10-CM: F17.200 ICD-9-CM: 305.1 Essential hypertension     ICD-10-CM: I10 
ICD-9-CM: 401.9 Other hyperlipidemia     ICD-10-CM: E78.4 ICD-9-CM: 272.4 Encounter for Medicare annual wellness exam     ICD-10-CM: Z00.00 ICD-9-CM: V70.0 Midline low back pain without sciatica, unspecified chronicity     ICD-10-CM: M54.5 ICD-9-CM: 724.2 ACP (advance care planning)     ICD-10-CM: Z71.89 ICD-9-CM: V65.49 Vitals BP Pulse Temp Resp Height(growth percentile) Weight(growth percentile) 146/72 (BP 1 Location: Left arm, BP Patient Position: Sitting) 82 96.9 °F (36.1 °C) (Oral) 18 5' 6\" (1.676 m) 167 lb (75.8 kg) LMP SpO2 BMI OB Status Smoking Status 04/12/2018 98% 26.95 kg/m2 Having regular periods Current Every Day Smoker BMI and BSA Data Body Mass Index Body Surface Area  
 26.95 kg/m 2 1.88 m 2 Preferred Pharmacy Pharmacy Name Phone [facility] [address] [place] [address] AT [facility] [phone] Your Updated Medication List  
  
   
This list is accurate as of 4/19/18  9:01 AM.  Always use your most recent med list.  
  
  
  
  
 aspirin delayed-release 81 mg tablet Take 1 Tab by mouth daily. atorvastatin 80 mg tablet Commonly known as:  LIPITOR Take 1 Tab by mouth daily. losartan 100 mg tablet Commonly known as:  COZAAR Take 1 Tab by mouth daily. metFORMIN 1,000 mg tablet Commonly known as:  GLUCOPHAGE Take 1 Tab by mouth two (2) times daily (with meals). naproxen 500 mg tablet Commonly known as:  NAPROSYN Take 1 Tab by mouth two (2) times daily as needed. Prescriptions Sent to Pharmacy Refills  
 metFORMIN (GLUCOPHAGE) 1,000 mg tablet 1 Sig: Take 1 Tab by mouth two (2) times daily (with meals). Class: Normal  
 Pharmacy: [facility] [address] [place] [address] 78 Ph #: [phone] Route: Oral  
 losartan (COZAAR) 100 mg tablet 1 Sig: Take 1 Tab by mouth daily. Class: Normal  
 Pharm[facility]: [facility] [address] [place] [address] [facility] Ph #: [phone] Route: Oral  
 atorvastatin (LIPITOR) 80 mg tablet 1 Sig: Take 1 Tab by mouth daily.   
 Class: Normal  
 Pharmacy: [facility] [address] [place] [address], [address] [place] [address] [facility]: [phone] Route: Oral  
 aspirin delayed-release 81 mg tablet 1 Sig: Take 1 Tab by mouth daily. Class: Normal  
 Pharmacy: [facility] [address] [place] [address] [facility]: [phone] Route: Oral  
 naproxen (NAPROSYN) 500 mg tablet 2 Sig: Take 1 Tab by mouth two (2) times daily as needed. Class: Normal  
 Pharmacy: [facility] [place] [address] Ph #: [phone] Route: Oral  
  
We Performed the Following REFERRAL TO OPHTHALMOLOGY [REF57 Custom] Comments:  
 Diabetes eye exam  
  
Follow-up Instructions Return in about 3 months (around 7/19/2018), or if symptoms worsen or fail to improve, for dm2, htn. To-Do List   
 04/19/2018 Lab:  CBC WITH AUTOMATED DIFF   
  
 04/19/2018 Lab:  HEMOGLOBIN A1C WITH EAG   
  
 04/19/2018 Lab:  LIPID PANEL   
  
 04/19/2018 Lab:  METABOLIC PANEL, COMPREHENSIVE Referral Information Referral ID Referred By Referred To  
  
 [id number] Arvid Corporal N Not Available Visits Status Start Date End Date 1 New Request 4/19/18 4/19/19 If your referral has a status of pending review or denied, additional information will be sent to support the outcome of this decision. Patient Instructions Medicare Part B Preventive Services Limitations Recommendation Scheduled Bone Mass Measurement 
(age 72 & older, biennial) Requires diagnosis related to osteoporosis or estrogen deficiency. Biennial benefit unless patient has history of long-term glucocorticoid tx or baseline is needed because initial test was by other method N/a Cardiovascular Screening Blood Tests (every 5 years) Total cholesterol, HDL, Triglycerides Order as a panel if possible Due Colorectal Cancer Screening 
-Fecal occult blood test (annual) -Flexible sigmoidoscopy (5y) -Screening colonoscopy (10y) -Barium Enema  Due    
Counseling to Prevent Tobacco Use (up to 8 sessions per year) - Counseling greater than 3 and up to 10 minutes - Counseling greater than 10 minutes Patients must be asymptomatic of tobacco-related conditions to receive as preventive service Current smoker Diabetes Screening Tests (at least every 3 years, Medicare covers annually or at 6-month intervals for prediabetic patients) Fasting blood sugar (FBS) or glucose tolerance test (GTT) Patient must be diagnosed with one of the following: 
-Hypertension, Dyslipidemia, obesity, previous impaired FBS or GTT 
Or any two of the following: overweight, FH of diabetes, age ? 72, history of gestational diabetes, birth of baby weighing more than 9 pounds Due Diabetes Self-Management Training (DSMT) (no USPSTF recommendation) Requires referral by treating physician for patient with diabetes or renal disease. 10 hours of initial DSMT session of no less than 30 minutes each in a continuous 12-month period. 2 hours of follow-up DSMT in subsequent years. N/a Glaucoma Screening (no USPSTF recommendation) Diabetes mellitus, family history, , age 48 or over,  American, age 72 or over Due Human Immunodeficiency Virus (HIV) Screening (annually for increased risk patients) HIV-1 and HIV-2 by EIA, JACKIE, rapid antibody test, or oral mucosa transudate Patient must be at increased risk for HIV infection per USPSTF guidelines or pregnant. Tests covered annually for patients at increased risk. Pregnant patients may receive up to 3 test during pregnancy. N/a Medical Nutrition Therapy (MNT) (for diabetes or renal disease not recommended schedule) Requires referral by treating physician for patient with diabetes or renal disease.   Can be provided in same year as diabetes self-management training (DSMT), and CMS recommends medical nutrition therapy take place after DSMT. Up to 3 hours for initial year and 2 hours in subsequent years. N/a Shingles Vaccination A shingles vaccine is also recommended once in a lifetime after age 62 n/a Seasonal Influenza Vaccination (annually)  utd Pneumococcal Vaccination (once after 65)  UTD Hepatitis B Vaccinations (if medium/high risk) Medium/high risk factors:  End-stage renal disease, Hemophiliacs who received Factor VIII or IX concentrates, Clients of institutions for the mentally retarded, Persons who live in the same house as a HepB virus carrier, Homosexual men, Illicit injectable drug abusers. N/A Screening Mammography (biennial age 54-69) Annually (age 36 or over) DUE Screening Pap Tests and Pelvic Examination (up to age 79 and after 79 if unknown history or abnormal study last 10 years) Every 24 months except high risk UTD Ultrasound Screening for Abdominal Aortic Aneurysm (AAA) (once) Patient must be referred through [facility] and not have had a screening for abdominal aortic aneurysm before under Medicare. Limited to patients who meet one of the following criteria: 
- Men who are 73-68 years old and have smoked more than 100 cigarettes in their lifetime. 
-Anyone with a FH of AAA 
-Anyone recommended for screening by USPSTF N/A Introducing [facility]! Mihaela Haq introduces [employer] patient portal. Now you can access parts of your medical record, email your doctor's office, and request medication refills online. 1. In your internet browser, go to [email or web address] 2. Click on the First Time User? Click Here link in the Sign In box. You will see the New Member Sign Up page. 3. Enter your [employer] Access Code exactly as it appears below. You will not need to use this code after youve completed the sign-up process. If you do not sign up before the expiration date, you must request a new code. · [employer] Access Code:  [email or web address] 
 Expires: 6/20/2018  9:50 AM 
 
4. Enter the last four digits of your Social Security Number (xxxx) and Date of Birth (mm/dd/yyyy) as indicated and click Submit. You will be taken to the next sign-up page. 5. Create a [employer] ID. This will be your [employer] login ID and cannot be changed, so think of one that is secure and easy to remember. 6. Create a [employer] password. You can change your password at any time. 7. Enter your Password Reset Question and Answer. This can be used at a later time if you forget your password. 8. Enter your e-mail address. You will receive e-mail notification when new information is available in [address] 9. Click Sign Up. You can now view and download portions of your medical record. 10. Click the Download Summary menu link to download a portable copy of your medical information. If you have questions, please visit the Frequently Asked Questions section of the [employer] website. Remember, [employer] is NOT to be used for urgent needs. For medical emergencies, dial 911. Now available from your iPhone and Android! Please provide this summary of care documentation to your next provider. Your primary care clinician is listed as Carrillo Alex. If you have any questions after today's visit, please call [phone].

## 2018-04-19 NOTE — PROGRESS NOTES
HPI  Luiza Urena comes in for follow-up care. Diabetes mellitus type 2: Patient has type 2 diabetes mellitus. States that her blood glucose numbers have been stable. He does not have a blood glucose log but states that her numbers have mainly been between 120 and 130. We will check labs today. I will do an HbA1c. Will also do a CBC and a CMP. I will follow-up with her once I get the results. In the meantime she is on metformin. Hypertension: Patient has hypertension. She takes losartan. Has been out of the medication for a day. Would like refill of medication. Prescription was given. Low back pain: Patient has chronic low back pain. She does not do any exertion and denies any trauma to her back in recent times. She takes Aleve for pain as needed. Would like a refill of medication. Tobacco abuse: Patient continues to smoke. We have discussed this. I have strongly advised to quit smoking. She does understand that was effects of smoking. She is not ready to quit yet. I have discussed nicotine replacement therapy and medications to help her quit smoking. She will let me know when she feels she is ready for this. Dyslipidemia: Patient takes Lipitor daily. She has run out of medication. Would like a refill of the same. Past Medical History  Past Medical History:   Diagnosis Date    Diabetes ([facility] [address]     Hyperlipidemia     Hypertension     Stroke [facility])     2012       Surgical History  No past surgical history on file. Medications  Current Outpatient Prescriptions   Medication Sig Dispense Refill    metFORMIN (GLUCOPHAGE) 1,000 mg tablet Take 1 Tab by mouth two (2) times daily (with meals). 180 Tab 1    losartan (COZAAR) 100 mg tablet Take 1 Tab by mouth daily. 90 Tab 1    atorvastatin (LIPITOR) 80 mg tablet Take 1 Tab by mouth daily. 90 Tab 1    aspirin delayed-release 81 mg tablet Take 1 Tab by mouth daily.  90 Tab 1    naproxen (NAPROSYN) 500 mg tablet Take 1 Tab by mouth two (2) times daily as needed. 60 Tab 2       Allergies  No Known Allergies    Family History  No family history on file. Social History  Social History     Social History    Marital status: SINGLE     Spouse name: N/A    Number of children: N/A    Years of education: N/A     Occupational History    Not on file.      Social History Main Topics    Smoking status: Current Every Day Smoker     Packs/day: 0.50     Types: Cigarettes    Smokeless tobacco: Never Used    Alcohol use 0.0 oz/week     0 Standard drinks or equivalent per week      Comment: occassionally     Drug use: No    Sexual activity: No     Other Topics Concern    Not on file     Social History Narrative       Review of Systems  Review of Systems - Negative except as noted above in the HPI    Vital Signs  Visit Vitals    /72 (BP 1 Location: Left arm, BP Patient Position: Sitting)    Pulse 82    Temp 96.9 °F (36.1 °C) (Oral)    Resp 18    Ht 5' 6\" (1.676 m)    Wt 167 lb (75.8 kg)    LMP 04/12/2018    SpO2 98%    BMI 26.95 kg/m2         Physical Exam  Physical Examination: General appearance - oriented to person, place, and time, acyanotic, in no respiratory distress and well hydrated  Mental status - alert, oriented to person, place, and time, affect appropriate to mood  Mouth - mucous membranes moist, pharynx normal without lesions  Neck - supple, no significant adenopathy  Lymphatics - no palpable lymphadenopathy, no hepatosplenomegaly  Chest - clear to auscultation, no wheezes, rales or rhonchi, symmetric air entry  Heart - normal rate and regular rhythm, S1 and S2 normal  Abdomen - no rebound tenderness noted  Back exam - limited range of motion  Neurological - motor and sensory grossly normal bilaterally  Musculoskeletal - osteoarthritic changes noted in both hands  Extremities - no pedal edema noted, intact peripheral pulses    Results  Results for orders placed or performed in visit on 03/22/18   PAP IG, CT-NG-TV, APTIMA HPV AND RFX 43/61,29([id number])   Result Value Ref Range    Diagnosis Comment     Specimen adequacy Comment     Clinician provided ICD10 Comment     Performed by: Comment     . Simone Kidd Note: Comment     Test methodology Comment     HPV APTIMA Negative Negative    Chlamydia, Nuc. Acid Amp Negative Negative    Gonococcus, Nuc. Acid Amp Negative Negative    Trich vag by BETZY Positive (A) Negative       ASSESSMENT and PLAN    ICD-10-CM ICD-9-CM    1. Type 2 diabetes mellitus without complication, without long-term current use of insulin (HCC) E11.9 250.00 REFERRAL TO OPHTHALMOLOGY      metFORMIN (GLUCOPHAGE) 1,000 mg tablet      aspirin delayed-release 81 mg tablet      CBC WITH AUTOMATED DIFF      HEMOGLOBIN A1C WITH EAG      METABOLIC PANEL, COMPREHENSIVE      CBC WITH AUTOMATED DIFF      HEMOGLOBIN A1C WITH EAG      METABOLIC PANEL, COMPREHENSIVE      COLLECTION VENOUS BLOOD,VENIPUNCTURE   2. Tobacco use disorder F17.200 305.1    3. Essential hypertension I10 401.9 losartan (COZAAR) 100 mg tablet      atorvastatin (LIPITOR) 80 mg tablet      METABOLIC PANEL, COMPREHENSIVE      METABOLIC PANEL, COMPREHENSIVE      COLLECTION VENOUS BLOOD,VENIPUNCTURE   4. Other hyperlipidemia E78.4 272.4 LIPID PANEL      LIPID PANEL      COLLECTION VENOUS BLOOD,VENIPUNCTURE   5. Encounter for Medicare annual wellness exam Z00.00 V70.0    6. Midline low back pain without sciatica, unspecified chronicity M54.5 724.2 naproxen (NAPROSYN) 500 mg tablet   7.  ACP (advance care planning) Z71.89 V65.49      lab results and schedule of future lab studies reviewed with patient  reviewed diet, exercise and weight control  very strongly urged to quit smoking to reduce cardiovascular risk  cardiovascular risk and specific lipid/LDL goals reviewed  reviewed medications and side effects in detail  specific diabetic recommendations: low cholesterol diet, weight control and daily exercise discussed, home glucose monitoring emphasized, all medications, side effects and compliance discussed carefully, annual eye examinations at Ophthalmology discussed, glycohemoglobin and other lab monitoring discussed and long term diabetic complications discussed    I have discussed the diagnosis with the patient and the intended plan of care as seen in the above orders. The patient has received an after-visit summary and questions were answered concerning future plans. I have discussed medication, side effects, and warnings with the patient in detail. The patient verbalized understanding and is in agreement with the plan of care. The patient will contact the office with any additional concerns.     Nat Eagle MD

## 2018-04-20 LAB
ALBUMIN SERPL-MCNC: 4.7 G/DL (ref 3.5–5.5)
ALBUMIN/GLOB SERPL: 1.7 {RATIO} (ref 1.2–2.2)
ALP SERPL-CCNC: 106 IU/L (ref 39–117)
ALT SERPL-CCNC: 17 IU/L (ref 0–32)
AST SERPL-CCNC: 16 IU/L (ref 0–40)
BASOPHILS # BLD AUTO: 0 X10E3/UL (ref 0–0.2)
BASOPHILS NFR BLD AUTO: 0 %
BILIRUB SERPL-MCNC: 0.2 MG/DL (ref 0–1.2)
BUN SERPL-MCNC: 9 MG/DL (ref 6–24)
BUN/CREAT SERPL: 17 (ref 9–23)
CALCIUM SERPL-MCNC: 10.1 MG/DL (ref 8.7–10.2)
CHLORIDE SERPL-SCNC: 99 MMOL/L (ref 96–106)
CHOLEST SERPL-MCNC: 119 MG/DL (ref 100–199)
CO2 SERPL-SCNC: 28 MMOL/L (ref 18–29)
CREAT SERPL-MCNC: 0.52 MG/DL (ref 0.57–1)
EOSINOPHIL # BLD AUTO: 0.1 X10E3/UL (ref 0–0.4)
EOSINOPHIL NFR BLD AUTO: 1 %
ERYTHROCYTE [DISTWIDTH] IN BLOOD BY AUTOMATED COUNT: 15.3 % (ref 12.3–15.4)
EST. AVERAGE GLUCOSE BLD GHB EST-MCNC: 177 MG/DL
GFR SERPLBLD CREATININE-BSD FMLA CKD-EPI: 110 ML/MIN/1.73
GFR SERPLBLD CREATININE-BSD FMLA CKD-EPI: 127 ML/MIN/1.73
GLOBULIN SER CALC-MCNC: 2.7 G/DL (ref 1.5–4.5)
GLUCOSE SERPL-MCNC: 181 MG/DL (ref 65–99)
HBA1C MFR BLD: 7.8 % (ref 4.8–5.6)
HCT VFR BLD AUTO: 37.6 % (ref 34–46.6)
HDLC SERPL-MCNC: 45 MG/DL
HGB BLD-MCNC: 11.9 G/DL (ref 11.1–15.9)
IMM GRANULOCYTES # BLD: 0 X10E3/UL (ref 0–0.1)
IMM GRANULOCYTES NFR BLD: 0 %
INTERPRETATION, 910389: NORMAL
LDLC SERPL CALC-MCNC: 62 MG/DL (ref 0–99)
LYMPHOCYTES # BLD AUTO: 4.2 X10E3/UL (ref 0.7–3.1)
LYMPHOCYTES NFR BLD AUTO: 37 %
Lab: NORMAL
MCH RBC QN AUTO: 25.5 PG (ref 26.6–33)
MCHC RBC AUTO-ENTMCNC: 31.6 G/DL (ref 31.5–35.7)
MCV RBC AUTO: 81 FL (ref 79–97)
MONOCYTES # BLD AUTO: 0.8 X10E3/UL (ref 0.1–0.9)
MONOCYTES NFR BLD AUTO: 7 %
NEUTROPHILS # BLD AUTO: 6.4 X10E3/UL (ref 1.4–7)
NEUTROPHILS NFR BLD AUTO: 55 %
PLATELET # BLD AUTO: 257 X10E3/UL (ref 150–379)
POTASSIUM SERPL-SCNC: 4.1 MMOL/L (ref 3.5–5.2)
PROT SERPL-MCNC: 7.4 G/DL (ref 6–8.5)
RBC # BLD AUTO: 4.67 X10E6/UL (ref 3.77–5.28)
SODIUM SERPL-SCNC: 140 MMOL/L (ref 134–144)
TRIGL SERPL-MCNC: 59 MG/DL (ref 0–149)
VLDLC SERPL CALC-MCNC: 12 MG/DL (ref 5–40)
WBC # BLD AUTO: 11.6 X10E3/UL (ref 3.4–10.8)

## 2018-04-20 NOTE — ACP (ADVANCE CARE PLANNING)
Advance Care Planning (ACP) Provider Note - Comprehensive     Date of ACP Conversation: 04/19/18  Persons included in Conversation:  patient  Length of ACP Conversation in minutes:  16 minutes    Authorized Decision Maker (if patient is incapable of making informed decisions): This person is:  Patient states that her son would be her authorized decision maker. She will sit and talk to him about this. She was given forms to fill out and bring for scanning back to the EMR chart. At the moment she would like everything done. General ACP for ALL Patients with Decision Making Capacity:   Importance of advance care planning, including choosing a healthcare agent to communicate patient's healthcare decisions if patient lost the ability to make decisions, such as after a sudden illness or accident  Understanding of the healthcare agent role was assessed and information provided  Exploration of values, goals, and preferences if recovery is not expected, even with continued medical treatment in the event of: Imminent death  Severe, permanent brain injury  \"In these circumstances, what matters most to you? \"  Care focused more on comfort or quality of life.   Opportunity offered to explore how cultural, Alevism, spiritual, or personal beliefs would affect decisions for future care     Interventions Provided:  Recommended completion of Advance Directive form after review of ACP materials and conversation with prospective healthcare agent      Carrillo Chandler MD

## 2018-04-21 DIAGNOSIS — Z12.11 SCREEN FOR COLON CANCER: ICD-10-CM

## 2018-04-21 LAB — HEMOCCULT STL QL IA: NEGATIVE

## 2018-04-23 DIAGNOSIS — D72.829 LEUKOCYTOSIS, UNSPECIFIED TYPE: ICD-10-CM

## 2018-04-23 DIAGNOSIS — E11.9 TYPE 2 DIABETES MELLITUS WITHOUT COMPLICATION, WITHOUT LONG-TERM CURRENT USE OF INSULIN (HCC): Primary | ICD-10-CM

## 2018-04-23 RX ORDER — GLIPIZIDE 5 MG/1
5 TABLET ORAL 2 TIMES DAILY
Qty: 60 TAB | Refills: 1 | Status: SHIPPED | OUTPATIENT
Start: 2018-04-23 | End: 2018-07-09 | Stop reason: SDUPTHER

## 2018-04-23 NOTE — PROGRESS NOTES
Please let patient know her HbA1c is up at 7.8. This indicates poor control of her blood glucose. She is on metformin 1000 mg twice a day. I will add glipizide to take 5 mg twice a day with meals. She should keep a blood glucose log and follow-up with me in a month. Her white blood cell count is elevated. It has been on the higher side the last 3 times we checked. Given this I will refer her to the hematologist for an opinion.   Iza Puentes MD

## 2018-04-24 NOTE — PROGRESS NOTES
Spoke with patient informed patient of negative results at this time.  Patient verbalized understanding at this time

## 2018-04-26 NOTE — PROGRESS NOTES
Spoke with patient at this time regarding lab results.  Patient verblaized understanding at this time

## 2018-05-08 DIAGNOSIS — Z12.39 SCREENING BREAST EXAMINATION: ICD-10-CM

## 2018-07-09 DIAGNOSIS — E11.9 TYPE 2 DIABETES MELLITUS WITHOUT COMPLICATION, WITHOUT LONG-TERM CURRENT USE OF INSULIN (HCC): ICD-10-CM

## 2018-07-09 DIAGNOSIS — M54.50 MIDLINE LOW BACK PAIN WITHOUT SCIATICA, UNSPECIFIED CHRONICITY: ICD-10-CM

## 2018-07-09 RX ORDER — NAPROXEN 500 MG/1
TABLET ORAL
Qty: 180 TAB | Refills: 2 | Status: SHIPPED | OUTPATIENT
Start: 2018-07-09 | End: 2018-07-19 | Stop reason: SDUPTHER

## 2018-07-09 RX ORDER — GLIPIZIDE 5 MG/1
TABLET ORAL
Qty: 180 TAB | Refills: 1 | Status: SHIPPED | OUTPATIENT
Start: 2018-07-09 | End: 2019-06-10 | Stop reason: SDUPTHER

## 2018-07-19 ENCOUNTER — OFFICE VISIT (OUTPATIENT)
Dept: FAMILY MEDICINE CLINIC | Age: 53
End: 2018-07-19

## 2018-07-19 VITALS
OXYGEN SATURATION: 98 % | DIASTOLIC BLOOD PRESSURE: 63 MMHG | HEIGHT: 66 IN | TEMPERATURE: 97.3 F | WEIGHT: 171 LBS | RESPIRATION RATE: 18 BRPM | SYSTOLIC BLOOD PRESSURE: 129 MMHG | HEART RATE: 71 BPM | BODY MASS INDEX: 27.48 KG/M2

## 2018-07-19 DIAGNOSIS — Z23 ENCOUNTER FOR IMMUNIZATION: ICD-10-CM

## 2018-07-19 DIAGNOSIS — I10 ESSENTIAL HYPERTENSION: ICD-10-CM

## 2018-07-19 DIAGNOSIS — F17.200 TOBACCO USE DISORDER: ICD-10-CM

## 2018-07-19 DIAGNOSIS — E78.49 OTHER HYPERLIPIDEMIA: ICD-10-CM

## 2018-07-19 DIAGNOSIS — E11.9 TYPE 2 DIABETES MELLITUS WITHOUT COMPLICATION, WITHOUT LONG-TERM CURRENT USE OF INSULIN (HCC): Primary | ICD-10-CM

## 2018-07-19 LAB — HBA1C MFR BLD HPLC: 6.8 %

## 2018-07-19 RX ORDER — ISOPROPYL ALCOHOL 70 ML/100ML
SWAB TOPICAL
Qty: 200 PAD | Refills: 3 | Status: SHIPPED | OUTPATIENT
Start: 2018-07-19 | End: 2018-10-22

## 2018-07-19 RX ORDER — METFORMIN HYDROCHLORIDE 1000 MG/1
1000 TABLET ORAL 2 TIMES DAILY WITH MEALS
Qty: 180 TAB | Refills: 1 | Status: SHIPPED | OUTPATIENT
Start: 2018-07-19 | End: 2018-10-22 | Stop reason: SDUPTHER

## 2018-07-19 NOTE — PROGRESS NOTES
SHOAIB StatonGarethcristina Quevedo comes in for follow-up care. Hypertension: Patient is on losartan. Blood pressure is stable. We will continue with the current treatment plan. Diabetes mellitus type 2: Patient states Metformin and glipizide. She has not been checking her blood glucose at home. I did emphasize the need to check her blood glucose at home. I will check HbA1c today. In the meantime will continue with the current treatment plan. Dyslipidemia: Patient is on atorvastatin. Taking the medication and tolerating. Plan to recheck lipid panel at next visit. Tobacco use disorder: Patient states that she has cut back on her smoking and this week has not had a cigarette. Encouraged to continue with efforts to quit the habit. He does not want any medication or nicotine replacement therapy. Health maintenance: Patient does needs to have Pneumovax 23 given. We will do this today. Past Medical History  Past Medical History:   Diagnosis Date    Diabetes ([facility] [address]     Hyperlipidemia     Hypertension     Stroke [facility])     2012       Surgical History  No past surgical history on file. Medications  Current Outpatient Prescriptions   Medication Sig Dispense Refill    metFORMIN (GLUCOPHAGE) 1,000 mg tablet Take 1 Tab by mouth two (2) times daily (with meals). 180 Tab 1    glipiZIDE (GLUCOTROL) 5 mg tablet TAKE 1 TABLET BY MOUTH TWICE DAILY 180 Tab 1    losartan (COZAAR) 100 mg tablet Take 1 Tab by mouth daily. 90 Tab 1    atorvastatin (LIPITOR) 80 mg tablet Take 1 Tab by mouth daily. 90 Tab 1    aspirin delayed-release 81 mg tablet Take 1 Tab by mouth daily. 90 Tab 1    naproxen (NAPROSYN) 500 mg tablet Take 1 Tab by mouth two (2) times daily as needed. 60 Tab 2       Allergies  No Known Allergies    Family History  No family history on file.     Social History  Social History     Social History    Marital status: SINGLE     Spouse name: N/A    Number of children: N/A    Years of education: N/A Occupational History    Not on file.      Social History Main Topics    Smoking status: Current Every Day Smoker     Packs/day: 0.50     Types: Cigarettes    Smokeless tobacco: Never Used    Alcohol use 0.0 oz/week     0 Standard drinks or equivalent per week      Comment: occassionally     Drug use: No    Sexual activity: No     Other Topics Concern    Not on file     Social History Narrative       Review of Systems  Review of Systems - History obtained from chart review and the patient  General ROS: negative for - chills, fatigue, fever or malaise  Psychological ROS: negative  Ophthalmic ROS: negative  ENT ROS: negative  Allergy and Immunology ROS: negative  Hematological and Lymphatic ROS: negative  Endocrine ROS: Diabetes mellitus type 2  Respiratory ROS: no cough, shortness of breath, or wheezing  Cardiovascular ROS: no chest pain or dyspnea on exertion  Gastrointestinal ROS: no abdominal pain, change in bowel habits, or black or bloody stools  Genito-Urinary ROS: negative  Musculoskeletal ROS: negative  Neurological ROS: no TIA or stroke symptoms    Vital Signs  Visit Vitals    /63 (BP 1 Location: Left arm, BP Patient Position: Sitting)    Pulse 71    Temp 97.3 °F (36.3 °C) (Oral)    Resp 18    Ht 5' 6\" (1.676 m)    Wt 171 lb (77.6 kg)    LMP 07/15/2018    SpO2 98%    BMI 27.6 kg/m2         Physical Exam  Physical Examination: General appearance - oriented to person, place, and time, acyanotic, in no respiratory distress and well hydrated  Mental status - alert, oriented to person, place, and time, affect appropriate to mood  Mouth - mucous membranes moist, pharynx normal without lesions  Neck - supple, no significant adenopathy  Lymphatics - no palpable lymphadenopathy  Chest - clear to auscultation, no wheezes, rales or rhonchi, symmetric air entry  Heart - normal rate and regular rhythm, S1 and S2 normal  Neurological - alert, oriented, normal speech, no focal findings or movement disorder noted  Musculoskeletal - no joint tenderness, deformity or swelling  Extremities - no pedal edema noted    Results  Results for orders placed or performed in visit on 04/19/18   CBC WITH AUTOMATED DIFF   Result Value Ref Range    WBC 11.6 (H) 3.4 - 10.8 x10E3/uL    RBC 4.67 3.77 - 5.28 x10E6/uL    HGB 11.9 11.1 - 15.9 g/dL    HCT 37.6 34.0 - 46.6 %    MCV 81 79 - 97 fL    MCH 25.5 (L) 26.6 - 33.0 pg    MCHC 31.6 31.5 - 35.7 g/dL    RDW 15.3 12.3 - 15.4 %    PLATELET 817 007 - 626 x10E3/uL    NEUTROPHILS 55 Not Estab. %    Lymphocytes 37 Not Estab. %    MONOCYTES 7 Not Estab. %    EOSINOPHILS 1 Not Estab. %    BASOPHILS 0 Not Estab. %    ABS. NEUTROPHILS 6.4 1.4 - 7.0 x10E3/uL    Abs Lymphocytes 4.2 (H) 0.7 - 3.1 x10E3/uL    ABS. MONOCYTES 0.8 0.1 - 0.9 x10E3/uL    ABS. EOSINOPHILS 0.1 0.0 - 0.4 x10E3/uL    ABS. BASOPHILS 0.0 0.0 - 0.2 x10E3/uL    IMMATURE GRANULOCYTES 0 Not Estab. %    ABS. IMM. GRANS. 0.0 0.0 - 0.1 x10E3/uL   HEMOGLOBIN A1C WITH EAG   Result Value Ref Range    Hemoglobin A1c 7.8 (H) 4.8 - 5.6 %    Estimated average glucose 177 mg/dL   LIPID PANEL   Result Value Ref Range    Cholesterol, total 119 100 - 199 mg/dL    Triglyceride 59 0 - 149 mg/dL    HDL Cholesterol 45 >39 mg/dL    VLDL, calculated 12 5 - 40 mg/dL    LDL, calculated 62 0 - 99 mg/dL   METABOLIC PANEL, COMPREHENSIVE   Result Value Ref Range    Glucose 181 (H) 65 - 99 mg/dL    BUN 9 6 - 24 mg/dL    Creatinine 0.52 (L) 0.57 - 1.00 mg/dL    GFR est non- >59 mL/min/1.73    GFR est  >59 mL/min/1.73    BUN/Creatinine ratio 17 9 - 23    Sodium 140 134 - 144 mmol/L    Potassium 4.1 3.5 - 5.2 mmol/L    Chloride 99 96 - 106 mmol/L    CO2 28 18 - 29 mmol/L    Calcium 10.1 8.7 - 10.2 mg/dL    Protein, total 7.4 6.0 - 8.5 g/dL    Albumin 4.7 3.5 - 5.5 g/dL    GLOBULIN, TOTAL 2.7 1.5 - 4.5 g/dL    A-G Ratio 1.7 1.2 - 2.2    Bilirubin, total 0.2 0.0 - 1.2 mg/dL    Alk.  phosphatase 106 39 - 117 IU/L    AST (SGOT) 16 0 - 40 IU/L ALT (SGPT) 17 0 - 32 IU/L   CVD REPORT   Result Value Ref Range    INTERPRETATION Note    DIABETES PATIENT EDUCATION   Result Value Ref Range    PDF Image Not applicable        ASSESSMENT and PLAN    ICD-10-CM ICD-9-CM    1. Type 2 diabetes mellitus without complication, without long-term current use of insulin (HCC) E11.9 250.00 AMB POC HEMOGLOBIN A1C      REFERRAL TO OPHTHALMOLOGY      metFORMIN (GLUCOPHAGE) 1,000 mg tablet      alcohol swabs (ALCOHOL PADS) padm   2. Tobacco use disorder F17.200 305.1    3. Essential hypertension I10 401.9    4. Other hyperlipidemia E78.4 272.4      lab results and schedule of future lab studies reviewed with patient  reviewed diet, exercise and weight control  very strongly urged to quit smoking to reduce cardiovascular risk  cardiovascular risk and specific lipid/LDL goals reviewed  reviewed medications and side effects in detail  specific diabetic recommendations: low cholesterol diet, weight control and daily exercise discussed, home glucose monitoring emphasized, all medications, side effects and compliance discussed carefully, annual eye examinations at Ophthalmology discussed, glycohemoglobin and other lab monitoring discussed and long term diabetic complications discussed  use of aspirin to prevent MI and TIA's discussed    I have discussed the diagnosis with the patient and the intended plan of care as seen in the above orders. The patient has received an after-visit summary and questions were answered concerning future plans. I have discussed medication, side effects, and warnings with the patient in detail. The patient verbalized understanding and is in agreement with the plan of care. The patient will contact the office with any additional concerns.     Eloy Flores MD

## 2018-07-19 NOTE — PROGRESS NOTES
Verbal order given for Pneumococcal  23 per . Carissa Skelton is a 46 y.o. female who presents for routine immunizations. She denies any symptoms , reactions or allergies that would exclude them from being immunized today. Risks and adverse reactions were discussed and the VIS was given to them. All questions were addressed. She was observed for 15 min post injection. There were no reactions observed.     Micheline Dumont LPN

## 2018-07-19 NOTE — MR AVS SNAPSHOT
[facility] [address] [facility] 
[phone] Patient: Rita Walker MRN: [id number] MMV:[id number] Visit Information Date & Time Provider Department Dept. Phone Encounter #  
 7/19/2018  8:30 AM Destinee Cisse Ace, MD [facility] [phone] [id number] Follow-up Instructions Return in about 3 months (around 10/19/2018), or if symptoms worsen or fail to improve, for dm2, htn. Your Appointments 7/27/2018 11:15 AM  
New Patient with Priscila Sunshine MD  
[address] [facility]) Appt Note: LEUKOPENIA/DR DESTINEE BUCKNER/RECORDS IN CC/AMALISA [phone]  
 [place] [phone]  
  
   
 [address] 4/19/2019  8:30 AM  
Office Visit with Toni Workman NP [facility] ([address]) Appt Note:   
 [address] [facility] [address] [facility]  
  
   
 [address] [facility] Upcoming Health Maintenance Date Due Pneumococcal 19-64 Highest Risk (2 of 3 - PCV13) 10/12/2017 EYE EXAM RETINAL OR DILATED Q1 4/6/2018 Influenza Age 5 to Adult 8/1/2018 HEMOGLOBIN A1C Q6M 10/19/2018 FOOT EXAM Q1 12/21/2018 MICROALBUMIN Q1 12/21/2018 LIPID PANEL Q1 4/19/2019 FOBT Q 1 YEAR AGE 50-75 4/19/2019 MEDICARE YEARLY EXAM 4/20/2019 BREAST CANCER SCRN MAMMOGRAM 3/29/2020 PAP AKA CERVICAL CYTOLOGY 3/22/2021 DTaP/Tdap/Td series (2 - Td) 7/12/2026 Allergies as of 7/19/2018  Review Complete On: 7/19/2018 By: Monica Small MD  
 No Known Allergies Current Immunizations  Reviewed on 11/15/2016 Name Date Influenza Vaccine (Quad) PF 12/21/2017, 10/12/2016 Pneumococcal Polysaccharide (PPSV-23) 10/12/2016 Tdap 7/12/2016 Not reviewed this visit You Were Diagnosed With   
  
 Codes Comments Type 2 diabetes mellitus without complication, without long-term current use of insulin (HCC)    -  Primary ICD-10-CM: E11.9 ICD-9-CM: 250.00 Tobacco use disorder     ICD-10-CM: F17.200 ICD-9-CM: 305.1 Essential hypertension     ICD-10-CM: I10 
ICD-9-CM: 401.9 Other hyperlipidemia     ICD-10-CM: E78.4 ICD-9-CM: 272.4 Vitals BP Pulse Temp Resp Height(growth percentile) Weight(growth percentile) 129/63 (BP 1 Location: Left arm, BP Patient Position: Sitting) 71 97.3 °F (36.3 °C) (Oral) 18 5' 6\" (1.676 m) 171 lb (77.6 kg) LMP SpO2 BMI OB Status Smoking Status 07/15/2018 98% 27.6 kg/m2 Having regular periods Current Every Day Smoker BMI and BSA Data Body Mass Index Body Surface Area  
 27.6 kg/m 2 1.9 m 2 Preferred Pharmacy Pharmacy Name Phone [facility] [address] [place] [address] AT [facility] [phone] Your Updated Medication List  
  
   
This list is accurate as of 7/19/18  8:42 AM.  Always use your most recent med list.  
  
  
  
  
 alcohol swabs Padm Commonly known as:  ALCOHOL PADS Clean skin then Check blood glucose 2 x day  
  
 aspirin delayed-release 81 mg tablet Take 1 Tab by mouth daily. atorvastatin 80 mg tablet Commonly known as:  LIPITOR Take 1 Tab by mouth daily. glipiZIDE 5 mg tablet Commonly known as:  GLUCOTROL  
TAKE 1 TABLET BY MOUTH TWICE DAILY losartan 100 mg tablet Commonly known as:  COZAAR Take 1 Tab by mouth daily. metFORMIN 1,000 mg tablet Commonly known as:  GLUCOPHAGE Take 1 Tab by mouth two (2) times daily (with meals). naproxen 500 mg tablet Commonly known as:  NAPROSYN Take 1 Tab by mouth two (2) times daily as needed. Prescriptions Sent to Pharmacy  Refills  
 metFORMIN (GLUCOPHAGE) 1,000 mg tablet 1  
 Sig: Take 1 Tab by mouth two (2) times daily (with meals). Class: Normal  
 Pharmacy: [facility] [address] [place] [address] [facility] Ph #: [phone] Route: Oral  
 alcohol swabs (ALCOHOL PADS) padm 3 Sig: Clean skin then Check blood glucose 2 x day Class: Normal  
 Pharmacy: [facility] [address] [place] [address] [facility] Ph #: [phone] We Performed the Following AMB POC HEMOGLOBIN A1C [30828 CPT(R)] REFERRAL TO OPHTHALMOLOGY [REF57 Custom] Follow-up Instructions Return in about 3 months (around 10/19/2018), or if symptoms worsen or fail to improve, for dm2, htn. Referral Information Referral ID Referred By Referred To  
  
 [id number] Anel Heath NILS Not Available Visits Status Start Date End Date 1 New Request 7/19/18 7/19/19 If your referral has a status of pending review or denied, additional information will be sent to support the outcome of this decision. Introducing [facility]! [employer] introduces [employer] [facility] patient portal. Now you can access parts of your medical record, email your doctor's office, and request medication refills online. 1. In your internet browser, go to [email or web address] 2. Click on the First Time User? Click Here link in the Sign In box. You will see the New Member Sign Up page. 3. Enter your [facility] Access Code exactly as it appears below. You will not need to use this code after youve completed the sign-up process. If you do not sign up before the expiration date, you must request a new code. · [facility] Access Code: [email or web address] Expires: 10/17/2018  8:40 AM 
 
4. Enter the last four digits of your Social Security Number (xxxx) and Date of Birth (mm/dd/yyyy) as indicated and click Submit. You will be taken to the next sign-up page. 5. Create a [employer] ID. This will be your [employer] login ID and cannot be changed, so think of one that is secure and easy to remember. 6. Create a [employer] password. You can change your password at any time. 7. Enter your Password Reset Question and Answer. This can be used at a later time if you forget your password. 8. Enter your e-mail address. You will receive e-mail notification when new information is available in [address] 9. Click Sign Up. You can now view and download portions of your medical record. 10. Click the Download Summary menu link to download a portable copy of your medical information. If you have questions, please visit the Frequently Asked Questions section of the [employer] website. Remember, [employer] is NOT to be used for urgent needs. For medical emergencies, dial 911. Now available from your iPhone and Android! Please provide this summary of care documentation to your next provider. Your primary care clinician is listed as Carrillo Alex. If you have any questions after today's visit, please call [phone].

## 2018-10-22 ENCOUNTER — OFFICE VISIT (OUTPATIENT)
Dept: FAMILY MEDICINE CLINIC | Age: 53
End: 2018-10-22

## 2018-10-22 VITALS
SYSTOLIC BLOOD PRESSURE: 148 MMHG | OXYGEN SATURATION: 99 % | DIASTOLIC BLOOD PRESSURE: 66 MMHG | TEMPERATURE: 98.1 F | WEIGHT: 169 LBS | HEART RATE: 79 BPM | HEIGHT: 66 IN | RESPIRATION RATE: 18 BRPM | BODY MASS INDEX: 27.16 KG/M2

## 2018-10-22 DIAGNOSIS — E11.9 TYPE 2 DIABETES MELLITUS WITHOUT COMPLICATION, WITHOUT LONG-TERM CURRENT USE OF INSULIN (HCC): Primary | ICD-10-CM

## 2018-10-22 DIAGNOSIS — F17.200 TOBACCO USE DISORDER: ICD-10-CM

## 2018-10-22 DIAGNOSIS — I10 ESSENTIAL HYPERTENSION: ICD-10-CM

## 2018-10-22 DIAGNOSIS — E78.49 OTHER HYPERLIPIDEMIA: ICD-10-CM

## 2018-10-22 DIAGNOSIS — Z23 ENCOUNTER FOR IMMUNIZATION: ICD-10-CM

## 2018-10-22 RX ORDER — METFORMIN HYDROCHLORIDE 1000 MG/1
1000 TABLET ORAL 2 TIMES DAILY WITH MEALS
Qty: 180 TAB | Refills: 1 | Status: SHIPPED | OUTPATIENT
Start: 2018-10-22 | End: 2019-06-10 | Stop reason: SDUPTHER

## 2018-10-22 NOTE — PROGRESS NOTES
Chief Complaint Patient presents with  Follow-up HTN and DM2  Medication Refill 1. Have you been to the ER, urgent care clinic since your last visit? Hospitalized since your last visit? No 
 
2. Have you seen or consulted any other health care providers outside of the [address] [place] [address] since your last visit? Include any pap smears or colon screening. No 
 
[facility] comes in for follow-up care. Patient has hypertension. Blood pressure has been stable. She takes losartan 100 mg daily. Would like a refill of medication. Her blood pressure slightly elevated today. He had run out of the medication. I did emphasize the need to be compliant with medication. Prescription was sent in. Patient also has diabetes mellitus type 2. Her HbA1c done last time was 6.8. She is on metformin and glipizide. Has been off metformin for about a month. I did emphasize need to be compliant with medication. He will do lifestyle and dietary modification in addition to this. She will continue with the current medication plan. Patient has dyslipidemia and is on atorvastatin 80 mg daily. She takes food that is low in poysaturated fats. We will continue with the current treatment plan. Patient will get the flu vaccine today. Patient has a BMI of 27.28. This is overweight. We discussed normal BMI. She will continue with lifestyle and dietary modification in an effort to bring this down. Patient has tobacco use disorder. She continues to smoke. We have discussed this in the past.  She is not ready to quit smoking but when she feels it is appropriate she will let us know. We discussed nicotine replacement therapy and medications to help with smoking cessation. Past Medical History Past Medical History:  
Diagnosis Date  Diabetes ([facility]  Hyperlipidemia  Hypertension  [facility] 2012 Surgical History History reviewed. No pertinent surgical history. Medications Current Outpatient Medications Medication Sig Dispense Refill  metFORMIN (GLUCOPHAGE) 1,000 mg tablet Take 1 Tab by mouth two (2) times daily (with meals). 180 Tab 1  
 alcohol swabs (ALCOHOL PADS) padm Clean skin then Check blood glucose 2 x day 200 Pad 3  
 glipiZIDE (GLUCOTROL) 5 mg tablet TAKE 1 TABLET BY MOUTH TWICE DAILY 180 Tab 1  
 losartan (COZAAR) 100 mg tablet Take 1 Tab by mouth daily. 90 Tab 1  
 atorvastatin (LIPITOR) 80 mg tablet Take 1 Tab by mouth daily. 90 Tab 1  
 aspirin delayed-release 81 mg tablet Take 1 Tab by mouth daily. 90 Tab 1 Allergies No Known Allergies Family History History reviewed. No pertinent family history. Social History Social History Socioeconomic History  Marital status: SINGLE Spouse name: Not on file  Number of children: Not on file  Years of education: Not on file  Highest education level: Not on file Social Needs  Financial resource strain: Not on file  Food insecurity - worry: Not on file  Food insecurity - inability: Not on file  Transportation needs - medical: Not on file  Transportation needs - non-medical: Not on file Occupational History  Not on file Tobacco Use  Smoking status: Current Every Day Smoker Packs/day: 0.50 Types: Cigarettes  Smokeless tobacco: Never Used Substance and Sexual Activity  Alcohol use: Yes Alcohol/week: 0.0 oz Frequency: Never Comment: occassionally  Drug use: No  
 Sexual activity: No  
Other Topics Concern  Not on file Social History Narrative  Not on file Review of Systems Review of Systems -review of all systems is negative except as noted above in the HPI. Vital Signs Visit Vitals /66 (BP 1 Location: Left arm, BP Patient Position: Sitting) Pulse 79 Temp 98.1 °F (36.7 °C) (Oral) Resp 18 Ht 5' 6\" (1.676 m) Wt 169 lb (76.7 kg) LMP 10/05/2018 (Approximate) SpO2 99% BMI 27.28 kg/m² Physical Exam 
Physical Examination: General appearance - alert, well appearing, and in no distress, oriented to person, place, and time, overweight and acyanotic, in no respiratory distress Mental status - alert, oriented to person, place, and time, affect appropriate to mood Mouth - mucous membranes moist, pharynx normal without lesions Neck - supple, no significant adenopathy Lymphatics - no palpable lymphadenopathy Chest - clear to auscultation, no wheezes, rales or rhonchi, symmetric air entry Heart - normal rate, regular rhythm, normal S1, S2, no murmurs, rubs, clicks or gallops Neurological - motor and sensory grossly normal bilaterally Musculoskeletal - full range of motion without pain Extremities - intact peripheral pulses Results Results for orders placed or performed in visit on 07/19/18 AMB POC HEMOGLOBIN A1C Result Value Ref Range Hemoglobin A1c (POC) 6.8 % ASSESSMENT and PLAN 
  ICD-10-CM ICD-9-CM 1. Type 2 diabetes mellitus without complication, without long-term current use of insulin (HCC) E11.9 250.00 metFORMIN (GLUCOPHAGE) 1,000 mg tablet REFERRAL TO OPHTHALMOLOGY 2. Tobacco use disorder F17.200 305.1 3. Essential hypertension I10 401.9 4. Other hyperlipidemia E78.49 272.4 5. Encounter for immunization Z23 V03.89 INFLUENZA VIRUS VAC QUAD,SPLIT,PRESV FREE SYRINGE IM  
   ADMIN INFLUENZA VIRUS VAC Discussed the patient's BMI with her.   The BMI follow up plan is as follows:  
 
dietary management education, guidance, and counseling 
encourage exercise 
monitor weight 
lab results and schedule of future lab studies reviewed with patient 
reviewed diet, exercise and weight control 
reviewed medications and side effects in detail 
specific diabetic recommendations: low cholesterol diet, weight control and daily exercise discussed, home glucose monitoring emphasized, all medications, side effects and compliance discussed carefully, annual eye examinations at Ophthalmology discussed, glycohemoglobin and other lab monitoring discussed and long term diabetic complications discussed 
use of aspirin to prevent MI and TIA's discussed I have discussed the diagnosis with the patient and the intended plan of care as seen in the above orders. The patient has received an after-visit summary and questions were answered concerning future plans. I have discussed medication, side effects, and warnings with the patient in detail. The patient verbalized understanding and is in agreement with the plan of care. The patient will contact the office with any additional concerns.  
 
Neftali Newsome MD

## 2018-10-22 NOTE — PROGRESS NOTES
Anisa Elaine is a 48 y.o. female who presents for routine immunizations. She denies any symptoms , reactions or allergies that would exclude them from being immunized today. Risks and adverse reactions were discussed and the VIS was given to them. All questions were addressed. She was observed for 15 min post injection. There were no reactions observed.  
 
Rohan Muir LPN

## 2018-10-22 NOTE — PATIENT INSTRUCTIONS
Body Mass Index: Care Instructions Your Care Instructions Body mass index (BMI) can help you see if your weight is raising your risk for health problems. It uses a formula to compare how much you weigh with how tall you are. · A BMI lower than 18.5 is considered underweight. · A BMI between 18.5 and 24.9 is considered healthy. · A BMI between 25 and 29.9 is considered overweight. A BMI of 30 or higher is considered obese. If your BMI is in the normal range, it means that you have a lower risk for weight-related health problems. If your BMI is in the overweight or obese range, you may be at increased risk for weight-related health problems, such as high blood pressure, heart disease, stroke, arthritis or joint pain, and diabetes. If your BMI is in the underweight range, you may be at increased risk for health problems such as fatigue, lower protection (immunity) against illness, muscle loss, bone loss, hair loss, and hormone problems. BMI is just one measure of your risk for weight-related health problems. You may be at higher risk for health problems if you are not active, you eat an unhealthy diet, or you drink too much alcohol or use tobacco products. Follow-up care is a key part of your treatment and safety. Be sure to make and go to all appointments, and call your doctor if you are having problems. It's also a good idea to know your test results and keep a list of the medicines you take. How can you care for yourself at home? · Practice healthy eating habits. This includes eating plenty of fruits, vegetables, whole grains, lean protein, and low-fat dairy. · If your doctor recommends it, get more exercise. Walking is a good choice. Bit by bit, increase the amount you walk every day. Try for at least 30 minutes on most days of the week. · Do not smoke. Smoking can increase your risk for health problems.  If you need help quitting, talk to your doctor about stop-smoking programs and medicines. These can increase your chances of quitting for good. · Limit alcohol to 2 drinks a day for men and 1 drink a day for women. Too much alcohol can cause health problems. If you have a BMI higher than 25 · Your doctor may do other tests to check your risk for weight-related health problems. This may include measuring the distance around your waist. A waist measurement of more than 40 inches in men or 35 inches in women can increase the risk of weight-related health problems. · Talk with your doctor about steps you can take to stay healthy or improve your health. You may need to make lifestyle changes to lose weight and stay healthy, such as changing your diet and getting regular exercise. If you have a BMI lower than 18.5 · Your doctor may do other tests to check your risk for health problems. · Talk with your doctor about steps you can take to stay healthy or improve your health. You may need to make lifestyle changes to gain or maintain weight and stay healthy, such as getting more healthy foods in your diet and doing exercises to build muscle. Where can you learn more? Go to http://kurt-lam.info/. Enter S176 in the search box to learn more about \"Body Mass Index: Care Instructions. \" Current as of: October 13, 2016 Content Version: 11.4 © 5974-4065 [employer]. Care instructions adapted under license by [employer] (which disclaims liability or warranty for this information). If you have questions about a medical condition or this instruction, always ask your healthcare professional. [address] any warranty or liability for your use of this information.

## 2019-02-25 DIAGNOSIS — I10 ESSENTIAL HYPERTENSION: ICD-10-CM

## 2019-02-26 DIAGNOSIS — E11.9 TYPE 2 DIABETES MELLITUS WITHOUT COMPLICATION, WITHOUT LONG-TERM CURRENT USE OF INSULIN (HCC): ICD-10-CM

## 2019-02-26 RX ORDER — GLIPIZIDE 5 MG/1
TABLET ORAL
Qty: 60 TAB | Refills: 0 | Status: SHIPPED | OUTPATIENT
Start: 2019-02-26 | End: 2019-12-05 | Stop reason: SDUPTHER

## 2019-03-04 RX ORDER — LOSARTAN POTASSIUM 100 MG/1
TABLET ORAL
Qty: 90 TAB | Refills: 0 | Status: SHIPPED | OUTPATIENT
Start: 2019-03-04 | End: 2020-10-07 | Stop reason: SDUPTHER

## 2019-06-10 DIAGNOSIS — E11.9 TYPE 2 DIABETES MELLITUS WITHOUT COMPLICATION, WITHOUT LONG-TERM CURRENT USE OF INSULIN (HCC): ICD-10-CM

## 2019-06-10 DIAGNOSIS — I10 ESSENTIAL HYPERTENSION: ICD-10-CM

## 2019-06-10 RX ORDER — LOSARTAN POTASSIUM 100 MG/1
100 TABLET ORAL DAILY
Qty: 90 TAB | Refills: 1 | Status: SHIPPED | OUTPATIENT
Start: 2019-06-10 | End: 2019-06-27 | Stop reason: SDUPTHER

## 2019-06-10 RX ORDER — ATORVASTATIN CALCIUM 80 MG/1
80 TABLET, FILM COATED ORAL DAILY
Qty: 90 TAB | Refills: 1 | Status: SHIPPED | OUTPATIENT
Start: 2019-06-10 | End: 2020-10-07 | Stop reason: SDUPTHER

## 2019-06-10 RX ORDER — GLIPIZIDE 5 MG/1
TABLET ORAL
Qty: 180 TAB | Refills: 1 | Status: SHIPPED | OUTPATIENT
Start: 2019-06-10 | End: 2019-06-27 | Stop reason: SDUPTHER

## 2019-06-10 RX ORDER — METFORMIN HYDROCHLORIDE 1000 MG/1
1000 TABLET ORAL 2 TIMES DAILY WITH MEALS
Qty: 180 TAB | Refills: 1 | Status: SHIPPED | OUTPATIENT
Start: 2019-06-10 | End: 2020-10-07 | Stop reason: SDUPTHER

## 2019-06-10 RX ORDER — ASPIRIN 81 MG/1
81 TABLET ORAL DAILY
Qty: 90 TAB | Refills: 1 | Status: SHIPPED | OUTPATIENT
Start: 2019-06-10 | End: 2020-10-07 | Stop reason: SDUPTHER

## 2019-06-10 NOTE — TELEPHONE ENCOUNTER
Requested Prescriptions     Pending Prescriptions Disp Refills    atorvastatin (LIPITOR) 80 mg tablet 90 Tab 1     Sig: Take 1 Tab by mouth daily.  metFORMIN (GLUCOPHAGE) 1,000 mg tablet 180 Tab 1     Sig: Take 1 Tab by mouth two (2) times daily (with meals).  glipiZIDE (GLUCOTROL) 5 mg tablet 180 Tab 1    losartan (COZAAR) 100 mg tablet 90 Tab 1     Sig: Take 1 Tab by mouth daily.  aspirin delayed-release 81 mg tablet 90 Tab 1     Sig: Take 1 Tab by mouth daily. Patient aware of 24-48 hour turn around.   LV - 7/19/18  NV - 6/27/19

## 2019-06-27 ENCOUNTER — OFFICE VISIT (OUTPATIENT)
Dept: FAMILY MEDICINE CLINIC | Age: 54
End: 2019-06-27

## 2019-06-27 VITALS
BODY MASS INDEX: 28.16 KG/M2 | HEART RATE: 75 BPM | HEIGHT: 65 IN | OXYGEN SATURATION: 99 % | TEMPERATURE: 98.4 F | SYSTOLIC BLOOD PRESSURE: 154 MMHG | WEIGHT: 169 LBS | DIASTOLIC BLOOD PRESSURE: 73 MMHG | RESPIRATION RATE: 16 BRPM

## 2019-06-27 DIAGNOSIS — I10 ESSENTIAL HYPERTENSION: ICD-10-CM

## 2019-06-27 DIAGNOSIS — F17.200 TOBACCO USE DISORDER: ICD-10-CM

## 2019-06-27 DIAGNOSIS — Z71.89 ACP (ADVANCE CARE PLANNING): ICD-10-CM

## 2019-06-27 DIAGNOSIS — E78.49 OTHER HYPERLIPIDEMIA: ICD-10-CM

## 2019-06-27 DIAGNOSIS — E11.9 COMPREHENSIVE DIABETIC FOOT EXAMINATION, TYPE 2 DM, ENCOUNTER FOR (HCC): ICD-10-CM

## 2019-06-27 DIAGNOSIS — Z00.00 ENCOUNTER FOR MEDICARE ANNUAL WELLNESS EXAM: ICD-10-CM

## 2019-06-27 DIAGNOSIS — Z12.11 SCREEN FOR COLON CANCER: ICD-10-CM

## 2019-06-27 DIAGNOSIS — E11.9 TYPE 2 DIABETES MELLITUS WITHOUT COMPLICATION, WITHOUT LONG-TERM CURRENT USE OF INSULIN (HCC): Primary | ICD-10-CM

## 2019-06-27 RX ORDER — AMLODIPINE BESYLATE 10 MG/1
10 TABLET ORAL DAILY
Qty: 30 TAB | Refills: 2 | Status: SHIPPED | OUTPATIENT
Start: 2019-06-27 | End: 2019-06-27 | Stop reason: SDUPTHER

## 2019-06-27 RX ORDER — LANCETS
EACH MISCELLANEOUS
Qty: 200 EACH | Refills: 3 | Status: SHIPPED | OUTPATIENT
Start: 2019-06-27 | End: 2020-10-07 | Stop reason: SDUPTHER

## 2019-06-27 RX ORDER — AMLODIPINE BESYLATE 10 MG/1
TABLET ORAL
Qty: 90 TAB | Refills: 2 | Status: SHIPPED | OUTPATIENT
Start: 2019-06-27 | End: 2021-02-18 | Stop reason: SDUPTHER

## 2019-06-27 NOTE — PROGRESS NOTES
Venipuncture to left forearm at this time. Patient tolerated well at this time. No other concerns noted at this time.  Labs ordered by PCP

## 2019-06-27 NOTE — PROGRESS NOTES
Chief Complaint   Patient presents with   [address] [place] [address] Annual Wellness Visit     1. Have you been to the ER, urgent care clinic since your last visit? Hospitalized since your last visit? No    2. Have you seen or consulted any other health care providers outside of the [address] [place] [address] since your last visit? Include any pap smears or colon screening. No  This is the Subsequent Medicare Annual Wellness Exam, performed 12 months or more after the Initial AWV or the last Subsequent AWV    I have reviewed the patient's medical history in detail and updated the computerized patient record. History   Rubén Millard comes in for medicare wellness exam.    Past Medical History:   Diagnosis Date    [facility] ([facility]     Hyperlipidemia     Hypertension     Stroke ([facility]     2012      No past surgical history on file. Current Outpatient Medications   Medication Sig Dispense Refill    atorvastatin (LIPITOR) 80 mg tablet Take 1 Tab by mouth daily. 90 Tab 1    metFORMIN (GLUCOPHAGE) 1,000 mg tablet Take 1 Tab by mouth two (2) times daily (with meals). 180 Tab 1    aspirin delayed-release 81 mg tablet Take 1 Tab by mouth daily. 90 Tab 1    losartan (COZAAR) 100 mg tablet TAKE 1 TABLET BY MOUTH DAILY 90 Tab 0    glipiZIDE (GLUCOTROL) 5 mg tablet TAKE 1 TABLET BY MOUTH TWICE DAILY 60 Tab 0     No Known Allergies  No family history on file. Social History     Tobacco Use    Smoking status: Current Every Day Smoker     Packs/day: 0.50     Types: Cigarettes    Smokeless tobacco: Never Used   Substance Use Topics    Alcohol use:  Yes     Alcohol/week: 0.0 oz     Frequency: Never     Comment: occassionally      Patient Active Problem List   Diagnosis Code    Midline low back pain without sciatica M54.5    Tobacco use disorder F17.200    History of CVA (cerebrovascular accident) Z80.78    Essential hypertension I10    Hyperlipidemia E78.5    Type 2 diabetes mellitus without complication (HCC) S27.2    Type 2 diabetes mellitus without complication, without long-term current use of insulin (HCC) E11.9    ACP (advance care planning) Z71.89       Depression Risk Factor Screening:     3 most recent PHQ Screens 6/27/2019   Little interest or pleasure in doing things Not at all   Feeling down, depressed, irritable, or hopeless Not at all   Total Score PHQ 2 0   Trouble falling or staying asleep, or sleeping too much -   Feeling tired or having little energy -   Poor appetite, weight loss, or overeating -   Feeling bad about yourself - or that you are a failure or have let yourself or your family down -   Trouble concentrating on things such as school, work, reading, or watching TV -   Moving or speaking so slowly that other people could have noticed; or the opposite being so fidgety that others notice -   Thoughts of being better off dead, or hurting yourself in some way -   PHQ 9 Score -   How difficult have these problems made it for you to do your work, take care of your home and get along with others -     Alcohol Risk Factor Screening: You do not drink alcohol or very rarely. Functional Ability and Level of Safety:   .1. Was the patient's timed Up and GO test unsteady or longer than 30 seconds? Yes  2. Does the patient need help with the phone, transportation, shopping, preparing meals, housework, laundry, medications or managing money? No  3. Does the patients' home have rugs in the hallway, lack grab bars in the bathroom, lack handrails on the stairs or have poor lighting? No  4. Have you noticed any hearing difficulties? No  Hearing Evaluation:    Hearing Loss  Hearing is good. Activities of Daily Living  The home contains: no safety equipment. Patient does total self care    Fall Risk  Fall Risk Assessment, last 12 mths 2/23/2017   Able to walk? Yes   Fall in past 12 months?  No       Abuse Screen  Patient is not abused    Cognitive Screening   Evaluation of Cognitive Function:  Has your family/caregiver stated any concerns about your memory: no  Normal    Patient Care Team   Patient Care Team:  Ashok Li MD as PCP - General (Family Practice)  Jassi Dalton MD (Family Practice)    Assessment/Plan   Education and counseling provided:  End-of-Life planning (with patient's consent)  Cardiovascular screening blood test  Diabetes screening test    1. Encounter for Medicare annual wellness exam    2. ACP (advance care planning)    3. Screen for colon cancer  - OCCULT BLOOD IMMUNOASSAY,DIAGNOSTIC; Future    Health Maintenance Due   Topic Date Due    Shingrix Vaccine Age 50> (1 of 2) 09/20/2015    FOOT EXAM Q1  12/21/2018    MICROALBUMIN Q1  12/21/2018    HEMOGLOBIN A1C Q6M  01/19/2019    EYE EXAM RETINAL OR DILATED  04/06/2019    LIPID PANEL Q1  04/19/2019    FOBT Q 1 YEAR AGE 50-75  04/19/2019    MEDICARE YEARLY EXAM  04/20/2019     I have discussed the diagnosis with the patient and the intended plan of care as seen in the above orders. The patient has received an after-visit summary and questions were answered concerning future plans. I have discussed medication, side effects, and warnings with the patient in detail. The patient verbalized understanding and is in agreement with the plan of care. The patient will contact the office with any additional concerns. Personalized preventative plan of care was discussed, printed and given to patient.     Angy Maddox MD

## 2019-06-27 NOTE — PROGRESS NOTES
HPI  Amalia Paz comes in for f/u care. HTN: BP is elevated, she is on losartan. Denies headache, changes in vision or focal weakness. Will add amlodipine. Recheck BP in 2 weeks. DM2: Has not been checking blood glucose numbers as she has run out of test strips. Needs refill of these. She is on metformin and glipizide. Dyslipidemia: on atorvastatin, this causes change in taste sensation. Discussed the importance of taking the medication given she has diabetes. Tobacco abuse disorder: strongly advised to quit, she is not ready yet. Overweight: BMI is 28.12, she will intensify lifestyle and dietary modification. Past Medical History  Past Medical History:   Diagnosis Date    Diabetes ([facility]     Hyperlipidemia     Hypertension     Stroke [facility])     2012       Surgical History  History reviewed. No pertinent surgical history. Medications  Current Outpatient Medications   Medication Sig Dispense Refill    atorvastatin (LIPITOR) 80 mg tablet Take 1 Tab by mouth daily. 90 Tab 1    metFORMIN (GLUCOPHAGE) 1,000 mg tablet Take 1 Tab by mouth two (2) times daily (with meals). 180 Tab 1    aspirin delayed-release 81 mg tablet Take 1 Tab by mouth daily. 90 Tab 1    losartan (COZAAR) 100 mg tablet TAKE 1 TABLET BY MOUTH DAILY 90 Tab 0    glipiZIDE (GLUCOTROL) 5 mg tablet TAKE 1 TABLET BY MOUTH TWICE DAILY 60 Tab 0       Allergies  No Known Allergies    Family History  History reviewed. No pertinent family history.     Social History  Social History     Socioeconomic History    Marital status: SINGLE     Spouse name: Not on file    Number of children: Not on file    Years of education: Not on file    Highest education level: Not on file   Occupational History    Not on file   Social Needs    Financial resource strain: Not on file    Food insecurity:     Worry: Not on file     Inability: Not on file    Transportation needs:     Medical: Not on file     Non-medical: Not on file   Tobacco Use    Smoking status: Current Every Day Smoker     Packs/day: 0.50     Types: Cigarettes    Smokeless tobacco: Never Used   Substance and Sexual Activity    Alcohol use: Yes     Alcohol/week: 0.0 oz     Frequency: Never     Comment: occassionally     Drug use: No    Sexual activity: Never   Lifestyle    Physical activity:     Days per week: Not on file     Minutes per session: Not on file    Stress: Not on file   Relationships    Social connections:     Talks on phone: Not on file     Gets together: Not on file     Attends [employer] service: Not on file     Active member of club or organization: Not on file     Attends meetings of clubs or organizations: Not on file     Relationship status: Not on file    Intimate partner violence:     Fear of current or ex partner: Not on file     Emotionally abused: Not on file     Physically abused: Not on file     Forced sexual activity: Not on file   Other Topics Concern    Not on file   Social History Narrative    Not on file       Review of Systems  Review of Systems - Review of all systems is negative except as noted above in the HPI.     Vital Signs  Visit Vitals  /73 (BP 1 Location: Left arm, BP Patient Position: Sitting)   Pulse 75   Temp 98.4 °F (36.9 °C) (Oral)   Resp 16   Ht 5' 5\" (1.651 m)   Wt 169 lb (76.7 kg)   LMP 06/01/2019   SpO2 99%   BMI 28.12 kg/m²         Physical Exam  Physical Examination: General appearance - oriented to person, place, and time, overweight, acyanotic, in no respiratory distress and well hydrated  Mental status - alert, oriented to person, place, and time, affect appropriate to mood  Nose - normal and patent, no erythema, discharge or polyps  Mouth - mucous membranes moist, pharynx normal without lesions  Neck - supple, no significant adenopathy  Lymphatics - no palpable lymphadenopathy, no hepatosplenomegaly  Chest - clear to auscultation, no wheezes, rales or rhonchi, symmetric air entry  Heart - S1 and S2 normal  Back exam - limited range of motion  Neurological - alert, oriented, normal speech, no focal findings or movement disorder noted  Musculoskeletal - no muscular tenderness noted  Extremities - no pedal edema noted, intact peripheral pulses    Diabetic foot exam:     Left Foot:   Visual Exam: normal    Pulse DP: 2+ (normal)   Filament test: normal sensation        Right Foot:   Visual Exam: normal    Pulse DP: 2+ (normal)   Filament test: normal sensation      Results  Results for orders placed or performed in visit on 07/19/18   AMB POC HEMOGLOBIN A1C   Result Value Ref Range    Hemoglobin A1c (POC) 6.8 %       ASSESSMENT and PLAN      ICD-10-CM ICD-9-CM    1. Type 2 diabetes mellitus without complication, without long-term current use of insulin ([facility]) E11.9 250.00 CBC WITH AUTOMATED DIFF      METABOLIC PANEL, COMPREHENSIVE      HEMOGLOBIN A1C WITH EAG      AMB POC URINE, MICROALBUMIN, SEMIQUANTITATIVE      REFERRAL TO OPHTHALMOLOGY      glucose blood VI test strips (ASCENSIA AUTODISC VI, ONE TOUCH ULTRA TEST VI) strip      lancets misc   2. Comprehensive diabetic foot examination, type 2 DM, encounter for ([facility] E11.9 250.00  DIABETES FOOT EXAM   3. Essential hypertension I10 401.9 amLODIPine (NORVASC) 10 mg tablet   4. Other hyperlipidemia E78.49 272.4 LIPID PANEL   5. Tobacco use disorder F17.200 305.1    6. Encounter for Medicare annual wellness exam Z00.00 V70.0    7. ACP (advance care planning) Z71.89 V65.49    8. Screen for colon cancer Z12.11 V76.51 OCCULT BLOOD IMMUNOASSAY,DIAGNOSTIC   Discussed the patient's BMI with her.   The BMI follow up plan is as follows:     dietary management education, guidance, and counseling  encourage exercise  monitor weight  lab results and schedule of future lab studies reviewed with patient  reviewed diet, exercise and weight control  very strongly urged to quit smoking to reduce cardiovascular risk  cardiovascular risk and specific lipid/LDL goals reviewed  reviewed medications and side effects in detail  specific diabetic recommendations: low cholesterol diet, weight control and daily exercise discussed, home glucose monitoring emphasized, all medications, side effects and compliance discussed carefully, foot care discussed and Podiatry visits discussed, annual eye examinations at Ophthalmology discussed, glycohemoglobin and other lab monitoring discussed and long term diabetic complications discussed  use of aspirin to prevent MI and TIA's discussed      I have discussed the diagnosis with the patient and the intended plan of care as seen in the above orders. The patient has received an after-visit summary and questions were answered concerning future plans. I have discussed medication, side effects, and warnings with the patient in detail. The patient verbalized understanding and is in agreement with the plan of care. The patient will contact the office with any additional concerns. Heydi Keyes MD    PLEASE NOTE:   This document has been produced using voice recognition software.  Unrecognized errors in transcription may be present

## 2019-06-27 NOTE — ACP (ADVANCE CARE PLANNING)
Advance Care Planning (ACP) Provider Note - Comprehensive     Date of ACP Conversation: 06/27/19  Persons included in Conversation:  patient  Length of ACP Conversation in minutes:  16 minutes    Authorized Decision Maker (if patient is incapable of making informed decisions): This person is:  son. she will fill out paperwork given and bring this back in for scanning into the EMR. General ACP for ALL Patients with Decision Making Capacity:   Importance of advance care planning, including choosing a healthcare agent to communicate patient's healthcare decisions if patient lost the ability to make decisions, such as after a sudden illness or accident  Understanding of the healthcare agent role was assessed and information provided  Exploration of values, goals, and preferences if recovery is not expected, even with continued medical treatment in the event of: Imminent death  Severe, permanent brain injury  \"In these circumstances, what matters most to you? \"  Care focused more on comfort or quality of life.   Opportunity offered to explore how cultural, Judaism, spiritual, or personal beliefs would affect decisions for future care     Interventions Provided:  Recommended completion of Advance Directive form after review of ACP materials and conversation with prospective healthcare agent      Carrillo Archer MD

## 2019-06-27 NOTE — PATIENT INSTRUCTIONS
Medicare Part B Preventive Services Limitations Recommendation Scheduled Bone Mass Measurement 
(age 72 & older, biennial) A bone mass density test is recommended when a woman turns 65 to screen for osteoporosis. This test is only recommended one time, as a screening. Some providers will use this same test as a disease monitoring tool if you already have osteoporosis. N/a Cardiovascular Screening Blood Tests (every 5 years) Total cholesterol, HDL, Triglycerides and ECG Order blood work  as a panel if possible and adults with routine risk  an electrocardiogram (ECG) at intervals determined by your doctor. Due    
Colorectal Cancer Screening 
-Fecal occult blood test (annual) -Flexible sigmoidoscopy (5y) 
-Screening colonoscopy (10y) -Barium Enema Colorectal cancer screenings should be done for adults age 54-65 with no increased risk factors for colorectal cancer. There are a number of acceptable methods of screening for this type of cancer. Each test has its own benefits and drawbacks. Discuss with your doctor what is most appropriate for you during your annual wellness visit. The different tests include: colonoscopy (considered the best screening method), a fecal occult blood test, a fecal DNA test, and sigmoidoscopy. Due    
Counseling to Prevent Tobacco Use (up to 8 sessions per year) - Counseling greater than 3 and up to 10 minutes - Counseling greater than 10 minutes Patients must be asymptomatic of tobacco-related conditions to receive as preventive service Every day smoker Diabetes Screening Tests (at least every 3 years, Medicare covers annually or at 6-month intervals for prediabetic patients) Fasting blood sugar (FBS) or glucose tolerance test (GTT) -All adults age 38-68 who are overweight should have a diabetes screening test once every three years.  
-Other screening tests and preventive services for persons with diabetes include: an eye exam to screen for diabetic retinopathy, a kidney function test, a foot exam, and stricter control over your cholesterol. Due    
Diabetes Self-Management Training (DSMT) (no USPSTF recommendation) Requires referral by treating physician for patient with diabetes or renal disease. 10 hours of initial DSMT session of no less than 30 minutes each in a continuous 12-month period. 2 hours of follow-up DSMT in subsequent years. N/a Glaucoma Screening (no USPSTF recommendation) Diabetes mellitus, family history, , age 48 or over,  American, age 72 or over Due Human Immunodeficiency Virus (HIV) Screening (annually for increased risk patients) HIV-1 and HIV-2 by EIA, JACKIE, rapid antibody test, or oral mucosa transudate Patient must be at increased risk for HIV infection per USPSTF guidelines or pregnant. Tests covered annually for patients at increased risk. Pregnant patients may receive up to 3 test during pregnancy. N/a Medical Nutrition Therapy (MNT) (for diabetes or renal disease not recommended schedule) Requires referral by treating physician for patient with diabetes or renal disease. Can be provided in same year as diabetes self-management training (DSMT), and CMS recommends medical nutrition therapy take place after DSMT. Up to 3 hours for initial year and 2 hours in subsequent years. N/a Shingles Vaccination A shingles vaccine is also recommended once in a lifetime after age 61 Due Seasonal Influenza Vaccination (annually) All adults should have a flu vaccine yearly  utd Pneumococcal Vaccination (once after 72) All adults over the age of 72 should receive the recommended pneumonia vaccines. Current USPSTF guidelines recommend a series of two vaccines for the best pneumonia protection. utd Hepatitis B Vaccinations (if medium/high risk) Medium/high risk factors:  End-stage renal disease, 
 Hemophiliacs who received Factor VIII or IX concentrates, Clients of institutions for the mentally retarded, Persons who live in the same house as a HepB virus carrier, Homosexual men, Illicit injectable drug abusers. N/a Screening Mammography (biennial age 54-69) Breast cancer screenings are recommended every other year for women of normal risk, age 54-69. utd Screening Pap Tests and Pelvic Examination (up to age 79 and after 79 if unknown history or abnormal study last 10 years) Cervical cancer screenings for women over age 72 are only recommended with certain risk factors utd Hepatitis C All adults born between 80 and 1965 should be screened once  N/a Tetanus  All adults should have a tetanus vaccine every 10 years utd Body Mass Index: Care Instructions Your Care Instructions Body mass index (BMI) can help you see if your weight is raising your risk for health problems. It uses a formula to compare how much you weigh with how tall you are. · A BMI lower than 18.5 is considered underweight. · A BMI between 18.5 and 24.9 is considered healthy. · A BMI between 25 and 29.9 is considered overweight. A BMI of 30 or higher is considered obese. If your BMI is in the normal range, it means that you have a lower risk for weight-related health problems. If your BMI is in the overweight or obese range, you may be at increased risk for weight-related health problems, such as high blood pressure, heart disease, stroke, arthritis or joint pain, and diabetes. If your BMI is in the underweight range, you may be at increased risk for health problems such as fatigue, lower protection (immunity) against illness, muscle loss, bone loss, hair loss, and hormone problems. BMI is just one measure of your risk for weight-related health problems.  You may be at higher risk for health problems if you are not active, you eat an unhealthy diet, or you drink too much alcohol or use tobacco products. Follow-up care is a key part of your treatment and safety. Be sure to make and go to all appointments, and call your doctor if you are having problems. It's also a good idea to know your test results and keep a list of the medicines you take. How can you care for yourself at home? · Practice healthy eating habits. This includes eating plenty of fruits, vegetables, whole grains, lean protein, and low-fat dairy. · If your doctor recommends it, get more exercise. Walking is a good choice. Bit by bit, increase the amount you walk every day. Try for at least 30 minutes on most days of the week. · Do not smoke. Smoking can increase your risk for health problems. If you need help quitting, talk to your doctor about stop-smoking programs and medicines. These can increase your chances of quitting for good. · Limit alcohol to 2 drinks a day for men and 1 drink a day for women. Too much alcohol can cause health problems. If you have a BMI higher than 25 · Your doctor may do other tests to check your risk for weight-related health problems. This may include measuring the distance around your waist. A waist measurement of more than 40 inches in men or 35 inches in women can increase the risk of weight-related health problems. · Talk with your doctor about steps you can take to stay healthy or improve your health. You may need to make lifestyle changes to lose weight and stay healthy, such as changing your diet and getting regular exercise. If you have a BMI lower than 18.5 · Your doctor may do other tests to check your risk for health problems. · Talk with your doctor about steps you can take to stay healthy or improve your health. You may need to make lifestyle changes to gain or maintain weight and stay healthy, such as getting more healthy foods in your diet and doing exercises to build muscle. Where can you learn more? Go to http://kurt-lam.info/. Enter S176 in the search box to learn more about \"Body Mass Index: Care Instructions. \" Current as of: October 13, 2016 Content Version: 11.4 © 0164-3564 [employer]wise, [employer]. Care instructions adapted under license by [employer] (which disclaims liability or warranty for this information). If you have questions about a medical condition or this instruction, always ask your healthcare professional. [place] [address] any warranty or liability for your use of this information.

## 2019-06-28 LAB
ALBUMIN SERPL-MCNC: 4.1 G/DL (ref 3.5–5.5)
ALBUMIN/GLOB SERPL: 1.4 {RATIO} (ref 1.2–2.2)
ALP SERPL-CCNC: 113 IU/L (ref 39–117)
ALT SERPL-CCNC: 10 IU/L (ref 0–32)
AST SERPL-CCNC: 13 IU/L (ref 0–40)
BASOPHILS # BLD AUTO: 0 X10E3/UL (ref 0–0.2)
BASOPHILS NFR BLD AUTO: 0 %
BILIRUB SERPL-MCNC: 0.5 MG/DL (ref 0–1.2)
BUN SERPL-MCNC: 4 MG/DL (ref 6–24)
BUN/CREAT SERPL: 7 (ref 9–23)
CALCIUM SERPL-MCNC: 9.6 MG/DL (ref 8.7–10.2)
CHLORIDE SERPL-SCNC: 105 MMOL/L (ref 96–106)
CHOLEST SERPL-MCNC: 104 MG/DL (ref 100–199)
CO2 SERPL-SCNC: 26 MMOL/L (ref 20–29)
CREAT SERPL-MCNC: 0.56 MG/DL (ref 0.57–1)
EOSINOPHIL # BLD AUTO: 0.1 X10E3/UL (ref 0–0.4)
EOSINOPHIL NFR BLD AUTO: 1 %
ERYTHROCYTE [DISTWIDTH] IN BLOOD BY AUTOMATED COUNT: 15.6 % (ref 12.3–15.4)
EST. AVERAGE GLUCOSE BLD GHB EST-MCNC: 157 MG/DL
GLOBULIN SER CALC-MCNC: 3 G/DL (ref 1.5–4.5)
GLUCOSE SERPL-MCNC: 55 MG/DL (ref 65–99)
HBA1C MFR BLD: 7.1 % (ref 4.8–5.6)
HCT VFR BLD AUTO: 37.5 % (ref 34–46.6)
HDLC SERPL-MCNC: 45 MG/DL
HGB BLD-MCNC: 12.1 G/DL (ref 11.1–15.9)
IMM GRANULOCYTES # BLD AUTO: 0 X10E3/UL (ref 0–0.1)
IMM GRANULOCYTES NFR BLD AUTO: 0 %
INTERPRETATION, 910389: NORMAL
LDLC SERPL CALC-MCNC: 48 MG/DL (ref 0–99)
LYMPHOCYTES # BLD AUTO: 4.5 X10E3/UL (ref 0.7–3.1)
LYMPHOCYTES NFR BLD AUTO: 33 %
Lab: NORMAL
MCH RBC QN AUTO: 26.4 PG (ref 26.6–33)
MCHC RBC AUTO-ENTMCNC: 32.3 G/DL (ref 31.5–35.7)
MCV RBC AUTO: 82 FL (ref 79–97)
MONOCYTES # BLD AUTO: 0.8 X10E3/UL (ref 0.1–0.9)
MONOCYTES NFR BLD AUTO: 6 %
NEUTROPHILS # BLD AUTO: 8.1 X10E3/UL (ref 1.4–7)
NEUTROPHILS NFR BLD AUTO: 60 %
PLATELET # BLD AUTO: 277 X10E3/UL (ref 150–450)
POTASSIUM SERPL-SCNC: 3.7 MMOL/L (ref 3.5–5.2)
PROT SERPL-MCNC: 7.1 G/DL (ref 6–8.5)
RBC # BLD AUTO: 4.59 X10E6/UL (ref 3.77–5.28)
SODIUM SERPL-SCNC: 142 MMOL/L (ref 134–144)
TRIGL SERPL-MCNC: 57 MG/DL (ref 0–149)
VLDLC SERPL CALC-MCNC: 11 MG/DL (ref 5–40)
WBC # BLD AUTO: 13.5 X10E3/UL (ref 3.4–10.8)

## 2019-07-01 DIAGNOSIS — D72.829 LEUKOCYTOSIS, UNSPECIFIED TYPE: Primary | ICD-10-CM

## 2019-07-01 NOTE — PROGRESS NOTES
Please let patient know her hemoglobin A1c is 7.1. We will continue with the current management of diabetes. Her WBC count is elevated. I have in the past sent her to see the hematologist.  I will place another referral for to see the hematologist given her elevated white blood cell count.   Dorian Walsh MD

## 2019-07-12 NOTE — PROGRESS NOTES
Call made to pt and 2 identifiers used. Pt made aware of lab findings and plan of care. Pt verbalizes and understanding.

## 2019-07-16 DIAGNOSIS — E11.9 TYPE 2 DIABETES MELLITUS WITHOUT COMPLICATION, WITHOUT LONG-TERM CURRENT USE OF INSULIN (HCC): ICD-10-CM

## 2019-07-16 DIAGNOSIS — E78.49 OTHER HYPERLIPIDEMIA: ICD-10-CM

## 2019-07-30 ENCOUNTER — TELEPHONE (OUTPATIENT)
Dept: FAMILY MEDICINE CLINIC | Age: 54
End: 2019-07-30

## 2019-07-30 NOTE — TELEPHONE ENCOUNTER
Mili Thorne from [facility] called to inform that this patient's FitKit is being rejected due to no date being listed on it.  Any questions or concerns please contact Mili Thorne

## 2019-09-05 ENCOUNTER — OFFICE VISIT (OUTPATIENT)
Dept: FAMILY MEDICINE CLINIC | Age: 54
End: 2019-09-05

## 2019-09-05 VITALS
RESPIRATION RATE: 20 BRPM | DIASTOLIC BLOOD PRESSURE: 75 MMHG | OXYGEN SATURATION: 100 % | HEIGHT: 65 IN | HEART RATE: 86 BPM | WEIGHT: 165 LBS | TEMPERATURE: 97 F | SYSTOLIC BLOOD PRESSURE: 136 MMHG | BODY MASS INDEX: 27.49 KG/M2

## 2019-09-05 DIAGNOSIS — N95.1 MENOPAUSAL SYMPTOMS: ICD-10-CM

## 2019-09-05 DIAGNOSIS — Z23 ENCOUNTER FOR IMMUNIZATION: ICD-10-CM

## 2019-09-05 DIAGNOSIS — E11.9 TYPE 2 DIABETES MELLITUS WITHOUT COMPLICATION, WITHOUT LONG-TERM CURRENT USE OF INSULIN (HCC): Primary | ICD-10-CM

## 2019-09-05 DIAGNOSIS — E78.49 OTHER HYPERLIPIDEMIA: ICD-10-CM

## 2019-09-05 DIAGNOSIS — I10 ESSENTIAL HYPERTENSION: ICD-10-CM

## 2019-09-05 DIAGNOSIS — F17.200 TOBACCO USE DISORDER: ICD-10-CM

## 2019-09-05 DIAGNOSIS — Z12.11 SCREEN FOR COLON CANCER: ICD-10-CM

## 2019-09-05 RX ORDER — PAROXETINE 10 MG/1
10 TABLET, FILM COATED ORAL DAILY
Qty: 30 TAB | Refills: 3 | Status: SHIPPED | OUTPATIENT
Start: 2019-09-05 | End: 2019-09-05 | Stop reason: SDUPTHER

## 2019-09-05 NOTE — PROGRESS NOTES
Chief Complaint   Patient presents with    Follow Up Chronic Condition     1. Have you been to the ER, urgent care clinic since your last visit? Hospitalized since your last visit? No    2. Have you seen or consulted any other health care providers outside of the [address] [place] [address] since your last visit? Include any pap smears or colon screening. No     HPI  Sully Arellano comes in for follow-up care. Patient has diabetes mellitus type 2. She is on metformin and glipizide. Her blood glucose numbers have been stable. Her last HbA1c was 7.1. She is yet to get her eye exam done. Referral will be made. We will recheck her HbA1c at the next visit. Patient has hypertension. She is on losartan. Blood pressure is stable. We will send in a refill of medication. Patient has been having hot flashes. She is menopausal.  We discussed management options. I will give Paxil. I will follow-up at next visit. Patient has a history of CVA. She is on aspirin. We will continue with this medication. She has dyslipidemia. She is on atorvastatin. Plan to recheck lipid panel at next visit. Stable. Patient will get the flu vaccine. I will give a FIT stool kit for colon cancer screening. Patient continues to smoke. We discussed tobacco abuse and the advantages of smoking cessation. She will consider these. Past Medical History  Past Medical History:   Diagnosis Date    Diabetes ([facility] [address]     Hyperlipidemia     Hypertension     Stroke [facility])     2012       Surgical History  History reviewed. No pertinent surgical history.      Medications  Current Outpatient Medications   Medication Sig Dispense Refill    glucose blood VI test strips (ASCENSIA AUTODISC VI, ONE TOUCH ULTRA TEST VI) strip Check blood glucose 2 x day 200 Strip 3    lancets misc Check blood glucose 2 x day 200 Each 3    amLODIPine (NORVASC) 10 mg tablet TAKE 1 TABLET BY MOUTH DAILY 90 Tab 2    atorvastatin (LIPITOR) 80 mg tablet Take 1 Tab by mouth daily. 90 Tab 1    metFORMIN (GLUCOPHAGE) 1,000 mg tablet Take 1 Tab by mouth two (2) times daily (with meals). 180 Tab 1    aspirin delayed-release 81 mg tablet Take 1 Tab by mouth daily. 90 Tab 1    losartan (COZAAR) 100 mg tablet TAKE 1 TABLET BY MOUTH DAILY 90 Tab 0    glipiZIDE (GLUCOTROL) 5 mg tablet TAKE 1 TABLET BY MOUTH TWICE DAILY 60 Tab 0       Allergies  No Known Allergies    Family History  History reviewed. No pertinent family history. Social History  Social History     Socioeconomic History    Marital status: SINGLE     Spouse name: Not on file    Number of children: Not on file    Years of education: Not on file    Highest education level: Not on file   Occupational History    Not on file   Social Needs    Financial resource strain: Not on file    Food insecurity:     Worry: Not on file     Inability: Not on file    Transportation needs:     Medical: Not on file     Non-medical: Not on file   Tobacco Use    Smoking status: Current Every Day Smoker     Packs/day: 0.50     Types: Cigarettes    Smokeless tobacco: Never Used   Substance and Sexual Activity    Alcohol use:  Yes     Alcohol/week: 0.0 standard drinks     Frequency: Never     Comment: occassionally     Drug use: No    Sexual activity: Never   Lifestyle    Physical activity:     Days per week: Not on file     Minutes per session: Not on file    Stress: Not on file   Relationships    Social connections:     Talks on phone: Not on file     Gets together: Not on file     Attends Confucianist service: Not on file     Active member of club or organization: Not on file     Attends meetings of clubs or organizations: Not on file     Relationship status: Not on file    Intimate partner violence:     Fear of current or ex partner: Not on file     Emotionally abused: Not on file     Physically abused: Not on file     Forced sexual activity: Not on file   Other Topics Concern    Not on file   Social History Narrative    Not on file       Review of Systems  Review of Systems - Review of all systems is negative except as noted above in the HPI. Vital Signs  Visit Vitals  /75 (BP 1 Location: Left arm, BP Patient Position: Sitting)   Pulse 86   Temp 97 °F (36.1 °C) (Oral)   Resp 20   Ht 5' 5\" (1.651 m)   Wt 165 lb (74.8 kg)   LMP 09/03/2019   SpO2 100%   BMI 27.46 kg/m²         Physical Exam  Physical Examination: General appearance - oriented to person, place, and time, acyanotic, in no respiratory distress and well hydrated  Mental status - alert, oriented to person, place, and time, affect appropriate to mood  Neck - supple, no significant adenopathy  Lymphatics - no palpable lymphadenopathy  Chest - no tachypnea, retractions or cyanosis  Heart - normal rate and regular rhythm, S1 and S2 normal  Abdomen - no rebound tenderness noted  Neurological - neck supple without rigidity  Musculoskeletal - osteoarthritic changes noted in both hands  Extremities - intact peripheral pulses    Results  Results for orders placed or performed in visit on 06/27/19   CBC WITH AUTOMATED DIFF   Result Value Ref Range    WBC 13.5 (H) 3.4 - 10.8 x10E3/uL    RBC 4.59 3.77 - 5.28 x10E6/uL    HGB 12.1 11.1 - 15.9 g/dL    HCT 37.5 34.0 - 46.6 %    MCV 82 79 - 97 fL    MCH 26.4 (L) 26.6 - 33.0 pg    MCHC 32.3 31.5 - 35.7 g/dL    RDW 15.6 (H) 12.3 - 15.4 %    PLATELET 601 523 - 631 x10E3/uL    NEUTROPHILS 60 Not Estab. %    Lymphocytes 33 Not Estab. %    MONOCYTES 6 Not Estab. %    EOSINOPHILS 1 Not Estab. %    BASOPHILS 0 Not Estab. %    ABS. NEUTROPHILS 8.1 (H) 1.4 - 7.0 x10E3/uL    Abs Lymphocytes 4.5 (H) 0.7 - 3.1 x10E3/uL    ABS. MONOCYTES 0.8 0.1 - 0.9 x10E3/uL    ABS. EOSINOPHILS 0.1 0.0 - 0.4 x10E3/uL    ABS. BASOPHILS 0.0 0.0 - 0.2 x10E3/uL    IMMATURE GRANULOCYTES 0 Not Estab. %    ABS. IMM.  GRANS. 0.0 0.0 - 0.1 G19V9/OY   METABOLIC PANEL, COMPREHENSIVE   Result Value Ref Range    Glucose 55 (L) 65 - 99 mg/dL    BUN 4 (L) 6 - 24 mg/dL Creatinine 0.56 (L) 0.57 - 1.00 mg/dL    GFR est non- >59 mL/min/1.73    GFR est  >59 mL/min/1.73    BUN/Creatinine ratio 7 (L) 9 - 23    Sodium 142 134 - 144 mmol/L    Potassium 3.7 3.5 - 5.2 mmol/L    Chloride 105 96 - 106 mmol/L    CO2 26 20 - 29 mmol/L    Calcium 9.6 8.7 - 10.2 mg/dL    Protein, total 7.1 6.0 - 8.5 g/dL    Albumin 4.1 3.5 - 5.5 g/dL    GLOBULIN, TOTAL 3.0 1.5 - 4.5 g/dL    A-G Ratio 1.4 1.2 - 2.2    Bilirubin, total 0.5 0.0 - 1.2 mg/dL    Alk. phosphatase 113 39 - 117 IU/L    AST (SGOT) 13 0 - 40 IU/L    ALT (SGPT) 10 0 - 32 IU/L   LIPID PANEL   Result Value Ref Range    Cholesterol, total 104 100 - 199 mg/dL    Triglyceride 57 0 - 149 mg/dL    HDL Cholesterol 45 >39 mg/dL    VLDL, calculated 11 5 - 40 mg/dL    LDL, calculated 48 0 - 99 mg/dL   CVD REPORT   Result Value Ref Range    INTERPRETATION Note    HEMOGLOBIN A1C WITH EAG   Result Value Ref Range    Hemoglobin A1c 7.1 (H) 4.8 - 5.6 %    Estimated average glucose 157 mg/dL   DIABETES PATIENT EDUCATION   Result Value Ref Range    PDF Image Not applicable        ASSESSMENT and PLAN    ICD-10-CM ICD-9-CM    1. Type 2 diabetes mellitus without complication, without long-term current use of insulin (HCC) E11.9 250.00 REFERRAL TO OPHTHALMOLOGY   2. Essential hypertension I10 401.9    3. Encounter for immunization Z23 V03.89 INFLUENZA VIRUS VAC QUAD,SPLIT,PRESV FREE SYRINGE IM      HI IMMUNIZ ADMIN,1 SINGLE/COMB VAC/TOXOID   4. Screen for colon cancer Z12.11 V76.51 OCCULT BLOOD IMMUNOASSAY,DIAGNOSTIC   5. Menopausal symptoms N95.1 627.2 PARoxetine (PAXIL) 10 mg tablet   6. Tobacco use disorder F17.200 305.1    7.  Other hyperlipidemia E78.49 272.4      lab results and schedule of future lab studies reviewed with patient  reviewed diet, exercise and weight control  very strongly urged to quit smoking to reduce cardiovascular risk  cardiovascular risk and specific lipid/LDL goals reviewed  reviewed medications and side effects in detail  specific diabetic recommendations: low cholesterol diet, weight control and daily exercise discussed, home glucose monitoring emphasized, all medications, side effects and compliance discussed carefully, annual eye examinations at Ophthalmology discussed, glycohemoglobin and other lab monitoring discussed and long term diabetic complications discussed  use of aspirin to prevent MI and TIA's discussed      I have discussed the diagnosis with the patient and the intended plan of care as seen in the above orders. The patient has received an after-visit summary and questions were answered concerning future plans. I have discussed medication, side effects, and warnings with the patient in detail. The patient verbalized understanding and is in agreement with the plan of care. The patient will contact the office with any additional concerns. Rebecca Dye MD    PLEASE NOTE:   This document has been produced using voice recognition software.  Unrecognized errors in transcription may be present

## 2019-10-14 LAB
HEMOCCULT STL QL IA: NEGATIVE
SPECIMEN STATUS REPORT, ROLRST: NORMAL

## 2019-12-05 ENCOUNTER — OFFICE VISIT (OUTPATIENT)
Dept: FAMILY MEDICINE CLINIC | Age: 54
End: 2019-12-05

## 2019-12-05 VITALS
WEIGHT: 170 LBS | HEART RATE: 68 BPM | SYSTOLIC BLOOD PRESSURE: 131 MMHG | DIASTOLIC BLOOD PRESSURE: 68 MMHG | HEIGHT: 65 IN | RESPIRATION RATE: 12 BRPM | OXYGEN SATURATION: 99 % | TEMPERATURE: 97.4 F | BODY MASS INDEX: 28.32 KG/M2

## 2019-12-05 DIAGNOSIS — M25.512 LEFT SHOULDER PAIN, UNSPECIFIED CHRONICITY: ICD-10-CM

## 2019-12-05 DIAGNOSIS — Z12.31 ENCOUNTER FOR SCREENING MAMMOGRAM FOR MALIGNANT NEOPLASM OF BREAST: ICD-10-CM

## 2019-12-05 DIAGNOSIS — E11.9 TYPE 2 DIABETES MELLITUS WITHOUT COMPLICATION, WITHOUT LONG-TERM CURRENT USE OF INSULIN (HCC): ICD-10-CM

## 2019-12-05 DIAGNOSIS — E11.9 TYPE 2 DIABETES MELLITUS WITHOUT COMPLICATION, WITHOUT LONG-TERM CURRENT USE OF INSULIN (HCC): Primary | ICD-10-CM

## 2019-12-05 DIAGNOSIS — E78.49 OTHER HYPERLIPIDEMIA: ICD-10-CM

## 2019-12-05 DIAGNOSIS — F17.200 TOBACCO USE DISORDER: ICD-10-CM

## 2019-12-05 DIAGNOSIS — I10 ESSENTIAL HYPERTENSION: ICD-10-CM

## 2019-12-05 LAB
CREAT URIN, 17566: 50 MG/DL
HBA1C MFR BLD HPLC: 7.1 %
MICROALBUMIN UR TEST STR-MCNC: 30 MG/L
MICROALBUMIN/CREAT RATIO POC: NORMAL MG/G

## 2019-12-05 RX ORDER — GLIPIZIDE 5 MG/1
10 TABLET ORAL 2 TIMES DAILY
Qty: 120 TAB | Refills: 0 | Status: SHIPPED | OUTPATIENT
Start: 2019-12-05 | End: 2019-12-05 | Stop reason: SDUPTHER

## 2019-12-05 NOTE — PROGRESS NOTES
Mariam Loretta presents today for   Chief Complaint   Patient presents with    Follow-up     check up    Shoulder Pain     Left shoulder  2-3 days       1. Have you been to the ER, urgent care clinic since your last visit? Hospitalized since your last visit? No    2. Have you seen or consulted any other health care providers outside of the [address] [place] [address] since your last visit? Include any pap smears or colon screening.  No

## 2019-12-05 NOTE — PROGRESS NOTES
HPI  Prince Lantigua comes in for f/u care. DM2: Her blood glucose numbers are stable. She is on metformin and glipizide, stable. Will check hba1c and microalbumin today. Her HbA1c is 7.1. She should intensify labs and dietary modification. I will increase glipizide to 10 mg twice a day. HTN:  Denies headache, changes in vision or focal weakness. She is on amlodipine 10 mg daily and losartan 100 mg daily. Continue current treatment plan. Shoulder pain: she has left shoulder pain, comes with activity and laying on it. Relieved by repositioning. This is myofascial pain. Will continue supportive care. Tobacco abuse disorder: continues to smoke, advised to quit the habit. She has smoked activity for more than 30 years. Smokes about a pack a day. Dyslipidemia: on atorvastatin, stable on the medication. Will recheck lipid panel at next visit. Past Medical History  Past Medical History:   Diagnosis Date    Diabetes ([facility] [address]     Hyperlipidemia     Hypertension     Stroke [facility])     2012       Surgical History  History reviewed. No pertinent surgical history. Medications  Current Outpatient Medications   Medication Sig Dispense Refill    glucose blood VI test strips (ASCENSIA AUTODISC VI, ONE TOUCH ULTRA TEST VI) strip Check blood glucose 2 x day 200 Strip 3    lancets misc Check blood glucose 2 x day 200 Each 3    amLODIPine (NORVASC) 10 mg tablet TAKE 1 TABLET BY MOUTH DAILY 90 Tab 2    atorvastatin (LIPITOR) 80 mg tablet Take 1 Tab by mouth daily. 90 Tab 1    metFORMIN (GLUCOPHAGE) 1,000 mg tablet Take 1 Tab by mouth two (2) times daily (with meals). 180 Tab 1    aspirin delayed-release 81 mg tablet Take 1 Tab by mouth daily.  90 Tab 1    losartan (COZAAR) 100 mg tablet TAKE 1 TABLET BY MOUTH DAILY 90 Tab 0    glipiZIDE (GLUCOTROL) 5 mg tablet TAKE 1 TABLET BY MOUTH TWICE DAILY 60 Tab 0    PARoxetine (PAXIL) 10 mg tablet TAKE 1 TABLET BY MOUTH EVERY DAY 90 Tab 1       Allergies  No Known Allergies    Family History  History reviewed. No pertinent family history. Social History  Social History     Socioeconomic History    Marital status: SINGLE     Spouse name: Not on file    Number of children: Not on file    Years of education: Not on file    Highest education level: Not on file   Occupational History    Not on file   Social Needs    Financial resource strain: Not on file    Food insecurity:     Worry: Not on file     Inability: Not on file    Transportation needs:     Medical: Not on file     Non-medical: Not on file   Tobacco Use    Smoking status: Current Every Day Smoker     Packs/day: 0.50     Types: Cigarettes    Smokeless tobacco: Never Used   Substance and Sexual Activity    Alcohol use: Yes     Alcohol/week: 0.0 standard drinks     Frequency: Never     Comment: occassionally     Drug use: No    Sexual activity: Never   Lifestyle    Physical activity:     Days per week: Not on file     Minutes per session: Not on file    Stress: Not on file   Relationships    Social connections:     Talks on phone: Not on file     Gets together: Not on file     Attends Faith service: Not on file     Active member of club or organization: Not on file     Attends meetings of clubs or organizations: Not on file     Relationship status: Not on file    Intimate partner violence:     Fear of current or ex partner: Not on file     Emotionally abused: Not on file     Physically abused: Not on file     Forced sexual activity: Not on file   Other Topics Concern    Not on file   Social History Narrative    Not on file       Review of Systems  Review of Systems - Review of all systems is negative except as noted above in the HPI.     Vital Signs  Visit Vitals  /68 (BP 1 Location: Left arm, BP Patient Position: Sitting)   Pulse 68   Temp 97.4 °F (36.3 °C) (Oral)   Resp 12   Ht 5' 5\" (1.651 m)   Wt 170 lb (77.1 kg)   SpO2 99%   BMI 28.29 kg/m²         Physical Exam  Physical Examination: General appearance - oriented to person, place, and time and acyanotic, in no respiratory distress  Mental status - alert, oriented to person, place, and time, affect appropriate to mood  Mouth - mucous membranes moist, pharynx normal without lesions  Neck - supple, no significant adenopathy  Lymphatics - no palpable lymphadenopathy  Chest - decreased air entry noted bilateral lung bases  Heart - normal rate and regular rhythm, S1 and S2 normal  Abdomen - no rebound tenderness noted  Back exam - limited range of motion  Neurological - neck supple without rigidity  Musculoskeletal - full range of motion without pain  Extremities - intact peripheral pulses    Results  Results for orders placed or performed in visit on 09/05/19   OCCULT BLOOD IMMUNOASSAY,DIAGNOSTIC   Result Value Ref Range    Occult blood fecal, by IA Negative Negative   SPECIMEN STATUS REPORT   Result Value Ref Range    SPECIMEN STATUS REPORT COMMENT        ASSESSMENT and PLAN    ICD-10-CM ICD-9-CM    1. Type 2 diabetes mellitus without complication, without long-term current use of insulin (HCC) E11.9 250.00 AMB POC HEMOGLOBIN A1C      AMB POC URINE, MICROALBUMIN, SEMIQUANTITATIVE      REFERRAL TO OPHTHALMOLOGY      glipiZIDE (GLUCOTROL) 5 mg tablet   2. Essential hypertension I10 401.9    3. Tobacco use disorder F17.200 305.1    4. Other hyperlipidemia E78.49 272.4    5. Encounter for screening mammogram for malignant neoplasm of breast Z12.31 V76.12 [facility] MAMMO BI SCREENING INCL CAD   6.  Left shoulder pain, unspecified chronicity M25.512 719.41      lab results and schedule of future lab studies reviewed with patient  reviewed diet, exercise and weight control  cardiovascular risk and specific lipid/LDL goals reviewed  reviewed medications and side effects in detail  specific diabetic recommendations: low cholesterol diet, weight control and daily exercise discussed, home glucose monitoring emphasized, all medications, side effects and compliance discussed carefully, annual eye examinations at Ophthalmology discussed, glycohemoglobin and other lab monitoring discussed and long term diabetic complications discussed  use of aspirin to prevent MI and TIA's discussed      I have discussed the diagnosis with the patient and the intended plan of care as seen in the above orders. The patient has received an after-visit summary and questions were answered concerning future plans. I have discussed medication, side effects, and warnings with the patient in detail. The patient verbalized understanding and is in agreement with the plan of care. The patient will contact the office with any additional concerns. Pedrito Isbell MD    PLEASE NOTE:   This document has been produced using voice recognition software.  Unrecognized errors in transcription may be present

## 2019-12-05 NOTE — PROGRESS NOTES
Carol Simms presents today for   Chief Complaint   Patient presents with    Follow-up     check up    Shoulder Pain     Left shoulder  2-3 days       Is someone accompanying this pt? no    Is the patient using any DME equipment during OV? no    Depression Screening:  3 most recent PHQ Screens 6/27/2019   Little interest or pleasure in doing things Not at all   Feeling down, depressed, irritable, or hopeless Not at all   Total Score PHQ 2 0   Trouble falling or staying asleep, or sleeping too much -   Feeling tired or having little energy -   Poor appetite, weight loss, or overeating -   Feeling bad about yourself - or that you are a failure or have let yourself or your family down -   Trouble concentrating on things such as school, work, reading, or watching TV -   Moving or speaking so slowly that other people could have noticed; or the opposite being so fidgety that others notice -   Thoughts of being better off dead, or hurting yourself in some way -   PHQ 9 Score -   How difficult have these problems made it for you to do your work, take care of your home and get along with others -       Learning Assessment:  Learning Assessment 11/12/2015   PRIMARY LEARNER Patient   PRIMARY LANGUAGE ENGLISH   LEARNER PREFERENCE PRIMARY DEMONSTRATION   ANSWERED BY patient   RELATIONSHIP SELF       Abuse Screening:  No flowsheet data found. Fall Risk  Fall Risk Assessment, last 12 mths 2/23/2017   Able to walk? Yes   Fall in past 12 months? No       Health Maintenance reviewed and discussed and ordered per Provider. Health Maintenance Due   Topic Date Due    Shingrix Vaccine Age 50> (1 of 2) 09/20/2015    MICROALBUMIN Q1  12/21/2018    EYE EXAM RETINAL OR DILATED  04/06/2019    HEMOGLOBIN A1C Q6M  12/28/2019    BREAST CANCER SCRN MAMMOGRAM  03/29/2020   . Coordination of Care:  1. Have you been to the ER, urgent care clinic since your last visit? Hospitalized since your last visit? no    2.  Have you seen or consulted any other health care providers outside of the [address] [place] [address] since your last visit? Include any pap smears or colon screening.  no

## 2019-12-09 RX ORDER — GLIPIZIDE 5 MG/1
TABLET ORAL
Qty: 360 TAB | Refills: 0 | Status: SHIPPED | OUTPATIENT
Start: 2019-12-09 | End: 2020-10-07 | Stop reason: SDUPTHER

## 2020-10-07 ENCOUNTER — OFFICE VISIT (OUTPATIENT)
Dept: FAMILY MEDICINE CLINIC | Age: 55
End: 2020-10-07
Payer: MEDICARE

## 2020-10-07 VITALS
TEMPERATURE: 97.3 F | HEART RATE: 82 BPM | SYSTOLIC BLOOD PRESSURE: 138 MMHG | WEIGHT: 165 LBS | HEIGHT: 65 IN | RESPIRATION RATE: 18 BRPM | OXYGEN SATURATION: 98 % | DIASTOLIC BLOOD PRESSURE: 78 MMHG | BODY MASS INDEX: 27.49 KG/M2

## 2020-10-07 DIAGNOSIS — M54.50 CHRONIC MIDLINE LOW BACK PAIN WITHOUT SCIATICA: ICD-10-CM

## 2020-10-07 DIAGNOSIS — E78.49 OTHER HYPERLIPIDEMIA: ICD-10-CM

## 2020-10-07 DIAGNOSIS — Z12.11 SCREEN FOR COLON CANCER: ICD-10-CM

## 2020-10-07 DIAGNOSIS — Z00.00 MEDICARE ANNUAL WELLNESS VISIT, SUBSEQUENT: ICD-10-CM

## 2020-10-07 DIAGNOSIS — N95.1 MENOPAUSAL SYMPTOMS: ICD-10-CM

## 2020-10-07 DIAGNOSIS — Z23 ENCOUNTER FOR IMMUNIZATION: ICD-10-CM

## 2020-10-07 DIAGNOSIS — E11.9 COMPREHENSIVE DIABETIC FOOT EXAMINATION, TYPE 2 DM, ENCOUNTER FOR (HCC): ICD-10-CM

## 2020-10-07 DIAGNOSIS — I10 ESSENTIAL HYPERTENSION: ICD-10-CM

## 2020-10-07 DIAGNOSIS — Z71.89 ADVANCED DIRECTIVES, COUNSELING/DISCUSSION: ICD-10-CM

## 2020-10-07 DIAGNOSIS — F17.200 TOBACCO USE DISORDER: ICD-10-CM

## 2020-10-07 DIAGNOSIS — E11.21 TYPE 2 DIABETES WITH NEPHROPATHY (HCC): ICD-10-CM

## 2020-10-07 DIAGNOSIS — Z13.31 SCREENING FOR DEPRESSION: ICD-10-CM

## 2020-10-07 DIAGNOSIS — Z71.89 ACP (ADVANCE CARE PLANNING): ICD-10-CM

## 2020-10-07 DIAGNOSIS — G89.29 CHRONIC MIDLINE LOW BACK PAIN WITHOUT SCIATICA: ICD-10-CM

## 2020-10-07 DIAGNOSIS — E11.9 TYPE 2 DIABETES MELLITUS WITHOUT COMPLICATION, WITHOUT LONG-TERM CURRENT USE OF INSULIN (HCC): Primary | ICD-10-CM

## 2020-10-07 PROCEDURE — 2022F DILAT RTA XM EVC RTNOPTHY: CPT | Performed by: FAMILY MEDICINE

## 2020-10-07 PROCEDURE — G8510 SCR DEP NEG, NO PLAN REQD: HCPCS | Performed by: FAMILY MEDICINE

## 2020-10-07 PROCEDURE — 90686 IIV4 VACC NO PRSV 0.5 ML IM: CPT | Performed by: FAMILY MEDICINE

## 2020-10-07 PROCEDURE — 3017F COLORECTAL CA SCREEN DOC REV: CPT | Performed by: FAMILY MEDICINE

## 2020-10-07 PROCEDURE — G8752 SYS BP LESS 140: HCPCS | Performed by: FAMILY MEDICINE

## 2020-10-07 PROCEDURE — G0439 PPPS, SUBSEQ VISIT: HCPCS | Performed by: FAMILY MEDICINE

## 2020-10-07 PROCEDURE — G8419 CALC BMI OUT NRM PARAM NOF/U: HCPCS | Performed by: FAMILY MEDICINE

## 2020-10-07 PROCEDURE — G8754 DIAS BP LESS 90: HCPCS | Performed by: FAMILY MEDICINE

## 2020-10-07 PROCEDURE — G8427 DOCREV CUR MEDS BY ELIG CLIN: HCPCS | Performed by: FAMILY MEDICINE

## 2020-10-07 PROCEDURE — 3046F HEMOGLOBIN A1C LEVEL >9.0%: CPT | Performed by: FAMILY MEDICINE

## 2020-10-07 PROCEDURE — 99497 ADVNCD CARE PLAN 30 MIN: CPT | Performed by: FAMILY MEDICINE

## 2020-10-07 PROCEDURE — G9899 SCRN MAM PERF RSLTS DOC: HCPCS | Performed by: FAMILY MEDICINE

## 2020-10-07 PROCEDURE — 99214 OFFICE O/P EST MOD 30 MIN: CPT | Performed by: FAMILY MEDICINE

## 2020-10-07 RX ORDER — MELOXICAM 15 MG/1
15 TABLET ORAL DAILY
Qty: 30 TAB | Refills: 3 | Status: SHIPPED | OUTPATIENT
Start: 2020-10-07 | End: 2021-01-05 | Stop reason: SDUPTHER

## 2020-10-07 RX ORDER — LANCETS
EACH MISCELLANEOUS
Qty: 200 EACH | Refills: 3 | Status: SHIPPED | OUTPATIENT
Start: 2020-10-07 | End: 2021-05-20 | Stop reason: SDUPTHER

## 2020-10-07 RX ORDER — LOSARTAN POTASSIUM 100 MG/1
TABLET ORAL
Qty: 90 TAB | Refills: 1 | Status: SHIPPED | OUTPATIENT
Start: 2020-10-07 | End: 2021-10-06 | Stop reason: SDUPTHER

## 2020-10-07 RX ORDER — METFORMIN HYDROCHLORIDE 1000 MG/1
1000 TABLET ORAL 2 TIMES DAILY WITH MEALS
Qty: 180 TAB | Refills: 1 | Status: SHIPPED | OUTPATIENT
Start: 2020-10-07 | End: 2021-02-18 | Stop reason: SDUPTHER

## 2020-10-07 RX ORDER — ATORVASTATIN CALCIUM 80 MG/1
80 TABLET, FILM COATED ORAL DAILY
Qty: 90 TAB | Refills: 1 | Status: SHIPPED | OUTPATIENT
Start: 2020-10-07 | End: 2021-07-19

## 2020-10-07 RX ORDER — PAROXETINE 10 MG/1
10 TABLET, FILM COATED ORAL DAILY
Qty: 30 TAB | Refills: 3 | Status: SHIPPED | OUTPATIENT
Start: 2020-10-07 | End: 2021-10-06

## 2020-10-07 RX ORDER — GLIPIZIDE 5 MG/1
TABLET ORAL
Qty: 360 TAB | Refills: 1 | Status: SHIPPED | OUTPATIENT
Start: 2020-10-07 | End: 2021-10-06 | Stop reason: SDUPTHER

## 2020-10-07 RX ORDER — ASPIRIN 81 MG/1
81 TABLET ORAL DAILY
Qty: 90 TAB | Refills: 1 | Status: SHIPPED | OUTPATIENT
Start: 2020-10-07

## 2020-10-07 NOTE — PROGRESS NOTES
Chief Complaint   Patient presents with    Annual Wellness Visit    Back Pain     1. Have you been to the ER, urgent care clinic since your last visit? Hospitalized since your last visit? No    2. Have you seen or consulted any other health care providers outside of the [address] [place] [address] since your last visit? Include any pap smears or colon screening.  No

## 2020-10-07 NOTE — ACP (ADVANCE CARE PLANNING)
Advance Care Planning       Advance Care Planning (ACP) Physician/NP/PA (Provider) Conversation        Date of ACP Conversation: 10/7/2020    Conversation Conducted with:   Patient with Decision Making [address]  [facility] Decision Maker:    Current Designated Health Care Decision Maker:     If no Authorized Decision Maker has previously been identified, then patient chooses [place]:  \"Who would you like to name as your primary health care decision-maker? \"    Name: Chery Gaviria   Relationship: son Phone number: [phone]  Miguel Bird this person be reached easily? \" YES    Care Preferences:    Hospitalization: \"If your health worsens and it becomes clear that your chance of recovery is unlikely, what would your preference be regarding hospitalization? \"  If the patient would want hospitalization, answer \"yes\". If the patient would prefer comfort-focused treatment without hospitalization, answer \"no\". yes      Ventilation: \"If you were in your present state of health and suddenly became very ill and were unable to breathe on your own, what would your preference be about the use of a ventilator (breathing machine) if it was available to you? \"    If patient would desire the use of a ventilator (breathing machine), answer \"yes\", if not answer \"no\":yes    \"If your health worsens and it becomes clear that your chance of recovery is unlikely, what would your preference be about the use of a ventilator (breathing machine) if it was available to you? \"   yes      Resuscitation:  \"CPR works best to restart the heart when there is a sudden event, like a heart attack, in someone who is otherwise healthy. Unfortunately, CPR does not typically restart the heart for people who have serious health conditions or who are very sick. \"    \"In the event your heart stopped as a result of an underlying serious health condition, would you want attempts to be made to restart your heart (answer \"yes\" for attempt to resuscitate) or would you prefer a natural death (answer \"no\" for do not attempt to resuscitate)? \"   yes      Conversation Outcomes / Follow-Up Plan:   Recommended completion of Advance Directive      Length of Voluntary ACP Conversation in minutes:  16 minutes      Carrillo Rivera MD

## 2020-10-07 NOTE — PROGRESS NOTES
This is the Subsequent Medicare Annual Wellness Exam, performed 12 months or more after the Initial AWV or the last Subsequent AWV    I have reviewed the patient's medical history in detail and updated the computerized patient record. History   Rosario Shearer comes in for Medicare wellness exam.    Patient Active Problem List   Diagnosis Code    Midline low back pain without sciatica M54.5    Tobacco use disorder F17.200    History of CVA (cerebrovascular accident) Z80.78    Essential hypertension I10    Hyperlipidemia E78.5    Type 2 diabetes mellitus without complication ([facility] [address] T88.3    Type 2 diabetes mellitus without complication, without long-term current use of insulin (HCC) E11.9    ACP (advance care planning) Z71.89     Past Medical History:   Diagnosis Date    Diabetes ([facility] [address]     Hyperlipidemia     Hypertension     Stroke ([facility] [address]     2012      No past surgical history on file. Current Outpatient Medications   Medication Sig Dispense Refill    glipiZIDE (GLUCOTROL) 5 mg tablet TAKE 2 TABLETS BY MOUTH TWICE DAILY 360 Tab 0    glucose blood VI test strips (ASCENSIA AUTODISC VI, ONE TOUCH ULTRA TEST VI) strip Check blood glucose 2 x day 200 Strip 3    lancets misc Check blood glucose 2 x day 200 Each 3    amLODIPine (NORVASC) 10 mg tablet TAKE 1 TABLET BY MOUTH DAILY 90 Tab 2    atorvastatin (LIPITOR) 80 mg tablet Take 1 Tab by mouth daily. 90 Tab 1    metFORMIN (GLUCOPHAGE) 1,000 mg tablet Take 1 Tab by mouth two (2) times daily (with meals). 180 Tab 1    aspirin delayed-release 81 mg tablet Take 1 Tab by mouth daily. 90 Tab 1    losartan (COZAAR) 100 mg tablet TAKE 1 TABLET BY MOUTH DAILY 90 Tab 0     No Known Allergies    No family history on file. Social History     Tobacco Use    Smoking status: Current Every Day Smoker     Packs/day: 0.50     Types: Cigarettes    Smokeless tobacco: Never Used   Substance Use Topics    Alcohol use:  Yes     Alcohol/week: 0.0 standard drinks Frequency: Never     Comment: occassionally        Depression Risk Factor Screening:     3 most recent PHQ Screens 10/7/2020   Little interest or pleasure in doing things Not at all   Feeling down, depressed, irritable, or hopeless Not at all   Total Score PHQ 2 0   Trouble falling or staying asleep, or sleeping too much -   Feeling tired or having little energy -   Poor appetite, weight loss, or overeating -   Feeling bad about yourself - or that you are a failure or have let yourself or your family down -   Trouble concentrating on things such as school, work, reading, or watching TV -   Moving or speaking so slowly that other people could have noticed; or the opposite being so fidgety that others notice -   Thoughts of being better off dead, or hurting yourself in some way -   PHQ 9 Score -   How difficult have these problems made it for you to do your work, take care of your home and get along with others -       Alcohol Risk Screen   Do you average more than 1 drink per night or more than 7 drinks a week:  No    On any one occasion in the past three months have you have had more than 3 drinks containing alcohol:  No        Functional Ability and Level of Safety:   1. Was the patient's timed Up and GO test unsteady or longer than 30 seconds? Yes  2. Does the patient need help with the phone, transportation, shopping, preparing meals, housework, laundry, medications or managing money? No  3. Does the patients' home have rugs in the hallway, lack grab bars in the bathroom, lack handrails on the stairs or have poor lighting? No  4. Have you noticed any hearing difficulties? No  Hearing Evaluation:    Hearing: Hearing is good. Activities of Daily Living: The home contains: no safety equipment. Patient does total self care     Ambulation: with no difficulty     Fall Risk:  Fall Risk Assessment, last 12 mths 2/23/2017   Able to walk? Yes   Fall in past 12 months?  No     Abuse Screen:  Patient is not abused Cognitive Screening   Has your family/caregiver stated any concerns about your memory: no     Cognitive Screening: Normal - Verbal Fluency Test    Patient Care Team   Patient Care Team:  Isamar London MD as PCP - General (Family Medicine)  Isamar London MD as PCP - [facility] Empaneled Provider  Kimberley Reece MD (Family Medicine)    Assessment/Plan   Education and counseling provided:  Are appropriate based on today's review and evaluation  Screening Mammography  Cardiovascular screening blood test    1. Medicare annual wellness visit, subsequent    2. Screen for colon cancer  - OCCULT BLOOD IMMUNOASSAY,DIAGNOSTIC; Future    3. Encounter for immunization  - INFLUENZA VIRUS VAC QUAD,SPLIT,PRESV FREE SYRINGE IM    4. ACP (advance care planning)  - ADVANCE CARE PLANNING FIRST 30 MINS    5. Advanced directives, counseling/discussion  - ADVANCE CARE PLANNING FIRST 30 MINS  - FULL CODE    6. Screening for depression  - [address] Maintenance Due   Topic Date Due    Shingrix Vaccine Age 50> (1 of 2) 09/20/2015    Eye Exam Retinal or Dilated  04/06/2019    Breast Cancer Screen Mammogram  03/29/2020    Medicare Yearly Exam  06/27/2020    Foot Exam Q1  06/27/2020    A1C test (Diabetic or Prediabetic)  06/28/2020    Lipid Screen  06/28/2020    Flu Vaccine (1) 09/01/2020    FOBT Q1Y Age 50-75  10/09/2020     I have discussed the diagnosis with the patient and the intended plan of care as seen in the above orders. The patient has received an after-visit summary and questions were answered concerning future plans. I have discussed medication, side effects, and warnings with the patient in detail. The patient verbalized understanding and is in agreement with the plan of care. The patient will contact the office with any additional concerns. Personalized preventative plan of care was discussed, printed and given to patient.     Nupur Gipson MD

## 2020-10-07 NOTE — PROGRESS NOTES
[facility] in [place]  Karuna [facility] comes in for f/u care. DM2: Patient has type 2 diabetes mellitus. She is on metformin and glipizide. Has not taken her medication for about 2 months now. She ran out of medication but did not call in to get a refill of the same. I did emphasize the need to be compliant with treatment plan. I did a foot exam that is stable. We will recheck labs today. Will do her HbA1c, CBC and CMP. We will also check a lipid panel. Patient should intensify lifestyle and dietary modification. She states that she did see the  ophthalmologist and have an eye exam done earlier this year. We will request the records. HTN: Patient has hypertension. Blood pressure is stable. She is on losartan. We will continue with the current treatment plan. She denies headache, changes in vision or focal weakness. We will recheck labs today. Back pain: Patient has chronic low back pain. This has been worse especially with activity. He does get stiffness of the back in the mornings. She denies bladder or bowel dysfunction. Denies numbness or tingling of the lower extremities. We will do an x-ray of the lumbar spine. I will start her on meloxicam daily. I will follow-up at next visit. We discussed supportive care measures including icing, heat pack and activity as tolerated. Tobacco abuse d/o: Patient continues to smoke though has cut back. She takes a pack in about 5 days now. I did emphasize the need to quit smoking completely. She will continue to cut back on the habit. She does not want any medication or nicotine replacement therapy at the moment. Dyslipidemia: Patient has dyslipidemia. She is on Lipitor 80 mg daily. Has not taken the medication in 2 months. I will send in a refill of the same. We will recheck a lipid panel today. Menopausal symptoms: Patient has hot flashes that have been more frequent lately. These occur mainly at night. She would like to try some medication for this.   We discussed options. I will give Paxil. I will follow-up at next visit. Patient has a BMI of 27.46. She should intensify lifestyle and dietary modification. Health maintenance: Patient will get flu vaccine today. She does need to get her mammogram done. We will give the FIT kit for colon cancer screening. Past Medical History  Past Medical History:   Diagnosis Date    Diabetes ([address]     Hyperlipidemia     Hypertension     Stroke [facility])     2012       Surgical History  No past surgical history on file. Medications  Current Outpatient Medications   Medication Sig Dispense Refill    glipiZIDE (GLUCOTROL) 5 mg tablet TAKE 2 TABLETS BY MOUTH TWICE DAILY 360 Tab 1    glucose blood VI test strips (ASCENSIA AUTODISC VI, ONE TOUCH ULTRA TEST VI) strip Check blood glucose 2 x day 200 Strip 3    lancets misc Check blood glucose 2 x day 200 Each 3    atorvastatin (LIPITOR) 80 mg tablet Take 1 Tab by mouth daily. 90 Tab 1    metFORMIN (GLUCOPHAGE) 1,000 mg tablet Take 1 Tab by mouth two (2) times daily (with meals). 180 Tab 1    aspirin delayed-release 81 mg tablet Take 1 Tab by mouth daily. 90 Tab 1    losartan (COZAAR) 100 mg tablet TAKE 1 TABLET BY MOUTH DAILY 90 Tab 1    meloxicam (MOBIC) 15 mg tablet Take 1 Tab by mouth daily. 30 Tab 3    PARoxetine (PAXIL) 10 mg tablet Take 1 Tab by mouth daily. Indications: \"change of life\" signs 30 Tab 3    amLODIPine (NORVASC) 10 mg tablet TAKE 1 TABLET BY MOUTH DAILY 90 Tab 2       Allergies  No Known Allergies    Family History  No family history on file.     Social History  Social History     Socioeconomic History    Marital status: SINGLE     Spouse name: Not on file    Number of children: Not on file    Years of education: Not on file    Highest education level: Not on file   Occupational History    Not on file   Social Needs    Financial resource strain: Not on file    Food insecurity     Worry: Not on file     Inability: Not on file   [facility] Transportation needs     Medical: Not on file     Non-medical: Not on file   Tobacco Use    Smoking status: Current Every Day Smoker     Packs/day: 0.50     Types: Cigarettes    Smokeless tobacco: Never Used   Substance and Sexual Activity    Alcohol use: Yes     Alcohol/week: 0.0 standard drinks     Frequency: Never     Comment: occassionally     Drug use: No    Sexual activity: Never   Lifestyle    Physical activity     Days per week: Not on file     Minutes per session: Not on file    Stress: Not on file   Relationships    Social connections     Talks on phone: Not on file     Gets together: Not on file     Attends [employer] service: Not on file     Active member of club or organization: Not on file     Attends meetings of clubs or organizations: Not on file     Relationship status: Not on file    Intimate partner violence     Fear of current or ex partner: Not on file     Emotionally abused: Not on file     Physically abused: Not on file     Forced sexual activity: Not on file   Other Topics Concern    Not on file   Social History Narrative    Not on file       Review of Systems  Review of Systems - Review of all systems is negative except as noted above in the HPI.     Vital Signs  Visit Vitals  /78 (BP 1 Location: Left arm, BP Patient Position: Sitting)   Pulse 82   Temp 97.3 °F (36.3 °C) (Temporal)   Resp 18   Ht 5' 5\" (1.651 m)   Wt 165 lb (74.8 kg)   SpO2 98%   BMI 27.46 kg/m²         Physical Exam  Physical Examination: General appearance - alert, well appearing, and in no distress, oriented to person, place, and time, acyanotic, in no respiratory distress and well hydrated  Mental status - alert, oriented to person, place, and time, affect appropriate to mood  Neck - supple, no significant adenopathy  Lymphatics - no palpable lymphadenopathy, no hepatosplenomegaly  Chest - clear to auscultation, no wheezes, rales or rhonchi, symmetric air entry  Heart - normal rate and regular rhythm, S1 and S2 normal  Abdomen - soft, nontender, nondistended, no masses or organomegaly  Back exam - limited range of motion, pain with motion noted during exam, tenderness noted paralumbar muscles and midline lumbar spine  Neurological - abnormal neurological exam unchanged from prior examinations  Musculoskeletal - osteoarthritic changes noted in both hands  Extremities - no pedal edema noted, intact peripheral pulses  Diabetic foot exam:     Left Foot:   Visual Exam: normal    Pulse DP: 2+ (normal)   Filament test: normal sensation        Right Foot:   Visual Exam: normal    Pulse DP: 2+ (normal)   Filament test: normal sensation      Results  Results for orders placed or performed in visit on 12/05/19   AMB POC HEMOGLOBIN A1C   Result Value Ref Range    Hemoglobin A1c (POC) 7.1 %   AMB POC URINE, MICROALBUMIN, SEMIQUANTITATIVE   Result Value Ref Range    Microalbumin urine (POC) 30 MG/L    Microalbumin/creat ratio (POC)  <30 MG/G    CREAT URIN 50 mg/dl       ASSESSMENT and PLAN    ICD-10-CM ICD-9-CM    1. Type 2 diabetes mellitus without complication, without long-term current use of insulin ([facility])  E11.9 250.00 glipiZIDE (GLUCOTROL) 5 mg tablet      glucose blood VI test strips (ASCENSIA AUTODISC VI, ONE TOUCH ULTRA TEST VI) strip      lancets misc      metFORMIN (GLUCOPHAGE) 1,000 mg tablet      aspirin delayed-release 81 mg tablet       DIABETES FOOT EXAM      CBC WITH AUTOMATED DIFF      METABOLIC PANEL, COMPREHENSIVE      LIPID PANEL      HEMOGLOBIN A1C WITH EAG   2. Essential hypertension  I10 401.9 atorvastatin (LIPITOR) 80 mg tablet      losartan (COZAAR) 100 mg tablet   3. Type 2 diabetes with nephropathy (HCC)  E11.21 250.40      583.81    4. Comprehensive diabetic foot examination, type 2 DM, encounter for ([facility]  E11.9 250.00  DIABETES FOOT EXAM   5. Tobacco use disorder  F17.200 305.1    6. Other hyperlipidemia  E78.49 272.4    7.  Chronic midline low back pain without sciatica  M54.5 724.2 meloxicam (MOBIC) 15 mg tablet    G89.29 338.29 XR SPINE LUMB 2 OR 3 V   8. Menopausal symptoms  N95.1 627.2 PARoxetine (PAXIL) 10 mg tablet   9. Medicare annual wellness visit, subsequent  Z00.00 V70.0    10. Screen for colon cancer  Z12.11 V76.51 OCCULT BLOOD IMMUNOASSAY,DIAGNOSTIC   11. Encounter for immunization  Z23 V03.89 INFLUENZA VIRUS VAC QUAD,SPLIT,PRESV FREE SYRINGE IM   12. ACP (advance care planning)  Z71.89 V65.49 ADVANCE CARE PLANNING FIRST 30 MINS   13. Advanced directives, counseling/discussion  Z71.89 V65.49 ADVANCE CARE PLANNING FIRST 30 MINS      FULL CODE   14. Screening for depression  Z13.31 V79.0 [address]     lab results and schedule of future lab studies reviewed with patient  reviewed diet, exercise and weight control  cardiovascular risk and specific lipid/LDL goals reviewed  reviewed medications and side effects in detail  specific diabetic recommendations: low cholesterol diet, weight control and daily exercise discussed, home glucose monitoring emphasized, all medications, side effects and compliance discussed carefully, foot care discussed and Podiatry visits discussed, annual eye examinations at Ophthalmology discussed, glycohemoglobin and other lab monitoring discussed and long term diabetic complications discussed  radiology results and schedule of future radiology studies reviewed with patient      I have discussed the diagnosis with the patient and the intended plan of care as seen in the above orders. The patient has received an after-visit summary and questions were answered concerning future plans. I have discussed medication, side effects, and warnings with the patient in detail. The patient verbalized understanding and is in agreement with the plan of care. The patient will contact the office with any additional concerns. Zoe Mcdaniel MD    PLEASE NOTE:   This document has been produced using voice recognition software. Unrecognized errors in transcription may be present

## 2020-10-07 NOTE — PATIENT INSTRUCTIONS
Medicare Wellness Visit, Female The best way to live healthy is to have a lifestyle where you eat a well-balanced diet, exercise regularly, limit alcohol use, and quit all forms of tobacco/nicotine, if applicable. Regular preventive services are another way to keep healthy. Preventive services (vaccines, screening tests, monitoring & exams) can help personalize your care plan, which helps you manage your own care. Screening tests can find health problems at the earliest stages, when they are easiest to treat. [facility] follows the current, evidence-based guidelines published by the [facility] Sutton ([facility]) when recommending preventive services for our patients. Because we follow these guidelines, sometimes recommendations change over time as research supports it. (For example, mammograms used to be recommended annually. Even though Medicare will still pay for an annual mammogram, the newer guidelines recommend a mammogram every two years for women of average risk). Of course, you and your doctor may decide to screen more often for some diseases, based on your risk and your co-morbidities (chronic disease you are already diagnosed with). Preventive services for you include: - Medicare offers their members a free annual wellness visit, which is time for you and your primary care provider to discuss and plan for your preventive service needs. Take advantage of this benefit every year! 
-All adults over the age of 72 should receive the recommended pneumonia vaccines. Current USPSTF guidelines recommend a series of two vaccines for the best pneumonia protection.  
-All adults should have a flu vaccine yearly and a tetanus vaccine every 10 years.  
-All adults age 48 and older should receive the shingles vaccines (series of two vaccines). -All adults age 38-68 who are overweight should have a diabetes screening test once every three years. -All adults born between [birth year] and 1965 should be screened once for Hepatitis C. 
-Other screening tests and preventive services for persons with diabetes include: an eye exam to screen for diabetic retinopathy, a kidney function test, a foot exam, and stricter control over your cholesterol.  
-Cardiovascular screening for adults with routine risk involves an electrocardiogram (ECG) at intervals determined by your doctor.  
-Colorectal cancer screenings should be done for adults age 54-65 with no increased risk factors for colorectal cancer. There are a number of acceptable methods of screening for this type of cancer. Each test has its own benefits and drawbacks. Discuss with your doctor what is most appropriate for you during your annual wellness visit. The different tests include: colonoscopy (considered the best screening method), a fecal occult blood test, a fecal DNA test, and sigmoidoscopy. 
 
-A bone mass density test is recommended when a woman turns 65 to screen for osteoporosis. This test is only recommended one time, as a screening. Some providers will use this same test as a disease monitoring tool if you already have osteoporosis. -Breast cancer screenings are recommended every other year for women of normal risk, age 54-69. 
-Cervical cancer screenings for women over age 72 are only recommended with certain risk factors. Here is a list of your current Health Maintenance items (your personalized list of preventive services) with a due date: 
Health Maintenance Due Topic Date Due  Shingles Vaccine (1 of 2) 09/20/2015 [address] [place] [address] Eye Exam  04/06/2019  Mammogram  03/29/2020 [address] [place] [address] Annual Well Visit  06/27/2020 [address] [place] [address] Diabetic Foot Care  06/27/2020  Hemoglobin A1C    06/28/2020  Cholesterol Test   06/28/2020  Yearly Flu Vaccine (1) 09/01/2020  Colon Cancer Stool Test  10/09/2020 Medicare Wellness Visit, Female The best way to live healthy is to have a lifestyle where you eat a well-balanced diet, exercise regularly, limit alcohol use, and quit all forms of tobacco/nicotine, if applicable. Regular preventive services are another way to keep healthy. Preventive services (vaccines, screening tests, monitoring & exams) can help personalize your care plan, which helps you manage your own care. Screening tests can find health problems at the earliest stages, when they are easiest to treat. [facility] follows the current, evidence-based guidelines published by the [facility] ([facility]) when recommending preventive services for our patients. Because we follow these guidelines, sometimes recommendations change over time as research supports it. (For example, mammograms used to be recommended annually. Even though Medicare will still pay for an annual mammogram, the newer guidelines recommend a mammogram every two years for women of average risk). Of course, you and your doctor may decide to screen more often for some diseases, based on your risk and your co-morbidities (chronic disease you are already diagnosed with). Preventive services for you include: - Medicare offers their members a free annual wellness visit, which is time for you and your primary care provider to discuss and plan for your preventive service needs. Take advantage of this benefit every year! 
-All adults over the age of 72 should receive the recommended pneumonia vaccines. Current USPSTF guidelines recommend a series of two vaccines for the best pneumonia protection.  
-All adults should have a flu vaccine yearly and a tetanus vaccine every 10 years.  
-All adults age 48 and older should receive the shingles vaccines (series of two vaccines).      
-All adults age 38-68 who are overweight should have a diabetes screening test once every three years.  
-All adults born between [birth year] and 1965 should be screened once for Hepatitis C. 
 -Other screening tests and preventive services for persons with diabetes include: an eye exam to screen for diabetic retinopathy, a kidney function test, a foot exam, and stricter control over your cholesterol.  
-Cardiovascular screening for adults with routine risk involves an electrocardiogram (ECG) at intervals determined by your doctor.  
-Colorectal cancer screenings should be done for adults age 54-65 with no increased risk factors for colorectal cancer. There are a number of acceptable methods of screening for this type of cancer. Each test has its own benefits and drawbacks. Discuss with your doctor what is most appropriate for you during your annual wellness visit. The different tests include: colonoscopy (considered the best screening method), a fecal occult blood test, a fecal DNA test, and sigmoidoscopy. 
 
-A bone mass density test is recommended when a woman turns 65 to screen for osteoporosis. This test is only recommended one time, as a screening. Some providers will use this same test as a disease monitoring tool if you already have osteoporosis. -Breast cancer screenings are recommended every other year for women of normal risk, age 54-69. 
-Cervical cancer screenings for women over age 72 are only recommended with certain risk factors. Here is a list of your current Health Maintenance items (your personalized list of preventive services) with a due date: 
Health Maintenance Due Topic Date Due Anum Miller Eye Exam  04/06/2019  Mammogram  03/29/2020 Anum Miller Annual Well Visit  06/27/2020  Hemoglobin A1C    06/28/2020  Cholesterol Test   06/28/2020  Yearly Flu Vaccine (1) 09/01/2020  Colon Cancer Stool Test  10/09/2020

## 2020-10-10 LAB
ALBUMIN SERPL-MCNC: 4.5 G/DL (ref 3.8–4.9)
ALBUMIN/GLOB SERPL: 1.3 {RATIO} (ref 1.2–2.2)
ALP SERPL-CCNC: 154 IU/L (ref 39–117)
ALT SERPL-CCNC: 16 IU/L (ref 0–32)
AST SERPL-CCNC: 18 IU/L (ref 0–40)
BASOPHILS # BLD AUTO: 0.1 X10E3/UL (ref 0–0.2)
BASOPHILS NFR BLD AUTO: 1 %
BILIRUB SERPL-MCNC: 0.6 MG/DL (ref 0–1.2)
BUN SERPL-MCNC: 7 MG/DL (ref 6–24)
BUN/CREAT SERPL: 11 (ref 9–23)
CALCIUM SERPL-MCNC: 10.4 MG/DL (ref 8.7–10.2)
CHLORIDE SERPL-SCNC: 98 MMOL/L (ref 96–106)
CHOLEST SERPL-MCNC: 187 MG/DL (ref 100–199)
CO2 SERPL-SCNC: 20 MMOL/L (ref 20–29)
CREAT SERPL-MCNC: 0.61 MG/DL (ref 0.57–1)
EOSINOPHIL # BLD AUTO: 0.1 X10E3/UL (ref 0–0.4)
EOSINOPHIL NFR BLD AUTO: 1 %
ERYTHROCYTE [DISTWIDTH] IN BLOOD BY AUTOMATED COUNT: 14.1 % (ref 11.7–15.4)
EST. AVERAGE GLUCOSE BLD GHB EST-MCNC: 194 MG/DL
GLOBULIN SER CALC-MCNC: 3.4 G/DL (ref 1.5–4.5)
GLUCOSE SERPL-MCNC: 171 MG/DL (ref 65–99)
HBA1C MFR BLD: 8.4 % (ref 4.8–5.6)
HCT VFR BLD AUTO: 44 % (ref 34–46.6)
HDLC SERPL-MCNC: 53 MG/DL
HGB BLD-MCNC: 14.5 G/DL (ref 11.1–15.9)
IMM GRANULOCYTES # BLD AUTO: 0 X10E3/UL (ref 0–0.1)
IMM GRANULOCYTES NFR BLD AUTO: 0 %
INTERPRETATION, 910389: NORMAL
LDLC SERPL CALC-MCNC: 117 MG/DL (ref 0–99)
LYMPHOCYTES # BLD AUTO: 3.7 X10E3/UL (ref 0.7–3.1)
LYMPHOCYTES NFR BLD AUTO: 36 %
Lab: NORMAL
MCH RBC QN AUTO: 26.4 PG (ref 26.6–33)
MCHC RBC AUTO-ENTMCNC: 33 G/DL (ref 31.5–35.7)
MCV RBC AUTO: 80 FL (ref 79–97)
MONOCYTES # BLD AUTO: 0.8 X10E3/UL (ref 0.1–0.9)
MONOCYTES NFR BLD AUTO: 7 %
NEUTROPHILS # BLD AUTO: 5.8 X10E3/UL (ref 1.4–7)
NEUTROPHILS NFR BLD AUTO: 55 %
PLATELET # BLD AUTO: 276 X10E3/UL (ref 150–450)
POTASSIUM SERPL-SCNC: 4.2 MMOL/L (ref 3.5–5.2)
PROT SERPL-MCNC: 7.9 G/DL (ref 6–8.5)
RBC # BLD AUTO: 5.5 X10E6/UL (ref 3.77–5.28)
SODIUM SERPL-SCNC: 137 MMOL/L (ref 134–144)
SPECIMEN STATUS REPORT, ROLRST: NORMAL
TRIGL SERPL-MCNC: 94 MG/DL (ref 0–149)
VLDLC SERPL CALC-MCNC: 17 MG/DL (ref 5–40)
WBC # BLD AUTO: 10.4 X10E3/UL (ref 3.4–10.8)

## 2020-10-13 NOTE — PROGRESS NOTES
Please let patient know her HbA1c is 8.4. This is worse than the previous 7.1. She has been off her medication for months and that she does need to get back on her medication. She should intensify lifestyle and dietary modification. We will recheck labs in 3 months. Her LDL cholesterol is also elevated at 117. She was off medication. I have sent in the Lipitor which she should take. She should exercise and take a diet low in polysaturated fats.     Juarez Santiago MD

## 2021-01-04 ENCOUNTER — TELEPHONE (OUTPATIENT)
Dept: FAMILY MEDICINE CLINIC | Age: 56
End: 2021-01-04

## 2021-01-04 DIAGNOSIS — M54.50 CHRONIC MIDLINE LOW BACK PAIN WITHOUT SCIATICA: ICD-10-CM

## 2021-01-04 DIAGNOSIS — G89.29 CHRONIC MIDLINE LOW BACK PAIN WITHOUT SCIATICA: ICD-10-CM

## 2021-01-04 NOTE — TELEPHONE ENCOUNTER
Please see refill request    Patient was las seen on 10-7-2020    Last filled on 10-7-2020   #30 X 3    Upcoming appointment on 2-8-2021    Thank you

## 2021-01-05 DIAGNOSIS — G89.29 CHRONIC MIDLINE LOW BACK PAIN WITHOUT SCIATICA: ICD-10-CM

## 2021-01-05 DIAGNOSIS — M54.50 CHRONIC MIDLINE LOW BACK PAIN WITHOUT SCIATICA: ICD-10-CM

## 2021-01-05 RX ORDER — MELOXICAM 15 MG/1
15 TABLET ORAL DAILY
Qty: 90 TAB | Refills: 1 | Status: SHIPPED | OUTPATIENT
Start: 2021-01-05

## 2021-02-18 ENCOUNTER — OFFICE VISIT (OUTPATIENT)
Dept: FAMILY MEDICINE CLINIC | Age: 56
End: 2021-02-18
Payer: MEDICARE

## 2021-02-18 VITALS
BODY MASS INDEX: 27.82 KG/M2 | TEMPERATURE: 98.7 F | HEART RATE: 70 BPM | RESPIRATION RATE: 20 BRPM | HEIGHT: 65 IN | SYSTOLIC BLOOD PRESSURE: 162 MMHG | WEIGHT: 167 LBS | DIASTOLIC BLOOD PRESSURE: 78 MMHG | OXYGEN SATURATION: 98 %

## 2021-02-18 DIAGNOSIS — E11.9 TYPE 2 DIABETES MELLITUS WITHOUT COMPLICATION, WITHOUT LONG-TERM CURRENT USE OF INSULIN (HCC): Primary | ICD-10-CM

## 2021-02-18 DIAGNOSIS — Z12.11 SCREEN FOR COLON CANCER: ICD-10-CM

## 2021-02-18 DIAGNOSIS — N95.1 MENOPAUSAL SYMPTOMS: ICD-10-CM

## 2021-02-18 DIAGNOSIS — E11.21 TYPE 2 DIABETES WITH NEPHROPATHY (HCC): ICD-10-CM

## 2021-02-18 DIAGNOSIS — Z12.31 ENCOUNTER FOR SCREENING MAMMOGRAM FOR MALIGNANT NEOPLASM OF BREAST: ICD-10-CM

## 2021-02-18 DIAGNOSIS — F17.200 TOBACCO USE DISORDER: ICD-10-CM

## 2021-02-18 DIAGNOSIS — I10 ESSENTIAL HYPERTENSION: ICD-10-CM

## 2021-02-18 DIAGNOSIS — E78.49 OTHER HYPERLIPIDEMIA: ICD-10-CM

## 2021-02-18 LAB
HBA1C MFR BLD HPLC: 6.9 %
MICROALBUMIN UR TEST STR-MCNC: 10 MG/L
MICROALBUMIN/CREAT RATIO POC: <30 MG/G

## 2021-02-18 PROCEDURE — G8427 DOCREV CUR MEDS BY ELIG CLIN: HCPCS | Performed by: FAMILY MEDICINE

## 2021-02-18 PROCEDURE — 99214 OFFICE O/P EST MOD 30 MIN: CPT | Performed by: FAMILY MEDICINE

## 2021-02-18 PROCEDURE — G9899 SCRN MAM PERF RSLTS DOC: HCPCS | Performed by: FAMILY MEDICINE

## 2021-02-18 PROCEDURE — G8754 DIAS BP LESS 90: HCPCS | Performed by: FAMILY MEDICINE

## 2021-02-18 PROCEDURE — 83036 HEMOGLOBIN GLYCOSYLATED A1C: CPT | Performed by: FAMILY MEDICINE

## 2021-02-18 PROCEDURE — 2022F DILAT RTA XM EVC RTNOPTHY: CPT | Performed by: FAMILY MEDICINE

## 2021-02-18 PROCEDURE — G8419 CALC BMI OUT NRM PARAM NOF/U: HCPCS | Performed by: FAMILY MEDICINE

## 2021-02-18 PROCEDURE — 3017F COLORECTAL CA SCREEN DOC REV: CPT | Performed by: FAMILY MEDICINE

## 2021-02-18 PROCEDURE — 3044F HG A1C LEVEL LT 7.0%: CPT | Performed by: FAMILY MEDICINE

## 2021-02-18 PROCEDURE — 82044 UR ALBUMIN SEMIQUANTITATIVE: CPT | Performed by: FAMILY MEDICINE

## 2021-02-18 PROCEDURE — G8510 SCR DEP NEG, NO PLAN REQD: HCPCS | Performed by: FAMILY MEDICINE

## 2021-02-18 PROCEDURE — G8753 SYS BP > OR = 140: HCPCS | Performed by: FAMILY MEDICINE

## 2021-02-18 RX ORDER — AMLODIPINE BESYLATE 10 MG/1
TABLET ORAL
Qty: 90 TAB | Refills: 2 | Status: SHIPPED | OUTPATIENT
Start: 2021-02-18 | End: 2021-10-06 | Stop reason: SDUPTHER

## 2021-02-18 RX ORDER — METFORMIN HYDROCHLORIDE 1000 MG/1
1000 TABLET ORAL 2 TIMES DAILY WITH MEALS
Qty: 180 TAB | Refills: 1 | Status: SHIPPED | OUTPATIENT
Start: 2021-02-18 | End: 2021-10-06 | Stop reason: SDUPTHER

## 2021-02-18 NOTE — PROGRESS NOTES
Vicenta Puckett presents today for   Chief Complaint   Patient presents with    Follow Up Chronic Condition       Is someone accompanying this pt? no    Is the patient using any DME equipment during [address]? no    Depression Screening:  3 most recent PHQ Screens 2/18/2021   Little interest or pleasure in doing things Not at all   Feeling down, depressed, irritable, or hopeless Not at all   Total Score PHQ 2 0   Trouble falling or staying asleep, or sleeping too much Not at all   Feeling tired or having little energy Not at all   Poor appetite, weight loss, or overeating Not at all   Feeling bad about yourself - or that you are a failure or have let yourself or your family down Not at all   Trouble concentrating on things such as school, work, reading, or watching TV Not at all   Moving or speaking so slowly that other people could have noticed; or the opposite being so fidgety that others notice Not at all   Thoughts of being better off dead, or hurting yourself in some way Not at all   PHQ 9 Score 0   How difficult have these problems made it for you to do your work, take care of your home and get along with others Not difficult at all       Learning Assessment:  Learning Assessment 11/12/2015   PRIMARY LEARNER Patient   PRIMARY LANGUAGE ENGLISH   LEARNER PREFERENCE PRIMARY DEMONSTRATION   ANSWERED BY patient   RELATIONSHIP SELF       Abuse Screening:  Abuse Screening Questionnaire 10/7/2020   Do you ever feel afraid of your partner? N   Are you in a relationship with someone who physically or mentally threatens you? N   Is it safe for you to go home? Y       Fall Risk  Fall Risk Assessment, last 12 mths 2/18/2021   Able to walk? Yes   Fall in past 12 months? 0   Do you feel unsteady? 0   Are you worried about falling 0       Health Maintenance reviewed and discussed and ordered per Provider.     Health Maintenance Due   Topic Date Due    COVID-19 Vaccine (1 of 2) 09/20/1981    Shingrix Vaccine Age 50> (1 of 2) 09/20/2015    Breast Cancer Screen Mammogram  03/29/2020    Colorectal Cancer Screening Combo  10/09/2020    MICROALBUMIN Q1  12/05/2020    PAP AKA CERVICAL CYTOLOGY  03/22/2021   . Coordination of Care:  1. Have you been to the ER, urgent care clinic since your last visit? Hospitalized since your last visit? no    2. Have you seen or consulted any other health care providers outside of the [address] [place] [address] since your last visit? Include any pap smears or colon screening no    SHOAIB Chonglizbeth Koenig comes in for f/u care. HTN: Patient has hypertension. She has been taking losartan. Has not taken her amlodipine in weeks. I will send in a refill of medication. I did emphasize the need to take medication as prescribed. She will be on the losartan 100 mg daily and amlodipine 10 mg daily. She denies headache, changes in vision or focal weakness. We will follow-up at next visit. DM2: Patient has type 2 diabetes mellitus. She is on glipizide 10 mg twice a day. Also taking Metformin. She is out of the Metformin. Her last HbA1c was 8.4. Discharge follow-up. I discussed the need to intensify lifestyle and dietary modification. I will recheck HbA1c today. Microalbuminuria: She has microalbuminuria. She is on losartan. Dyslipidemia: Patient has dyslipidemia. She takes atorvastatin. Lipid panel has been stable. We will recheck this at next visit. CVA: Patient has a history of CVA with right sided hemiparesis. Symptoms did improve and she regained some strength in her right upper and lower extremities. Currently stable. She is on aspirin. Postmenopausal symptoms: Patient has a history of menopausal symptoms with hot flashes. She takes Paxil for this. We will continue with the current treatment plan. Tobacco abuse disorder: Patient continues to smoke. Has been strongly advised to quit the habit. She is not ready yet.   She is aware of nicotine replacement therapies and medication to help quit smoking. He is aware of the years of smoking. Health maintenance: Patient will come in to have a Pap smear done. I will place a request for mammogram.  We will give her the FIT stool kit for colon cancer screening. Past Medical History  Past Medical History:   Diagnosis Date    Diabetes ([address]     Hyperlipidemia     Hypertension     Stroke [facility])     2012       Surgical History  No past surgical history on file. Medications  Current Outpatient Medications   Medication Sig Dispense Refill    meloxicam (MOBIC) 15 mg tablet Take 1 Tab by mouth daily. 90 Tab 1    glipiZIDE (GLUCOTROL) 5 mg tablet TAKE 2 TABLETS BY MOUTH TWICE DAILY 360 Tab 1    glucose blood VI test strips (ASCENSIA AUTODISC VI, ONE TOUCH ULTRA TEST VI) strip Check blood glucose 2 x day 200 Strip 3    lancets misc Check blood glucose 2 x day 200 Each 3    atorvastatin (LIPITOR) 80 mg tablet Take 1 Tab by mouth daily. 90 Tab 1    aspirin delayed-release 81 mg tablet Take 1 Tab by mouth daily. 90 Tab 1    losartan (COZAAR) 100 mg tablet TAKE 1 TABLET BY MOUTH DAILY 90 Tab 1    PARoxetine (PAXIL) 10 mg tablet Take 1 Tab by mouth daily. Indications: \"change of life\" signs 30 Tab 3    amLODIPine (NORVASC) 10 mg tablet TAKE 1 TABLET BY MOUTH DAILY 90 Tab 2    metFORMIN (GLUCOPHAGE) 1,000 mg tablet Take 1 Tab by mouth two (2) times daily (with meals). 180 Tab 1       Allergies  No Known Allergies    Family History  No family history on file.     Social History  Social History     Socioeconomic History    Marital status: SINGLE     Spouse name: Not on file    Number of children: Not on file    Years of education: Not on file    Highest education level: Not on file   Occupational History    Not on file   Social Needs    Financial resource strain: Not on file    Food insecurity     Worry: Not on file     Inability: Not on file    Transportation needs     Medical: Not on file     Non-medical: Not on file   Tobacco Use    Smoking status: Current Every Day Smoker     Packs/day: 0.50     Types: Cigarettes    Smokeless tobacco: Never Used   Substance and Sexual Activity    Alcohol use: Yes     Alcohol/week: 0.0 standard drinks     Frequency: Never     Comment: occassionally     Drug use: No    Sexual activity: Never   Lifestyle    Physical activity     Days per week: Not on file     Minutes per session: Not on file    Stress: Not on file   Relationships    Social connections     Talks on phone: Not on file     Gets together: Not on file     Attends Islam service: Not on file     Active member of club or organization: Not on file     Attends meetings of clubs or organizations: Not on file     Relationship status: Not on file    Intimate partner violence     Fear of current or ex partner: Not on file     Emotionally abused: Not on file     Physically abused: Not on file     Forced sexual activity: Not on file   Other Topics Concern    Not on file   Social History Narrative    Not on file       Review of Systems  Review of Systems - Review of all systems is negative except as noted above in the HPI.     Vital Signs  Visit Vitals  BP (!) 162/78 (BP 1 Location: Left upper arm, BP Patient Position: Sitting, BP Cuff Size: Adult long)   Pulse 70   Temp 98.7 °F (37.1 °C) (Temporal)   Resp 20   Ht 5' 5\" (1.651 m)   Wt 167 lb (75.8 kg)   SpO2 98%   BMI 27.79 kg/m²         Physical Exam  Physical Examination: General appearance - oriented to person, place, and time, acyanotic, in no respiratory distress and well hydrated  Mental status - alert, oriented to person, place, and time, affect appropriate to mood  Neck - supple, no significant adenopathy  Lymphatics - no palpable lymphadenopathy, no hepatosplenomegaly  Chest - no tachypnea, retractions or cyanosis  Heart - S1 and S2 normal  Abdomen - no rebound tenderness noted  Back exam - limited range of motion  Neurological - neck supple without rigidity  Musculoskeletal - osteoarthritic changes noted in both hands  Extremities - intact peripheral pulses      Results  Results for orders placed or performed in visit on 02/18/21   AMB POC URINE, MICROALBUMIN, SEMIQUANTITATIVE   Result Value Ref Range    Microalbumin urine (POC) 10 MG/L    Microalbumin/creat ratio (POC) <30 <30 MG/G       ASSESSMENT and PLAN    ICD-10-CM ICD-9-CM    1. Type 2 diabetes mellitus without complication, without long-term current use of insulin (HCC)  E11.9 250.00 AMB POC URINE, MICROALBUMIN, SEMIQUANTITATIVE      AMB POC HEMOGLOBIN A1C      metFORMIN (GLUCOPHAGE) 1,000 mg tablet   2. Type 2 diabetes with nephropathy (HCC)  E11.21 250.40 AMB POC HEMOGLOBIN A1C     583.81    3. Essential hypertension  I10 401.9 amLODIPine (NORVASC) 10 mg tablet   4. Encounter for screening mammogram for malignant neoplasm of breast  Z12.31 V76.12 [facility] MAMMO BI SCREENING INCL CAD   5. Screen for colon cancer  Z12.11 V76.51 OCCULT BLOOD IMMUNOASSAY,DIAGNOSTIC   6. Menopausal symptoms  N95.1 627.2    7. Tobacco use disorder  F17.200 305.1    8. Other hyperlipidemia  E78.49 272.4      lab results and schedule of future lab studies reviewed with patient  reviewed diet, exercise and weight control  very strongly urged to quit smoking to reduce cardiovascular risk  cardiovascular risk and specific lipid/LDL goals reviewed  reviewed medications and side effects in detail  specific diabetic recommendations: low cholesterol diet, weight control and daily exercise discussed, home glucose monitoring emphasized, all medications, side effects and compliance discussed carefully, annual eye examinations at Ophthalmology discussed, glycohemoglobin and other lab monitoring discussed, long term diabetic complications discussed and patient urged in the strongest terms to quit smoking  use of aspirin to prevent MI and TIA's discussed      I have discussed the diagnosis with the patient and the intended plan of care as seen in the above orders.  The patient has received an after-visit summary and questions were answered concerning future plans. I have discussed medication, side effects, and warnings with the patient in detail. The patient verbalized understanding and is in agreement with the plan of care. The patient will contact the office with any additional concerns. Mackenzie De MD    PLEASE NOTE:   This document has been produced using voice recognition software.  Unrecognized errors in transcription may be present

## 2021-05-18 ENCOUNTER — HOSPITAL ENCOUNTER (OUTPATIENT)
Dept: LAB | Age: 56
Discharge: HOME | End: 2021-05-18
Payer: MEDICARE

## 2021-05-18 PROCEDURE — 82274 ASSAY TEST FOR BLOOD FECAL: CPT

## 2021-05-20 ENCOUNTER — OFFICE VISIT (OUTPATIENT)
Dept: FAMILY MEDICINE CLINIC | Age: 56
End: 2021-05-20
Payer: MEDICARE

## 2021-05-20 VITALS
SYSTOLIC BLOOD PRESSURE: 139 MMHG | HEART RATE: 72 BPM | BODY MASS INDEX: 28.82 KG/M2 | DIASTOLIC BLOOD PRESSURE: 76 MMHG | TEMPERATURE: 98.2 F | OXYGEN SATURATION: 99 % | WEIGHT: 173 LBS | RESPIRATION RATE: 16 BRPM | HEIGHT: 65 IN

## 2021-05-20 DIAGNOSIS — L72.3 INFECTED SEBACEOUS CYST: Primary | ICD-10-CM

## 2021-05-20 DIAGNOSIS — I10 ESSENTIAL HYPERTENSION: ICD-10-CM

## 2021-05-20 DIAGNOSIS — E78.49 OTHER HYPERLIPIDEMIA: ICD-10-CM

## 2021-05-20 DIAGNOSIS — L08.9 INFECTED SEBACEOUS CYST: Primary | ICD-10-CM

## 2021-05-20 DIAGNOSIS — F17.200 TOBACCO USE DISORDER: ICD-10-CM

## 2021-05-20 DIAGNOSIS — E11.9 TYPE 2 DIABETES MELLITUS WITHOUT COMPLICATION, WITHOUT LONG-TERM CURRENT USE OF INSULIN (HCC): ICD-10-CM

## 2021-05-20 DIAGNOSIS — N95.1 MENOPAUSAL SYMPTOMS: ICD-10-CM

## 2021-05-20 PROBLEM — H40.013 OPEN ANGLE WITH BORDERLINE FINDINGS, LOW RISK, BILATERAL: Status: ACTIVE | Noted: 2021-05-20

## 2021-05-20 PROBLEM — Z79.84 LONG TERM (CURRENT) USE OF ORAL HYPOGLYCEMIC DRUGS: Status: ACTIVE | Noted: 2021-05-20

## 2021-05-20 PROBLEM — H25.13 AGE-RELATED NUCLEAR CATARACT, BILATERAL: Status: ACTIVE | Noted: 2021-05-20

## 2021-05-20 PROCEDURE — 3044F HG A1C LEVEL LT 7.0%: CPT | Performed by: FAMILY MEDICINE

## 2021-05-20 PROCEDURE — 3017F COLORECTAL CA SCREEN DOC REV: CPT | Performed by: FAMILY MEDICINE

## 2021-05-20 PROCEDURE — 99214 OFFICE O/P EST MOD 30 MIN: CPT | Performed by: FAMILY MEDICINE

## 2021-05-20 PROCEDURE — G8754 DIAS BP LESS 90: HCPCS | Performed by: FAMILY MEDICINE

## 2021-05-20 PROCEDURE — G8752 SYS BP LESS 140: HCPCS | Performed by: FAMILY MEDICINE

## 2021-05-20 PROCEDURE — G8427 DOCREV CUR MEDS BY ELIG CLIN: HCPCS | Performed by: FAMILY MEDICINE

## 2021-05-20 PROCEDURE — 2022F DILAT RTA XM EVC RTNOPTHY: CPT | Performed by: FAMILY MEDICINE

## 2021-05-20 PROCEDURE — G8419 CALC BMI OUT NRM PARAM NOF/U: HCPCS | Performed by: FAMILY MEDICINE

## 2021-05-20 PROCEDURE — G9899 SCRN MAM PERF RSLTS DOC: HCPCS | Performed by: FAMILY MEDICINE

## 2021-05-20 PROCEDURE — G8510 SCR DEP NEG, NO PLAN REQD: HCPCS | Performed by: FAMILY MEDICINE

## 2021-05-20 RX ORDER — CLINDAMYCIN HYDROCHLORIDE 300 MG/1
300 CAPSULE ORAL 3 TIMES DAILY
Qty: 30 CAPSULE | Refills: 0 | Status: SHIPPED | OUTPATIENT
Start: 2021-05-20 | End: 2021-05-30

## 2021-05-20 RX ORDER — LANCETS
EACH MISCELLANEOUS
Qty: 200 EACH | Refills: 3 | Status: SHIPPED | OUTPATIENT
Start: 2021-05-20 | End: 2022-01-06 | Stop reason: SDUPTHER

## 2021-05-20 RX ORDER — INSULIN PUMP SYRINGE, 3 ML
EACH MISCELLANEOUS
Qty: 1 KIT | Refills: 0 | Status: SHIPPED | OUTPATIENT
Start: 2021-05-20 | End: 2022-01-06 | Stop reason: SDUPTHER

## 2021-05-20 NOTE — PROGRESS NOTES
HPI  Neo Koenig comes in for f/u care. Scalp swelling: Patient has a scalp swelling that has been draining since yesterday. Patient states that her son used a sharp needle to altman the swelling and since then it has been draining. She is up-to-date on her tetanus vaccine. It is draining some yellow material.  She does have some occasional pain. This is an infected scalp sebaceous cyst.  It is draining small amount of serosanguineous fluid. No erythema around the cyst.  I will send in clindamycin. She should continue with daily change of dressing. DM2: Patient has type 2 diabetes mellitus. She is on Metformin and glipizide. Blood glucose numbers have been stable. Her last HbA1c was 6.9. She has misplaced her glucose meter. I will send in a prescription for this. She will continue with lifestyle and dietary modification. We will follow-up at next visit. HTN: Patient has hypertension. Blood pressure has been stable. She denies headache, changes in vision or focal weakness. She is on losartan and amlodipine. We will continue with the current treatment plan. Dyslipidemia: Patient has dyslipidemia. She is on Lipitor. We will recheck lipid panel at next visit. She will exercise and take a diet low in polysaturated fats. Menopausal symptoms: Patient has a history of menopausal symptoms. She takes Paxil. This has helped with the symptoms. I will follow-up at next visit. Arthralgia: Patient has generalized arthralgia. She also has low back pain. She is on Mobic daily. Tobacco abuse disorder: Patient continues to smoke. Has been strongly advised to quit the habit. She does not want to take any medication to help with smoking cessation at the moment. Health maintenance: Patient was given information to call and schedule mammogram.  She will come in for Pap smear at next visit. She did bring in her FIT stool kit and we will test this for occult blood to screen for colon cancer.       Past Medical History  Past Medical History:   Diagnosis Date    Diabetes ([facility] [address]     Hyperlipidemia     Hypertension     Stroke [facility])     2012       Surgical History  History reviewed. No pertinent surgical history. Medications  Current Outpatient Medications   Medication Sig Dispense Refill    metFORMIN (GLUCOPHAGE) 1,000 mg tablet Take 1 Tab by mouth two (2) times daily (with meals). 180 Tab 1    amLODIPine (NORVASC) 10 mg tablet TAKE 1 TABLET BY MOUTH DAILY 90 Tab 2    meloxicam (MOBIC) 15 mg tablet Take 1 Tab by mouth daily. 90 Tab 1    glipiZIDE (GLUCOTROL) 5 mg tablet TAKE 2 TABLETS BY MOUTH TWICE DAILY 360 Tab 1    glucose blood VI test strips (ASCENSIA AUTODISC VI, ONE TOUCH ULTRA TEST VI) strip Check blood glucose 2 x day 200 Strip 3    lancets misc Check blood glucose 2 x day 200 Each 3    atorvastatin (LIPITOR) 80 mg tablet Take 1 Tab by mouth daily. 90 Tab 1    aspirin delayed-release 81 mg tablet Take 1 Tab by mouth daily. 90 Tab 1    losartan (COZAAR) 100 mg tablet TAKE 1 TABLET BY MOUTH DAILY 90 Tab 1    PARoxetine (PAXIL) 10 mg tablet Take 1 Tab by mouth daily. Indications: \"change of life\" signs 30 Tab 3       Allergies  No Known Allergies    Family History  History reviewed. No pertinent family history. Social History  Social History     Socioeconomic History    Marital status: SINGLE     Spouse name: Not on file    Number of children: Not on file    Years of education: Not on file    Highest education level: Not on file   Occupational History    Not on file   Tobacco Use    Smoking status: Current Every Day Smoker     Packs/day: 0.50     Types: Cigarettes    Smokeless tobacco: Never Used   Vaping Use    Vaping Use: Never used   Substance and Sexual Activity    Alcohol use:  Yes     Alcohol/week: 0.0 standard drinks     Comment: occassionally     Drug use: No    Sexual activity: Never   Other Topics Concern    Not on file   Social History Narrative    Not on file Social Determinants of Health     Financial Resource Strain:     Difficulty of Paying Living Expenses:    Food Insecurity:     Worried About Running Out of Food in the Last Year:     [address] in the Last Year:    Transportation Needs:     Lack of Transportation (Medical):  Lack of Transportation (Non-Medical):    Physical Activity:     Days of Exercise per Week:     Minutes of Exercise per Session:    Stress:     Feeling of Stress :    Social Connections:     Frequency of Communication with Friends and Family:     Frequency of Social Gatherings with Friends and Family:     Attends Bahai Services:     Active Member of Clubs or Organizations:     Attends Club or Organization Meetings:     Marital Status:    Intimate Partner Violence:     Fear of Current or Ex-Partner:     Emotionally Abused:     Physically Abused:     Sexually Abused:        Review of Systems  Review of Systems - Review of all systems is negative except as noted above in the HPI.     Vital Signs  Visit Vitals  /76 (BP 1 Location: Left upper arm, BP Patient Position: Sitting, BP Cuff Size: Adult)   Pulse 72   Temp 98.2 °F (36.8 °C) (Oral)   Resp 16   Ht 5' 5\" (1.651 m)   Wt 173 lb (78.5 kg)   SpO2 99%   BMI 28.79 kg/m²         Physical Exam  Physical Examination: General appearance - alert, well appearing, and in no distress, oriented to person, place, and time and acyanotic, in no respiratory distress  Mental status - alert, oriented to person, place, and time, affect appropriate to mood  Neck - supple, no significant adenopathy  Lymphatics - no palpable lymphadenopathy  Chest - no tachypnea, retractions or cyanosis  Heart - S1 and S2 normal  Back exam - limited range of motion  Neurological - abnormal neurological exam unchanged from prior examinations  Musculoskeletal - osteoarthritic changes noted in both hands  Extremities - intact peripheral pulses  Skin -scalp cyst that is draining serosanguineous fluid, no blood. Serosanguineous material was completely expressed out. Results  Results for orders placed or performed in visit on 02/18/21   AMB POC URINE, MICROALBUMIN, SEMIQUANTITATIVE   Result Value Ref Range    Microalbumin urine (POC) 10 MG/L    Microalbumin/creat ratio (POC) <30 <30 MG/G   AMB POC HEMOGLOBIN A1C   Result Value Ref Range    Hemoglobin A1c (POC) 6.9 %       ASSESSMENT and PLAN    ICD-10-CM ICD-9-CM    1. Infected sebaceous cyst  L72.3 706.2 clindamycin (CLEOCIN) 300 mg capsule    L08.9     2. Type 2 diabetes mellitus without complication, without long-term current use of insulin (HCC)  E11.9 250.00 Blood-Glucose Meter monitoring kit      glucose blood VI test strips (ASCENSIA AUTODISC VI, ONE TOUCH ULTRA TEST VI) strip      lancets misc   3. Essential hypertension  I10 401.9    4. Menopausal symptoms  N95.1 627.2    5. Other hyperlipidemia  E78.49 272.4    6. Tobacco use disorder  F17.200 305.1      lab results and schedule of future lab studies reviewed with patient  reviewed diet, exercise and weight control  cardiovascular risk and specific lipid/LDL goals reviewed  reviewed medications and side effects in detail  specific diabetic recommendations: low cholesterol diet, weight control and daily exercise discussed, home glucose monitoring emphasized, all medications, side effects and compliance discussed carefully, annual eye examinations at Ophthalmology discussed, glycohemoglobin and other lab monitoring discussed and long term diabetic complications discussed  use of aspirin to prevent MI and TIA's discussed      I have discussed the diagnosis with the patient and the intended plan of care as seen in the above orders. The patient has received an after-visit summary and questions were answered concerning future plans. I have discussed medication, side effects, and warnings with the patient in detail. The patient verbalized understanding and is in agreement with the plan of care.  The patient will contact the office with any additional concerns. Car Reeves MD    PLEASE NOTE:   This document has been produced using voice recognition software.  Unrecognized errors in transcription may be present

## 2021-05-20 NOTE — PROGRESS NOTES
Chief Complaint   Patient presents with    Well Woman    Cyst     on head     1. Have you been to the ER, urgent care clinic since your last visit? Hospitalized since your last visit? No    2. Have you seen or consulted any other health care providers outside of the [address] [place] [address] since your last visit? Include any pap smears or colon screening.  No

## 2021-05-22 LAB — HEMOCCULT STL QL IA: NEGATIVE

## 2021-07-18 DIAGNOSIS — I10 ESSENTIAL HYPERTENSION: ICD-10-CM

## 2021-07-19 RX ORDER — ATORVASTATIN CALCIUM 80 MG/1
TABLET, FILM COATED ORAL
Qty: 90 TABLET | Refills: 1 | Status: SHIPPED | OUTPATIENT
Start: 2021-07-19 | End: 2021-10-06 | Stop reason: SDUPTHER

## 2021-07-25 ENCOUNTER — HOSPITAL ENCOUNTER (OUTPATIENT)
Dept: LAB | Age: 56
Discharge: HOME | End: 2021-07-25
Payer: MEDICARE

## 2021-07-25 DIAGNOSIS — Z12.11 SCREEN FOR COLON CANCER: ICD-10-CM

## 2021-07-25 PROCEDURE — 82274 ASSAY TEST FOR BLOOD FECAL: CPT

## 2021-07-27 ENCOUNTER — OFFICE VISIT (OUTPATIENT)
Dept: FAMILY MEDICINE CLINIC | Age: 56
End: 2021-07-27
Payer: MEDICARE

## 2021-07-27 ENCOUNTER — HOSPITAL ENCOUNTER (OUTPATIENT)
Dept: LAB | Age: 56
Discharge: HOME | End: 2021-07-27
Payer: MEDICARE

## 2021-07-27 VITALS
HEIGHT: 65 IN | BODY MASS INDEX: 28.49 KG/M2 | OXYGEN SATURATION: 99 % | DIASTOLIC BLOOD PRESSURE: 83 MMHG | RESPIRATION RATE: 16 BRPM | HEART RATE: 72 BPM | TEMPERATURE: 98.3 F | WEIGHT: 171 LBS | SYSTOLIC BLOOD PRESSURE: 136 MMHG

## 2021-07-27 DIAGNOSIS — I10 ESSENTIAL HYPERTENSION: ICD-10-CM

## 2021-07-27 DIAGNOSIS — E11.9 TYPE 2 DIABETES MELLITUS WITHOUT COMPLICATION, WITHOUT LONG-TERM CURRENT USE OF INSULIN (HCC): Primary | ICD-10-CM

## 2021-07-27 DIAGNOSIS — Z12.4 SCREENING FOR CERVICAL CANCER: ICD-10-CM

## 2021-07-27 DIAGNOSIS — E78.49 OTHER HYPERLIPIDEMIA: ICD-10-CM

## 2021-07-27 DIAGNOSIS — N95.1 MENOPAUSAL SYMPTOMS: ICD-10-CM

## 2021-07-27 PROCEDURE — G8752 SYS BP LESS 140: HCPCS | Performed by: FAMILY MEDICINE

## 2021-07-27 PROCEDURE — 2022F DILAT RTA XM EVC RTNOPTHY: CPT | Performed by: FAMILY MEDICINE

## 2021-07-27 PROCEDURE — G8510 SCR DEP NEG, NO PLAN REQD: HCPCS | Performed by: FAMILY MEDICINE

## 2021-07-27 PROCEDURE — 3017F COLORECTAL CA SCREEN DOC REV: CPT | Performed by: FAMILY MEDICINE

## 2021-07-27 PROCEDURE — 3044F HG A1C LEVEL LT 7.0%: CPT | Performed by: FAMILY MEDICINE

## 2021-07-27 PROCEDURE — G8427 DOCREV CUR MEDS BY ELIG CLIN: HCPCS | Performed by: FAMILY MEDICINE

## 2021-07-27 PROCEDURE — G8419 CALC BMI OUT NRM PARAM NOF/U: HCPCS | Performed by: FAMILY MEDICINE

## 2021-07-27 PROCEDURE — 88175 CYTOPATH C/V AUTO FLUID REDO: CPT

## 2021-07-27 PROCEDURE — G9899 SCRN MAM PERF RSLTS DOC: HCPCS | Performed by: FAMILY MEDICINE

## 2021-07-27 PROCEDURE — 99214 OFFICE O/P EST MOD 30 MIN: CPT | Performed by: FAMILY MEDICINE

## 2021-07-27 PROCEDURE — G8754 DIAS BP LESS 90: HCPCS | Performed by: FAMILY MEDICINE

## 2021-07-27 NOTE — PROGRESS NOTES
Chief Complaint   Patient presents with    Follow Up Chronic Condition     DM,HTN    Well Woman     1. Have you been to the ER, urgent care clinic since your last visit? Hospitalized since your last visit? No    2. Have you seen or consulted any other health care providers outside of the [address] [place] [address] since your last visit? Include any pap smears or colon screening. No     HPI  Malena Chacko comes in for follow-up care. Diabetes mellitus type 2: Patient has type 2 diabetes mellitus. She is on glipizide and Metformin. States that her blood glucose numbers have been stable. Her last HbA1c was 6.9. Continue current treatment plan. I will send in a refill of medications. She needs a refill of test strips. Prescription is sent in. Hypertension: Patient has hypertension. Blood pressure is stable. Denies headache, changes in vision or focal weakness. She takes losartan and amlodipine. We will continue with these medications. Recheck labs at next visit. Mood disorder: Patient has mood disorder with mood swings due to menopause. She is on Paxil. Stable on the medication. We will continue current treatment plan. Cervical cancer screening: Patient will get cervical cancer screening today. Pap smear was done with good result. We will follow-up and let her know of the pathological results. Past Medical History  Past Medical History:   Diagnosis Date    Diabetes ([facility] [address]     Hyperlipidemia     Hypertension     Stroke [facility])     2012       Surgical History  No past surgical history on file.      Medications  Current Outpatient Medications   Medication Sig Dispense Refill    glucose blood VI test strips (ASCENSIA AUTODISC VI, ONE TOUCH ULTRA TEST VI) strip Check blood glucose 2 x day 200 Strip 3    atorvastatin (LIPITOR) 80 mg tablet TAKE 1 TABLET BY MOUTH DAILY 90 Tablet 1    Blood-Glucose Meter monitoring kit Check blood glucose 2 x day 1 Kit 0    lancets misc Check blood glucose 2 x day 200 Each 3    metFORMIN (GLUCOPHAGE) 1,000 mg tablet Take 1 Tab by mouth two (2) times daily (with meals). 180 Tab 1    amLODIPine (NORVASC) 10 mg tablet TAKE 1 TABLET BY MOUTH DAILY 90 Tab 2    meloxicam (MOBIC) 15 mg tablet Take 1 Tab by mouth daily. 90 Tab 1    glipiZIDE (GLUCOTROL) 5 mg tablet TAKE 2 TABLETS BY MOUTH TWICE DAILY 360 Tab 1    aspirin delayed-release 81 mg tablet Take 1 Tab by mouth daily. 90 Tab 1    losartan (COZAAR) 100 mg tablet TAKE 1 TABLET BY MOUTH DAILY 90 Tab 1    PARoxetine (PAXIL) 10 mg tablet Take 1 Tab by mouth daily. Indications: \"change of life\" signs (Patient not taking: Reported on 7/27/2021) 30 Tab 3       Allergies  No Known Allergies    Family History  No family history on file. Social History  Social History     Socioeconomic History    Marital status: SINGLE     Spouse name: Not on file    Number of children: Not on file    Years of education: Not on file    Highest education level: Not on file   Occupational History    Not on file   Tobacco Use    Smoking status: Current Every Day Smoker     Packs/day: 0.50     Types: Cigarettes    Smokeless tobacco: Never Used   Vaping Use    Vaping Use: Never used   Substance and Sexual Activity    Alcohol use: Yes     Alcohol/week: 0.0 standard drinks     Comment: occassionally     Drug use: No    Sexual activity: Never   Other Topics Concern    Not on file   Social History Narrative    Not on file     Social Determinants of Health     Financial Resource Strain:     Difficulty of Paying Living Expenses:    Food Insecurity:     Worried About Running Out of Food in the Last Year:     [address] in the Last Year:    Transportation Needs:     Lack of Transportation (Medical):      Lack of Transportation (Non-Medical):    Physical Activity:     Days of Exercise per Week:     Minutes of Exercise per Session:    Stress:     Feeling of Stress :    Social Connections:     Frequency of Communication with Friends and Family:     Frequency of Social Gatherings with Friends and Family:     Attends [employer] Services:     Active Member of Clubs or Organizations:     Attends Club or Organization Meetings:     Marital Status:    Intimate Partner Violence:     Fear of Current or Ex-Partner:     Emotionally Abused:     Physically Abused:     Sexually Abused:        Review of Systems  Review of Systems - Review of all systems is negative except as noted above in the HPI. Vital Signs  Visit Vitals  BP (!) 149/72 (BP 1 Location: Left upper arm, BP Patient Position: Sitting, BP Cuff Size: Adult)   Pulse 72   Temp 98.3 °F (36.8 °C) (Oral)   Resp 16   Ht 5' 5\" (1.651 m)   Wt 171 lb (77.6 kg)   SpO2 99%   BMI 28.46 kg/m²         Physical Exam  Physical Examination: General appearance - alert, well appearing, and in no distress and oriented to person, place, and time  Mental status - affect appropriate to mood  Chest - clear to auscultation, no wheezes, rales or rhonchi, symmetric air entry  Heart - S1 and S2 normal  Abdomen - no rebound tenderness noted  Pelvic - normal external genitalia, vulva, vagina, cervix, uterus and adnexa, PAP: Pap smear done today, exam chaperoned by Michael Bailey LPN. Back exam - limited range of motion  Neurological - motor and sensory grossly normal bilaterally  Musculoskeletal - osteoarthritic changes noted in both hands  Extremities - intact peripheral pulses      Results  Results for orders placed or performed during the hospital encounter of 05/18/21   OCCULT BLOOD IMMUNOASSAY,DIAGNOSTIC   Result Value Ref Range    Occult blood fecal, by IA Negative Negative         ASSESSMENT and PLAN    ICD-10-CM ICD-9-CM    1. Type 2 diabetes mellitus without complication, without long-term current use of insulin (HCC)  E11.9 250.00 glucose blood VI test strips (ASCENSIA AUTODISC VI, ONE TOUCH ULTRA TEST VI) strip   2. Essential hypertension  I10 401.9    3.  Screening for cervical cancer  Z12.4 V76.2 PAP IG, CT-NG-TV, APTIMA HPV AND [address] 00/37,16([id number])   4. Menopausal symptoms  N95.1 627.2    5. Other hyperlipidemia  E78.49 272.4      lab results and schedule of future lab studies reviewed with patient  reviewed diet, exercise and weight control  cardiovascular risk and specific lipid/LDL goals reviewed  reviewed medications and side effects in detail  specific diabetic recommendations: low cholesterol diet, weight control and daily exercise discussed, home glucose monitoring emphasized, all medications, side effects and compliance discussed carefully, annual eye examinations at Ophthalmology discussed, glycohemoglobin and other lab monitoring discussed and long term diabetic complications discussed      I have discussed the diagnosis with the patient and the intended plan of care as seen in the above orders. The patient has received an after-visit summary and questions were answered concerning future plans. I have discussed medication, side effects, and warnings with the patient in detail. The patient verbalized understanding and is in agreement with the plan of care. The patient will contact the office with any additional concerns. Tamia Red MD    PLEASE NOTE:   This document has been produced using voice recognition software.  Unrecognized errors in transcription may be present

## 2021-07-29 LAB — HEMOCCULT STL QL IA: NEGATIVE

## 2021-08-03 LAB
C TRACH RRNA CVX QL NAA+PROBE: NEGATIVE
CYTOLOGIST CVX/VAG CYTO: NORMAL
CYTOLOGY CVX/VAG DOC THIN PREP: NORMAL
HPV APTIMA: NEGATIVE
Lab: NORMAL
N GONORRHOEA RRNA CVX QL NAA+PROBE: NEGATIVE
PATH REPORT.FINAL DX SPEC: NORMAL
STAT OF ADQ CVX/VAG CYTO-IMP: NORMAL
T VAGINALIS RRNA SPEC QL NAA+PROBE: NEGATIVE

## 2021-08-05 NOTE — PROGRESS NOTES
Spoke with patient. Patient was given lab resuts. Patient stated understanding. Patient didn't have any questions or concerns.

## 2021-10-06 ENCOUNTER — HOSPITAL ENCOUNTER (OUTPATIENT)
Dept: LAB | Age: 56
Discharge: HOME | End: 2021-10-06
Payer: MEDICARE

## 2021-10-06 ENCOUNTER — OFFICE VISIT (OUTPATIENT)
Dept: FAMILY MEDICINE CLINIC | Age: 56
End: 2021-10-06
Payer: MEDICARE

## 2021-10-06 VITALS
TEMPERATURE: 96.5 F | DIASTOLIC BLOOD PRESSURE: 78 MMHG | RESPIRATION RATE: 18 BRPM | HEART RATE: 82 BPM | HEIGHT: 65 IN | BODY MASS INDEX: 29.16 KG/M2 | SYSTOLIC BLOOD PRESSURE: 138 MMHG | WEIGHT: 175 LBS | OXYGEN SATURATION: 98 %

## 2021-10-06 DIAGNOSIS — L98.9 SCALP LESION: ICD-10-CM

## 2021-10-06 DIAGNOSIS — E78.49 OTHER HYPERLIPIDEMIA: ICD-10-CM

## 2021-10-06 DIAGNOSIS — I10 ESSENTIAL HYPERTENSION: ICD-10-CM

## 2021-10-06 DIAGNOSIS — E11.9 COMPREHENSIVE DIABETIC FOOT EXAMINATION, TYPE 2 DM, ENCOUNTER FOR (HCC): ICD-10-CM

## 2021-10-06 DIAGNOSIS — E11.9 TYPE 2 DIABETES MELLITUS WITHOUT COMPLICATION, WITHOUT LONG-TERM CURRENT USE OF INSULIN (HCC): ICD-10-CM

## 2021-10-06 DIAGNOSIS — Z71.89 ADVANCED DIRECTIVES, COUNSELING/DISCUSSION: ICD-10-CM

## 2021-10-06 DIAGNOSIS — Z13.31 SCREENING FOR DEPRESSION: ICD-10-CM

## 2021-10-06 DIAGNOSIS — Z00.00 MEDICARE ANNUAL WELLNESS VISIT, SUBSEQUENT: ICD-10-CM

## 2021-10-06 DIAGNOSIS — E11.65 TYPE 2 DIABETES MELLITUS WITH HYPERGLYCEMIA, WITHOUT LONG-TERM CURRENT USE OF INSULIN (HCC): ICD-10-CM

## 2021-10-06 DIAGNOSIS — E11.9 TYPE 2 DIABETES MELLITUS WITHOUT COMPLICATION, WITHOUT LONG-TERM CURRENT USE OF INSULIN (HCC): Primary | ICD-10-CM

## 2021-10-06 LAB
ALBUMIN SERPL-MCNC: 3.5 G/DL (ref 3.4–5)
ALBUMIN/GLOB SERPL: 0.9 {RATIO} (ref 0.8–1.7)
ALP SERPL-CCNC: 138 U/L (ref 45–117)
ALT SERPL-CCNC: 18 U/L (ref 13–56)
ANION GAP SERPL CALC-SCNC: 5 MMOL/L (ref 3–18)
AST SERPL-CCNC: 12 U/L (ref 10–38)
BASOPHILS # BLD: 0.1 K/UL (ref 0–0.1)
BASOPHILS NFR BLD: 1 % (ref 0–2)
BILIRUB SERPL-MCNC: 0.4 MG/DL (ref 0.2–1)
BUN SERPL-MCNC: 10 MG/DL (ref 7–18)
BUN/CREAT SERPL: 11 (ref 12–20)
CALCIUM SERPL-MCNC: 9.7 MG/DL (ref 8.5–10.1)
CHLORIDE SERPL-SCNC: 107 MMOL/L (ref 100–111)
CHOLEST SERPL-MCNC: 143 MG/DL
CO2 SERPL-SCNC: 30 MMOL/L (ref 21–32)
CREAT SERPL-MCNC: 0.91 MG/DL (ref 0.6–1.3)
DIFFERENTIAL METHOD BLD: ABNORMAL
EOSINOPHIL # BLD: 0.1 K/UL (ref 0–0.4)
EOSINOPHIL NFR BLD: 1 % (ref 0–5)
ERYTHROCYTE [DISTWIDTH] IN BLOOD BY AUTOMATED COUNT: 13.2 % (ref 11.6–14.5)
EST. AVERAGE GLUCOSE BLD GHB EST-MCNC: 189 MG/DL
GLOBULIN SER CALC-MCNC: 3.7 G/DL (ref 2–4)
GLUCOSE SERPL-MCNC: 196 MG/DL (ref 74–99)
HBA1C MFR BLD: 8.2 % (ref 4.2–5.6)
HCT VFR BLD AUTO: 42.1 % (ref 35–45)
HDLC SERPL-MCNC: 62 MG/DL (ref 40–60)
HDLC SERPL: 2.3 {RATIO} (ref 0–5)
HGB BLD-MCNC: 13.5 G/DL (ref 12–16)
LDLC SERPL CALC-MCNC: 58.8 MG/DL (ref 0–100)
LIPID PROFILE,FLP: ABNORMAL
LYMPHOCYTES # BLD: 3.3 K/UL (ref 0.9–3.6)
LYMPHOCYTES NFR BLD: 30 % (ref 21–52)
MCH RBC QN AUTO: 28.2 PG (ref 24–34)
MCHC RBC AUTO-ENTMCNC: 32.1 G/DL (ref 31–37)
MCV RBC AUTO: 88.1 FL (ref 78–100)
MONOCYTES # BLD: 0.9 K/UL (ref 0.05–1.2)
MONOCYTES NFR BLD: 8 % (ref 3–10)
NEUTS SEG # BLD: 6.8 K/UL (ref 1.8–8)
NEUTS SEG NFR BLD: 61 % (ref 40–73)
PLATELET # BLD AUTO: 245 K/UL (ref 135–420)
PMV BLD AUTO: 12.2 FL (ref 9.2–11.8)
POTASSIUM SERPL-SCNC: 4.4 MMOL/L (ref 3.5–5.5)
PROT SERPL-MCNC: 7.2 G/DL (ref 6.4–8.2)
RBC # BLD AUTO: 4.78 M/UL (ref 4.2–5.3)
SODIUM SERPL-SCNC: 142 MMOL/L (ref 136–145)
TRIGL SERPL-MCNC: 111 MG/DL (ref ?–150)
VLDLC SERPL CALC-MCNC: 22.2 MG/DL
WBC # BLD AUTO: 11.1 K/UL (ref 4.6–13.2)

## 2021-10-06 PROCEDURE — 3044F HG A1C LEVEL LT 7.0%: CPT | Performed by: FAMILY MEDICINE

## 2021-10-06 PROCEDURE — 80053 COMPREHEN METABOLIC PANEL: CPT

## 2021-10-06 PROCEDURE — G8754 DIAS BP LESS 90: HCPCS | Performed by: FAMILY MEDICINE

## 2021-10-06 PROCEDURE — G8427 DOCREV CUR MEDS BY ELIG CLIN: HCPCS | Performed by: FAMILY MEDICINE

## 2021-10-06 PROCEDURE — 80061 LIPID PANEL: CPT

## 2021-10-06 PROCEDURE — G8752 SYS BP LESS 140: HCPCS | Performed by: FAMILY MEDICINE

## 2021-10-06 PROCEDURE — 83036 HEMOGLOBIN GLYCOSYLATED A1C: CPT

## 2021-10-06 PROCEDURE — 3017F COLORECTAL CA SCREEN DOC REV: CPT | Performed by: FAMILY MEDICINE

## 2021-10-06 PROCEDURE — G8510 SCR DEP NEG, NO PLAN REQD: HCPCS | Performed by: FAMILY MEDICINE

## 2021-10-06 PROCEDURE — 2022F DILAT RTA XM EVC RTNOPTHY: CPT | Performed by: FAMILY MEDICINE

## 2021-10-06 PROCEDURE — G9899 SCRN MAM PERF RSLTS DOC: HCPCS | Performed by: FAMILY MEDICINE

## 2021-10-06 PROCEDURE — 99214 OFFICE O/P EST MOD 30 MIN: CPT | Performed by: FAMILY MEDICINE

## 2021-10-06 PROCEDURE — G8419 CALC BMI OUT NRM PARAM NOF/U: HCPCS | Performed by: FAMILY MEDICINE

## 2021-10-06 PROCEDURE — 85025 COMPLETE CBC W/AUTO DIFF WBC: CPT

## 2021-10-06 PROCEDURE — 36415 COLL VENOUS BLD VENIPUNCTURE: CPT

## 2021-10-06 PROCEDURE — G0439 PPPS, SUBSEQ VISIT: HCPCS | Performed by: FAMILY MEDICINE

## 2021-10-06 RX ORDER — ATORVASTATIN CALCIUM 80 MG/1
80 TABLET, FILM COATED ORAL DAILY
Qty: 90 TABLET | Refills: 1 | Status: SHIPPED | OUTPATIENT
Start: 2021-10-06

## 2021-10-06 RX ORDER — GLIPIZIDE 5 MG/1
TABLET ORAL
Qty: 360 TABLET | Refills: 1 | Status: SHIPPED | OUTPATIENT
Start: 2021-10-06

## 2021-10-06 RX ORDER — METFORMIN HYDROCHLORIDE 1000 MG/1
1000 TABLET ORAL 2 TIMES DAILY WITH MEALS
Qty: 180 TABLET | Refills: 1 | Status: SHIPPED | OUTPATIENT
Start: 2021-10-06

## 2021-10-06 RX ORDER — AMLODIPINE BESYLATE 10 MG/1
TABLET ORAL
Qty: 90 TABLET | Refills: 2 | Status: SHIPPED | OUTPATIENT
Start: 2021-10-06

## 2021-10-06 RX ORDER — LOSARTAN POTASSIUM 100 MG/1
TABLET ORAL
Qty: 90 TABLET | Refills: 1 | Status: SHIPPED | OUTPATIENT
Start: 2021-10-06 | End: 2022-04-11

## 2021-10-06 NOTE — PATIENT INSTRUCTIONS
Medicare Wellness Visit, Female     The best way to live healthy is to have a lifestyle where you eat a well-balanced diet, exercise regularly, limit alcohol use, and quit all forms of tobacco/nicotine, if applicable. Regular preventive services are another way to keep healthy. Preventive services (vaccines, screening tests, monitoring & exams) can help personalize your care plan, which helps you manage your own care. Screening tests can find health problems at the earliest stages, when they are easiest to treat. [facility] follows the current, evidence-based guidelines published by the [facility] ([facility]) when recommending preventive services for our patients. Because we follow these guidelines, sometimes recommendations change over time as research supports it. (For example, mammograms used to be recommended annually. Even though Medicare will still pay for an annual mammogram, the newer guidelines recommend a mammogram every two years for women of average risk). Of course, you and your doctor may decide to screen more often for some diseases, based on your risk and your co-morbidities (chronic disease you are already diagnosed with). Preventive services for you include:  - Medicare offers their members a free annual wellness visit, which is time for you and your primary care provider to discuss and plan for your preventive service needs. Take advantage of this benefit every year!  -All adults over the age of 72 should receive the recommended pneumonia vaccines. Current USPSTF guidelines recommend a series of two vaccines for the best pneumonia protection.   -All adults should have a flu vaccine yearly and a tetanus vaccine every 10 years.   -All adults age 48 and older should receive the shingles vaccines (series of two vaccines).       -All adults age 38-68 who are overweight should have a diabetes screening test once every three years.   -All adults born between [birth year] and 1965 should be screened once for Hepatitis C.  -Other screening tests and preventive services for persons with diabetes include: an eye exam to screen for diabetic retinopathy, a kidney function test, a foot exam, and stricter control over your cholesterol.   -Cardiovascular screening for adults with routine risk involves an electrocardiogram (ECG) at intervals determined by your doctor.   -Colorectal cancer screenings should be done for adults age 54-65 with no increased risk factors for colorectal cancer. There are a number of acceptable methods of screening for this type of cancer. Each test has its own benefits and drawbacks. Discuss with your doctor what is most appropriate for you during your annual wellness visit. The different tests include: colonoscopy (considered the best screening method), a fecal occult blood test, a fecal DNA test, and sigmoidoscopy.    -A bone mass density test is recommended when a woman turns 65 to screen for osteoporosis. This test is only recommended one time, as a screening. Some providers will use this same test as a disease monitoring tool if you already have osteoporosis. -Breast cancer screenings are recommended every other year for women of normal risk, age 54-69.  -Cervical cancer screenings for women over age 72 are only recommended with certain risk factors.      Here is a list of your current Health Maintenance items (your personalized list of preventive services) with a due date:  Health Maintenance Due   Topic Date Due    Shingles Vaccine (1 of 2) Never done    Mammogram  03/29/2020    Yearly Flu Vaccine (1) 09/01/2021    Diabetic Foot Care  10/07/2021    Annual Well Visit  10/08/2021    Cholesterol Test   10/09/2021         Medicare Wellness Visit, Female     The best way to live healthy is to have a lifestyle where you eat a well-balanced diet, exercise regularly, limit alcohol use, and quit all forms of tobacco/nicotine, if applicable. Regular preventive services are another way to keep healthy. Preventive services (vaccines, screening tests, monitoring & exams) can help personalize your care plan, which helps you manage your own care. Screening tests can find health problems at the earliest stages, when they are easiest to treat. [facility] follows the current, evidence-based guidelines published by the [facility] ([facility]) when recommending preventive services for our patients. Because we follow these guidelines, sometimes recommendations change over time as research supports it. (For example, mammograms used to be recommended annually. Even though Medicare will still pay for an annual mammogram, the newer guidelines recommend a mammogram every two years for women of average risk). Of course, you and your doctor may decide to screen more often for some diseases, based on your risk and your co-morbidities (chronic disease you are already diagnosed with). Preventive services for you include:  - Medicare offers their members a free annual wellness visit, which is time for you and your primary care provider to discuss and plan for your preventive service needs. Take advantage of this benefit every year!  -All adults over the age of 72 should receive the recommended pneumonia vaccines. Current USPSTF guidelines recommend a series of two vaccines for the best pneumonia protection.   -All adults should have a flu vaccine yearly and a tetanus vaccine every 10 years.   -All adults age 48 and older should receive the shingles vaccines (series of two vaccines).       -All adults age 38-68 who are overweight should have a diabetes screening test once every three years.   -All adults born between [birth year] and 1965 should be screened once for Hepatitis C.  -Other screening tests and preventive services for persons with diabetes include: an eye exam to screen for diabetic retinopathy, a kidney function test, a foot exam, and stricter control over your cholesterol.   -Cardiovascular screening for adults with routine risk involves an electrocardiogram (ECG) at intervals determined by your doctor.   -Colorectal cancer screenings should be done for adults age 54-65 with no increased risk factors for colorectal cancer. There are a number of acceptable methods of screening for this type of cancer. Each test has its own benefits and drawbacks. Discuss with your doctor what is most appropriate for you during your annual wellness visit. The different tests include: colonoscopy (considered the best screening method), a fecal occult blood test, a fecal DNA test, and sigmoidoscopy.    -A bone mass density test is recommended when a woman turns 65 to screen for osteoporosis. This test is only recommended one time, as a screening. Some providers will use this same test as a disease monitoring tool if you already have osteoporosis. -Breast cancer screenings are recommended every other year for women of normal risk, age 54-69.  -Cervical cancer screenings for women over age 72 are only recommended with certain risk factors.      Here is a list of your current Health Maintenance items (your personalized list of preventive services) with a due date:  Health Maintenance Due   Topic Date Due    Mammogram  03/29/2020    Cholesterol Test   10/09/2021

## 2021-10-06 NOTE — ACP (ADVANCE CARE PLANNING)
Advance Care Planning     Advance Care Planning (ACP) Physician/NP/PA Conversation      Date of Conversation: 10/6/2021  Conducted with: Patient with Decision Making Capacity    Healthcare Decision Maker:     Primary Decision Maker (Active): Mazin Stanford - Antonio - [phone]  Click here to complete [place] including selection of the Healthcare Decision Maker Relationship (ie \"Primary\")      Today we documented Decision Maker(s). The patient will provide ACP documents. Care Preferences:    Hospitalization: \"If your health worsens and it becomes clear that your chance of recovery is unlikely, what would be your preference regarding hospitalization? \"  The patient would prefer hospitalization. Ventilation: \"If you were unable to breathe on your own and your chance of recovery was unlikely, what would be your preference about the use of a ventilator (breathing machine) if it was available to you? \"   The patient would desire the use of a ventilator. Resuscitation: \"In the event your heart stopped as a result of an underlying serious health condition, would you want attempts to be made to restart your heart, or would you prefer a natural death? \"   Yes, attempt to resuscitate.     Additional topics discussed: treatment goals, ventilation preferences, hospitalization preferences and resuscitation preferences    Conversation Outcomes / Follow-Up Plan:   ACP in process - completing/providing documents   Reviewed DNR/DNI and patient elects Full Code (Attempt Resuscitation)     Length of Voluntary ACP Conversation in minutes:  <16 minutes (Non-Billable)    Hesed Hyland Riedel, MD

## 2021-10-06 NOTE — PROGRESS NOTES
SHOAIB  Osvaldo Dill comes in for f/u care. HTN: Patient has hypertension. She is on losartan and amlodipine. She has not taken her amlodipine in a while. Blood pressure is occasionally elevated. I did advise her to take medication as prescribed. I will send in a refill of medications. She needs to have her lab work done. She will come back in for this. Denies headache, changes in vision or focal weakness. DM2: Patient has type 2 diabetes mellitus. She is on metformin and glipizide. States that her blood glucose numbers have been stable. Her last HbA1c was 6.9. We will recheck labs. I did a foot exam that is stable. Dyslipidemia: Patient has dyslipidemia. She is on lipitor. She tolerates the medications. We will recheck labs. She will continue to exercise and take a diet low in polysaturated fats. Menopausal symptoms: Patient has a history of menopausal symptoms. States that she is not taking paxil. Feels that her symptoms are stable. She will continue with supportive care. Scalp lesion: Patient has a chronic scalp lesion. This is in an area where there is hair loss and swelling. No darkening of the skin. She would like to be referred to the dermatologist.  Suspect tinea capitis. I will refer to dermatologist for evaluation and advice. May need to have a biopsy done. HM: Patient declines flu and shingles vaccine, needs to have mammogram.      Past Medical History  Past Medical History:   Diagnosis Date    Diabetes ([facility] [address]     Hyperlipidemia     Hypertension     Stroke [facility])     2012       Surgical History  History reviewed. No pertinent surgical history.      Medications  Current Outpatient Medications   Medication Sig Dispense Refill    glucose blood VI test strips (ASCENSIA AUTODISC VI, ONE TOUCH ULTRA TEST VI) strip Check blood glucose 2 x day 200 Strip 3    atorvastatin (LIPITOR) 80 mg tablet TAKE 1 TABLET BY MOUTH DAILY 90 Tablet 1    Blood-Glucose Meter monitoring kit Check blood glucose 2 x day 1 Kit 0    lancets misc Check blood glucose 2 x day 200 Each 3    metFORMIN (GLUCOPHAGE) 1,000 mg tablet Take 1 Tab by mouth two (2) times daily (with meals). 180 Tab 1    amLODIPine (NORVASC) 10 mg tablet TAKE 1 TABLET BY MOUTH DAILY 90 Tab 2    meloxicam (MOBIC) 15 mg tablet Take 1 Tab by mouth daily. 90 Tab 1    glipiZIDE (GLUCOTROL) 5 mg tablet TAKE 2 TABLETS BY MOUTH TWICE DAILY 360 Tab 1    aspirin delayed-release 81 mg tablet Take 1 Tab by mouth daily. 90 Tab 1    losartan (COZAAR) 100 mg tablet TAKE 1 TABLET BY MOUTH DAILY 90 Tab 1    PARoxetine (PAXIL) 10 mg tablet Take 1 Tab by mouth daily. Indications: \"change of life\" signs (Patient not taking: Reported on 7/27/2021) 30 Tab 3       Allergies  No Known Allergies    Family History  History reviewed. No pertinent family history. Social History  Social History     Socioeconomic History    Marital status: SINGLE     Spouse name: Not on file    Number of children: Not on file    Years of education: Not on file    Highest education level: Not on file   Occupational History    Not on file   Tobacco Use    Smoking status: Current Every Day Smoker     Packs/day: 0.50     Types: Cigarettes    Smokeless tobacco: Never Used   Vaping Use    Vaping Use: Never used   Substance and Sexual Activity    Alcohol use: Yes     Alcohol/week: 0.0 standard drinks     Comment: occassionally     Drug use: No    Sexual activity: Never   Other Topics Concern    Not on file   Social History Narrative    Not on file     Social Determinants of Health     Financial Resource Strain:     Difficulty of Paying Living Expenses:    Food Insecurity:     Worried About Running Out of Food in the Last Year:     [address] in the Last Year:    Transportation Needs:     Lack of Transportation (Medical):      Lack of Transportation (Non-Medical):    Physical Activity:     Days of Exercise per Week:     Minutes of Exercise per Session: Stress:     Feeling of Stress :    Social Connections:     Frequency of Communication with Friends and Family:     Frequency of Social Gatherings with Friends and Family:     Attends Hindu Services:     Active Member of Clubs or Organizations:     Attends Club or Organization Meetings:     Marital Status:    Intimate Partner Violence:     Fear of Current or Ex-Partner:     Emotionally Abused:     Physically Abused:     Sexually Abused:        Review of Systems  Review of Systems - Review of all systems is negative except as noted above in the HPI. Vital Signs  Visit Vitals  /78   Pulse 82   Temp (!) 96.5 °F (35.8 °C) (Oral)   Resp 18   Ht 5' 5\" (1.651 m)   Wt 175 lb (79.4 kg)   SpO2 98%   BMI 29.12 kg/m²         Physical Exam  Physical Examination: General appearance - oriented to person, place, and time and acyanotic, in no respiratory distress  Mental status - affect appropriate to mood  Mouth - mucous membranes moist, pharynx normal without lesions  Neck - supple, no significant adenopathy  Lymphatics - no palpable lymphadenopathy  Chest - clear to auscultation, no wheezes, rales or rhonchi, symmetric air entry  Heart - S1 and S2 normal  Abdomen - no rebound tenderness noted  Back exam - limited range of motion  Neurological - abnormal neurological exam unchanged from prior examinations  Musculoskeletal - osteoarthritic changes noted in both hands  Extremities - no pedal edema noted, intact peripheral pulses  Skin -scalp with an area of skin loss that is slightly swollen.     Diabetic foot exam:     Left Foot:   Visual Exam: normal    Pulse DP: 2+ (normal)   Filament test: normal sensation        Right Foot:   Visual Exam: normal    Pulse DP: 2+ (normal)   Filament test: normal sensation      Results  Results for orders placed or performed during the hospital encounter of 07/27/21   PAP IG, CT-NG-TV, APTIMA HPV AND RFX 16/18,45([id number])   Result Value Ref Range    Diagnosis Comment Specimen adequacy Comment      Performed by: Comment      Note: Comment      Methodology Comment      HPV APTIMA Negative Negative      Chlamydia, Nuc. Acid Amp Negative Negative      Gonococcus, Nuc. Acid Amp Negative Negative      Trich vag by BETZY Negative Negative         ASSESSMENT and PLAN    ICD-10-CM ICD-9-CM    1. Type 2 diabetes mellitus without complication, without long-term current use of insulin ([facility])  E11.9 250.00 LIPID PANEL      HEMOGLOBIN A1C WITH EAG      METABOLIC PANEL, COMPREHENSIVE      CBC WITH AUTOMATED DIFF      HM DIABETES FOOT EXAM      metFORMIN (GLUCOPHAGE) 1,000 mg tablet      glipiZIDE (GLUCOTROL) 5 mg tablet   2. Essential hypertension  I10 401.9 amLODIPine (NORVASC) 10 mg tablet      losartan (COZAAR) 100 mg tablet      atorvastatin (LIPITOR) 80 mg tablet   3. Other hyperlipidemia  E78.49 272.4 LIPID PANEL   4. Scalp lesion  L98.9 709.9 REFERRAL TO DERMATOLOGY   5. Comprehensive diabetic foot examination, type 2 DM, encounter for ([facility]  E11.9 250.00    6. Type 2 diabetes mellitus with hyperglycemia, without long-term current use of insulin ([facility])  E11.65 250.00      790.29    7. Medicare annual wellness visit, subsequent  Z00.00 V70.0    8. Advanced directives, counseling/discussion  Z71.89 V65.49    9.  Screening for depression  Z13.31 V79.0 [address]     lab results and schedule of future lab studies reviewed with patient  reviewed diet, exercise and weight control  cardiovascular risk and specific lipid/LDL goals reviewed  reviewed medications and side effects in detail  specific diabetic recommendations: low cholesterol diet, weight control and daily exercise discussed, home glucose monitoring emphasized, all medications, side effects and compliance discussed carefully, annual eye examinations at Ophthalmology discussed, glycohemoglobin and other lab monitoring discussed and long term diabetic complications discussed      I have discussed the diagnosis with the patient and the intended plan of care as seen in the above orders. The patient has received an after-visit summary and questions were answered concerning future plans. I have discussed medication, side effects, and warnings with the patient in detail. The patient verbalized understanding and is in agreement with the plan of care. The patient will contact the office with any additional concerns. Srinivasan Mcmillan MD    PLEASE NOTE:   This document has been produced using voice recognition software.  Unrecognized errors in transcription may be present

## 2021-10-06 NOTE — PROGRESS NOTES
This is the Subsequent Medicare Annual Wellness Exam, performed 12 months or more after the Initial AWV or the last Subsequent AWV    I have reviewed the patient's medical history in detail and updated the computerized patient record. Chief Complaint   Patient presents with   [facility] Annual Wellness Visit     1. Have you been to the ER, urgent care clinic since your last visit? Hospitalized since your last visit? No    2. Have you seen or consulted any other health care providers outside of the [address] [place] [address] since your last visit? Include any pap smears or colon screening. No      Assessment/Plan   Education and counseling provided:  Are appropriate based on today's review and evaluation  Influenza Vaccine    1. Medicare annual wellness visit, subsequent    2. Advanced directives, counseling/discussion    3. Screening for depression  - [facility] ANNUAL       Depression Risk Factor Screening     3 most recent PHQ Screens 10/6/2021   Little interest or pleasure in doing things Not at all   Feeling down, depressed, irritable, or hopeless Not at all   Total Score PHQ 2 0   Trouble falling or staying asleep, or sleeping too much Not at all   Feeling tired or having little energy Not at all   Poor appetite, weight loss, or overeating Not at all   Feeling bad about yourself - or that you are a failure or have let yourself or your family down Not at all   Trouble concentrating on things such as school, work, reading, or watching TV Not at all   Moving or speaking so slowly that other people could have noticed; or the opposite being so fidgety that others notice Not at all   Thoughts of being better off dead, or hurting yourself in some way Not at all   PHQ 9 Score 0   How difficult have these problems made it for you to do your work, take care of your home and get along with others Not difficult at all   10 minutes taken on depression screening.      Alcohol Risk Screen    Do you average more than 1 drink per night or more than 7 drinks a week:  No    On any one occasion in the past three months have you have had more than 3 drinks containing alcohol:  No        Functional Ability and Level of Safety     1. Was the patient's timed Up and GO test unsteady or longer than 30 seconds? No  2. Does the patient need help with the phone, transportation, shopping, preparing meals, housework, laundry, medications or managing money? No  3. Does the patients' home have rugs in the hallway, lack grab bars in the bathroom, lack handrails on the stairs or have poor lighting? No  4. Have you noticed any hearing difficulties? No  Hearing Evaluation:       Hearing: Hearing is good. Activities of Daily Living: The home contains: handrails and grab bars  Patient needs help with:  transportation, shopping, housework and walking      Ambulation: with difficulty, uses a cane     Fall Risk:  Fall Risk Assessment, last 12 mths 10/6/2021   Able to walk? Yes   Fall in past 12 months? 0   Do you feel unsteady?  0   Are you worried about falling 0      Abuse Screen:  Patient is not abused       Cognitive Screening    Has your family/caregiver stated any concerns about your memory: no     Cognitive Screening: Normal - Verbal Fluency Test    Health Maintenance Due     Health Maintenance Due   Topic Date Due    Shingrix Vaccine Age 49> (1 of 2) Never done    Breast Cancer Screen Mammogram  03/29/2020    Flu Vaccine (1) 09/01/2021    Foot Exam Q1  10/07/2021    Medicare Yearly Exam  10/08/2021    Lipid Screen  10/09/2021       Patient Care Team   Patient Care Team:  Maeve Rivas MD as PCP - General (Family Medicine)  Maeve Rivas MD as PCP - [facility]  Alvino Adame MD (Family Medicine)    History   Christopher Marcos comes in for medicare wellness exam    Patient Active Problem List   Diagnosis Code    Midline low back pain without sciatica M54.50    Tobacco use disorder F17.200    History of CVA (cerebrovascular accident) Z80.78    Essential hypertension I10    Hyperlipidemia E78.5    Type 2 diabetes mellitus without complication (HCC) L81.4    Type 2 diabetes mellitus without complication, without long-term current use of insulin (HCC) E11.9    ACP (advance care planning) Z71.89    Type 2 diabetes with nephropathy (HCC) E11.21    Age-related nuclear cataract, bilateral H25.13    Aphasic stroke [id number]    Cannabis abuse F12.10    Electrolyte abnormality E87.8    Long term (current) use of oral hypoglycemic drugs Z79.84    Open angle with borderline findings, low risk, 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 No pertinent surgical history. Current Outpatient Medications   Medication Sig Dispense Refill    glucose blood VI test strips (ASCENSIA AUTODISC VI, ONE TOUCH ULTRA TEST VI) strip Check blood glucose 2 x day 200 Strip 3    atorvastatin (LIPITOR) 80 mg tablet TAKE 1 TABLET BY MOUTH DAILY 90 Tablet 1    Blood-Glucose Meter monitoring kit Check blood glucose 2 x day 1 Kit 0    lancets misc Check blood glucose 2 x day 200 Each 3    metFORMIN (GLUCOPHAGE) 1,000 mg tablet Take 1 Tab by mouth two (2) times daily (with meals). 180 Tab 1    amLODIPine (NORVASC) 10 mg tablet TAKE 1 TABLET BY MOUTH DAILY 90 Tab 2    meloxicam (MOBIC) 15 mg tablet Take 1 Tab by mouth daily. 90 Tab 1    glipiZIDE (GLUCOTROL) 5 mg tablet TAKE 2 TABLETS BY MOUTH TWICE DAILY 360 Tab 1    aspirin delayed-release 81 mg tablet Take 1 Tab by mouth daily. 90 Tab 1    losartan (COZAAR) 100 mg tablet TAKE 1 TABLET BY MOUTH DAILY 90 Tab 1    PARoxetine (PAXIL) 10 mg tablet Take 1 Tab by mouth daily. Indications: \"change of life\" signs (Patient not taking: Reported on 7/27/2021) 30 Tab 3     No Known Allergies    History reviewed. No pertinent family history.   Social History Tobacco Use    Smoking status: Current Every Day Smoker     Packs/day: 0.50     Types: Cigarettes    Smokeless tobacco: Never Used   Substance Use Topics    Alcohol use: Yes     Alcohol/week: 0.0 standard drinks     Comment: occassionally    I have discussed the diagnosis with the patient and the intended plan of care as seen in the above orders. The patient has received an after-visit summary and questions were answered concerning future plans. I have discussed medication, side effects, and warnings with the patient in detail. The patient verbalized understanding and is in agreement with the plan of care. The patient will contact the office with any additional concerns. Personalized preventative plan of care was discussed, printed and given to patient.     Tamia Rose MD

## 2021-10-07 DIAGNOSIS — E11.9 TYPE 2 DIABETES MELLITUS WITHOUT COMPLICATION, WITHOUT LONG-TERM CURRENT USE OF INSULIN (HCC): Primary | ICD-10-CM

## 2021-10-07 NOTE — PROGRESS NOTES
Please let patient know her HbA1c is 8.2. We would want this to go down to below 7. She should intensify lifestyle and dietary modification. I will add Januvia to take 25 mg daily. She should keep a blood glucose log and I will follow-up at next visit. Rest of her labs are reassuring.   Corrie Mast MD

## 2022-01-06 ENCOUNTER — OFFICE VISIT (OUTPATIENT)
Dept: FAMILY MEDICINE CLINIC | Age: 57
End: 2022-01-06
Payer: MEDICARE

## 2022-01-06 VITALS
TEMPERATURE: 98.4 F | OXYGEN SATURATION: 99 % | DIASTOLIC BLOOD PRESSURE: 89 MMHG | WEIGHT: 164 LBS | BODY MASS INDEX: 27.32 KG/M2 | HEIGHT: 65 IN | HEART RATE: 72 BPM | RESPIRATION RATE: 20 BRPM | SYSTOLIC BLOOD PRESSURE: 163 MMHG

## 2022-01-06 DIAGNOSIS — E11.9 TYPE 2 DIABETES MELLITUS WITHOUT COMPLICATION, WITHOUT LONG-TERM CURRENT USE OF INSULIN (HCC): Primary | ICD-10-CM

## 2022-01-06 DIAGNOSIS — L98.9 SCALP LESION: ICD-10-CM

## 2022-01-06 DIAGNOSIS — E78.49 OTHER HYPERLIPIDEMIA: ICD-10-CM

## 2022-01-06 DIAGNOSIS — F17.200 TOBACCO USE DISORDER: ICD-10-CM

## 2022-01-06 DIAGNOSIS — E11.65 TYPE 2 DIABETES MELLITUS WITH HYPERGLYCEMIA, WITHOUT LONG-TERM CURRENT USE OF INSULIN (HCC): ICD-10-CM

## 2022-01-06 DIAGNOSIS — N95.1 MENOPAUSAL SYMPTOMS: ICD-10-CM

## 2022-01-06 DIAGNOSIS — G89.29 CHRONIC MIDLINE LOW BACK PAIN WITHOUT SCIATICA: ICD-10-CM

## 2022-01-06 DIAGNOSIS — M54.50 CHRONIC MIDLINE LOW BACK PAIN WITHOUT SCIATICA: ICD-10-CM

## 2022-01-06 DIAGNOSIS — I10 ESSENTIAL HYPERTENSION: ICD-10-CM

## 2022-01-06 LAB — HBA1C MFR BLD HPLC: 8.2 %

## 2022-01-06 PROCEDURE — G8753 SYS BP > OR = 140: HCPCS | Performed by: FAMILY MEDICINE

## 2022-01-06 PROCEDURE — G8427 DOCREV CUR MEDS BY ELIG CLIN: HCPCS | Performed by: FAMILY MEDICINE

## 2022-01-06 PROCEDURE — 99214 OFFICE O/P EST MOD 30 MIN: CPT | Performed by: FAMILY MEDICINE

## 2022-01-06 PROCEDURE — 3052F HG A1C>EQUAL 8.0%<EQUAL 9.0%: CPT | Performed by: FAMILY MEDICINE

## 2022-01-06 PROCEDURE — 2022F DILAT RTA XM EVC RTNOPTHY: CPT | Performed by: FAMILY MEDICINE

## 2022-01-06 PROCEDURE — G8510 SCR DEP NEG, NO PLAN REQD: HCPCS | Performed by: FAMILY MEDICINE

## 2022-01-06 PROCEDURE — 3017F COLORECTAL CA SCREEN DOC REV: CPT | Performed by: FAMILY MEDICINE

## 2022-01-06 PROCEDURE — G9899 SCRN MAM PERF RSLTS DOC: HCPCS | Performed by: FAMILY MEDICINE

## 2022-01-06 PROCEDURE — G8754 DIAS BP LESS 90: HCPCS | Performed by: FAMILY MEDICINE

## 2022-01-06 PROCEDURE — 83036 HEMOGLOBIN GLYCOSYLATED A1C: CPT | Performed by: FAMILY MEDICINE

## 2022-01-06 PROCEDURE — G8419 CALC BMI OUT NRM PARAM NOF/U: HCPCS | Performed by: FAMILY MEDICINE

## 2022-01-06 RX ORDER — HYDROCHLOROTHIAZIDE 12.5 MG/1
12.5 TABLET ORAL DAILY
Qty: 90 TABLET | Refills: 1 | Status: SHIPPED | OUTPATIENT
Start: 2022-01-06

## 2022-01-06 RX ORDER — PAROXETINE 10 MG/1
10 TABLET, FILM COATED ORAL DAILY
Qty: 90 TABLET | Refills: 1 | Status: SHIPPED | OUTPATIENT
Start: 2022-01-06 | End: 2022-10-18 | Stop reason: DRUGHIGH

## 2022-01-06 RX ORDER — LANCETS
EACH MISCELLANEOUS
Qty: 200 EACH | Refills: 3 | Status: SHIPPED | OUTPATIENT
Start: 2022-01-06 | End: 2022-07-19 | Stop reason: SDUPTHER

## 2022-01-06 RX ORDER — INSULIN PUMP SYRINGE, 3 ML
EACH MISCELLANEOUS
Qty: 1 KIT | Refills: 0 | Status: SHIPPED | OUTPATIENT
Start: 2022-01-06

## 2022-01-06 NOTE — PROGRESS NOTES
HPI  Oswaldo Neal comes in for f/u care. DM2: Patient has type 2 diabetes mellitus. She is on metformin and glipizide but is not taking [place] and [place]. Will recheck hba1c today. She is doing lifestyle and dietary modification. Her HbA1c has gone up to 8.2. I discussed the need to take Januvia in addition to the Metformin and glipizide. I will send a refill of medication. Her numbers continue to go up she may need to be seen by the endocrinologist.  She may also need to be on insulin. HTN: Patient has hypertension. Blood pressure is  elevated. She is on amlodipine, losartan. I will add HCTZ. She will take a low-sodium diet. She denies headache, changes in vision or focal weakness. Tobacco abuse d/o: Patient has tobacco use disorder. Has been smoking more than a pack a day for more than 30 years. She has been advised to quit the habit. She is not ready yet. Skin lesion: Patient has a scalp lesion. This is a swelling antibiotics. She has been seen by a dermatologist in the past.  There is minimal hair growth over the swelling. She wonders about getting it excised. She will discuss this with the dermatologist.  Referral will be placed. Dyslipidemia: Patient has dyslipidemia. She will continue to exercise and take a diet with polysaturated fats. She is on Lipitor. We will continue with the medication. Menopausal symptoms: Patient has menopausal symptoms. She gets hot flashes. She is on Paxil. Would like a refill of medication. CVA: Patient has a history of CVA in the past with resultant right-sided weakness. This has since resolved. She is on aspirin, BP medication and lipitor. She has been stable. Back pain: Patient has low back pain. This has been chronic for her. She takes meloxicam for the pain. She is doing lifestyle and dietary modification.       Past Medical History  Past Medical History:   Diagnosis Date    Diabetes ([facility]     Hyperlipidemia     Hypertension     Stroke ([facility] 2012       Surgical History  History reviewed. No pertinent surgical history. Medications  Current Outpatient Medications   Medication Sig Dispense Refill    amLODIPine (NORVASC) 10 mg tablet TAKE 1 TABLET BY MOUTH DAILY 90 Tablet 2    metFORMIN (GLUCOPHAGE) 1,000 mg tablet Take 1 Tablet by mouth two (2) times daily (with meals). 180 Tablet 1    losartan (COZAAR) 100 mg tablet TAKE 1 TABLET BY MOUTH DAILY 90 Tablet 1    glipiZIDE (GLUCOTROL) 5 mg tablet TAKE 2 TABLETS BY MOUTH TWICE DAILY 360 Tablet 1    atorvastatin (LIPITOR) 80 mg tablet Take 1 Tablet by mouth daily. 90 Tablet 1    glucose blood VI test strips (ASCENSIA AUTODISC VI, ONE TOUCH ULTRA TEST VI) strip Check blood glucose 2 x day 200 Strip 3    Blood-Glucose Meter monitoring kit Check blood glucose 2 x day 1 Kit 0    lancets misc Check blood glucose 2 x day 200 Each 3    meloxicam (MOBIC) 15 mg tablet Take 1 Tab by mouth daily. 90 Tab 1    aspirin delayed-release 81 mg tablet Take 1 Tab by mouth daily. 90 Tab 1    SITagliptin (JANUVIA) 25 mg tablet Take 1 Tablet by mouth daily. 13 Tablet 2       Allergies  No Known Allergies    Family History  History reviewed. No pertinent family history. Social History  Social History     Socioeconomic History    Marital status: SINGLE     Spouse name: Not on file    Number of children: Not on file    Years of education: Not on file    Highest education level: Not on file   Occupational History    Not on file   Tobacco Use    Smoking status: Current Every Day Smoker     Packs/day: 0.50     Types: Cigarettes    Smokeless tobacco: Never Used   Vaping Use    Vaping Use: Never used   Substance and Sexual Activity    Alcohol use:  Yes     Alcohol/week: 0.0 standard drinks     Comment: occassionally     Drug use: No    Sexual activity: Never   Other Topics Concern    Not on file   Social History Narrative    Not on file     Social Determinants of Health     Financial Resource Strain:     Difficulty of Paying Living Expenses: Not on file   Food Insecurity:     Worried About Running Out of Food in the Last Year: Not on file    Ran Out of Food in the Last Year: Not on file   Transportation Needs:     Lack of Transportation (Medical): Not on file    Lack of Transportation (Non-Medical): Not on file   Physical Activity:     Days of Exercise per Week: Not on file    Minutes of Exercise per Session: Not on file   Stress:     Feeling of Stress : Not on file   Social Connections:     Frequency of Communication with Friends and Family: Not on file    Frequency of Social Gatherings with Friends and Family: Not on file    Attends Catholic Services: Not on file    Active Member of [address] [place] [address] or Organizations: Not on file    Attends Club or Organization Meetings: Not on file    Marital Status: Not on file   Intimate Partner Violence:     Fear of Current or Ex-Partner: Not on file    Emotionally Abused: Not on file    Physically Abused: Not on file    Sexually Abused: Not on file   Housing Stability:     Unable to Pay for Housing in the Last Year: Not on file    Number of [place] in the Last Year: Not on file    Unstable Housing in the Last Year: Not on file       Review of Systems  Review of Systems - Review of all systems is negative except as noted above in the HPI.     Vital Signs  Visit Vitals  BP (!) 163/89   Pulse 72   Temp 98.4 °F (36.9 °C) (Temporal)   Resp 20   Ht 5' 5\" (1.651 m)   Wt 164 lb (74.4 kg)   SpO2 99%   BMI 27.29 kg/m²         Physical Exam  Physical Examination: General appearance - oriented to person, place, and time and acyanotic, in no respiratory distress  Mental status - affect appropriate to mood  Neck - supple, no significant adenopathy  Lymphatics - no palpable lymphadenopathy  Chest - no tachypnea, retractions or cyanosis  Heart - S1 and S2 normal  Abdomen - no rebound tenderness noted  Back exam - limited range of motion  Neurological - abnormal neurological exam unchanged from prior examinations  Musculoskeletal - osteoarthritic changes noted in both hands  Extremities - intact peripheral pulses  Skin -swelling that is fluctuant measures 2 x 3 cm on the vertex. Not tender. Results  Results for orders placed or performed during the hospital encounter of 10/06/21   LIPID PANEL   Result Value Ref Range    LIPID PROFILE          Cholesterol, total 143 <200 MG/DL    Triglyceride 111 <150 MG/DL    HDL Cholesterol 62 (H) 40 - 60 MG/DL    LDL, calculated 58.8 0 - 100 MG/DL    VLDL, calculated 22.2 MG/DL    CHOL/HDL Ratio 2.3 0 - 5.0     HEMOGLOBIN A1C WITH EAG   Result Value Ref Range    Hemoglobin A1c 8.2 (H) 4.2 - 5.6 %    Est. average glucose 172 mg/dL   METABOLIC PANEL, COMPREHENSIVE   Result Value Ref Range    Sodium 142 136 - 145 mmol/L    Potassium 4.4 3.5 - 5.5 mmol/L    Chloride 107 100 - 111 mmol/L    CO2 30 21 - 32 mmol/L    Anion gap 5 3.0 - 18 mmol/L    Glucose 196 (H) 74 - 99 mg/dL    BUN 10 7.0 - 18 MG/DL    Creatinine 0.91 0.6 - 1.3 MG/DL    BUN/Creatinine ratio 11 (L) 12 - 20      GFR est AA >60 >60 ml/min/1.73m2    GFR est non-AA >60 >60 ml/min/1.73m2    Calcium 9.7 8.5 - 10.1 MG/DL    Bilirubin, total 0.4 0.2 - 1.0 MG/DL    ALT (SGPT) 18 13 - 56 U/L    AST (SGOT) 12 10 - 38 U/L    Alk. phosphatase 138 (H) 45 - 117 U/L    Protein, total 7.2 6.4 - 8.2 g/dL    Albumin 3.5 3.4 - 5.0 g/dL    Globulin 3.7 2.0 - 4.0 g/dL    A-G Ratio 0.9 0.8 - 1.7     CBC WITH AUTOMATED DIFF   Result Value Ref Range    WBC 11.1 4.6 - 13.2 K/uL    RBC 4.78 4.20 - 5.30 M/uL    HGB 13.5 12.0 - 16.0 g/dL    HCT 42.1 35.0 - 45.0 %    MCV 88.1 78.0 - 100.0 FL    MCH 28.2 24.0 - 34.0 PG    MCHC 32.1 31.0 - 37.0 g/dL    RDW 13.2 11.6 - 14.5 %    PLATELET 557 846 - 613 K/uL    MPV 12.2 (H) 9.2 - 11.8 FL    NEUTROPHILS 61 40 - 73 %    LYMPHOCYTES 30 21 - 52 %    MONOCYTES 8 3 - 10 %    EOSINOPHILS 1 0 - 5 %    BASOPHILS 1 0 - 2 %    ABS. NEUTROPHILS 6.8 1.8 - 8.0 K/UL    ABS.  LYMPHOCYTES 3.3 0.9 - 3.6 K/UL    ABS. MONOCYTES 0.9 0.05 - 1.2 K/UL    ABS. EOSINOPHILS 0.1 0.0 - 0.4 K/UL    ABS. BASOPHILS 0.1 0.0 - 0.1 K/UL    DF AUTOMATED         ASSESSMENT and PLAN    ICD-10-CM ICD-9-CM    1. Type 2 diabetes mellitus without complication, without long-term current use of insulin (HCC)  E11.9 250.00 AMB POC HEMOGLOBIN A1C      SITagliptin (JANUVIA) 25 mg tablet      Blood-Glucose Meter monitoring kit      glucose blood VI test strips (ASCENSIA AUTODISC VI, ONE TOUCH ULTRA TEST VI) strip      lancets misc   2. Type 2 diabetes mellitus with hyperglycemia, without long-term current use of insulin (HCC)  E11.65 250.00      790.29    3. Essential hypertension  I10 401.9 hydroCHLOROthiazide (HYDRODIURIL) 12.5 mg tablet   4. Scalp lesion  L98.9 709.9    5. Other hyperlipidemia  E78.49 272.4    6. Menopausal symptoms  N95.1 627.2 PARoxetine (PAXIL) 10 mg tablet   7. Tobacco use disorder  F17.200 305.1    8. Chronic midline low back pain without sciatica  M54.50 724.2     G89.29 338.29      lab results and schedule of future lab studies reviewed with patient  reviewed diet, exercise and weight control  cardiovascular risk and specific lipid/LDL goals reviewed  reviewed medications and side effects in detail  specific diabetic recommendations: low cholesterol diet, weight control and daily exercise discussed, home glucose monitoring emphasized, all medications, side effects and compliance discussed carefully, annual eye examinations at Ophthalmology discussed, glycohemoglobin and other lab monitoring discussed and long term diabetic complications discussed  use of aspirin to prevent MI and TIA's discussed      I have discussed the diagnosis with the patient and the intended plan of care as seen in the above orders. The patient has received an after-visit summary and questions were answered concerning future plans. I have discussed medication, side effects, and warnings with the patient in detail.  The patient verbalized understanding and is in agreement with the plan of care. The patient will contact the office with any additional concerns. Jayshree Jospeh MD    PLEASE NOTE:   This document has been produced using voice recognition software.  Unrecognized errors in transcription may be present

## 2022-01-06 NOTE — PROGRESS NOTES
1. \"Have you been to the ER, urgent care clinic since your last visit? Hospitalized since your last visit? \" No    2. \"Have you seen or consulted any other health care providers outside of the [address] [place] [address] since your last visit? \" No     3. For patients aged 39-70: Has the patient had a colonoscopy / FIT/ Cologuard? Yes, HM satisfied with blue hyperlink     If the patient is female:    4. For patients aged 41-77: Has the patient had a mammogram within the past 2 years? Yes, HM satisfied with blue hyperlink    5. For patients aged 21-65: Has the patient had a pap smear?  Yes, HM satisfied with blue hyperlink

## 2022-03-15 ENCOUNTER — OFFICE VISIT (OUTPATIENT)
Dept: FAMILY MEDICINE CLINIC | Age: 57
End: 2022-03-15
Payer: MEDICARE

## 2022-03-15 VITALS
TEMPERATURE: 97.8 F | HEIGHT: 65 IN | WEIGHT: 167 LBS | SYSTOLIC BLOOD PRESSURE: 135 MMHG | BODY MASS INDEX: 27.82 KG/M2 | OXYGEN SATURATION: 98 % | HEART RATE: 77 BPM | RESPIRATION RATE: 22 BRPM | DIASTOLIC BLOOD PRESSURE: 71 MMHG

## 2022-03-15 DIAGNOSIS — E11.65 TYPE 2 DIABETES MELLITUS WITH HYPERGLYCEMIA, WITHOUT LONG-TERM CURRENT USE OF INSULIN (HCC): ICD-10-CM

## 2022-03-15 DIAGNOSIS — L98.9 SCALP LESION: ICD-10-CM

## 2022-03-15 DIAGNOSIS — E78.49 OTHER HYPERLIPIDEMIA: ICD-10-CM

## 2022-03-15 DIAGNOSIS — E11.22 TYPE 2 DIABETES MELLITUS WITH CHRONIC KIDNEY DISEASE, WITHOUT LONG-TERM CURRENT USE OF INSULIN, UNSPECIFIED CKD STAGE (HCC): ICD-10-CM

## 2022-03-15 DIAGNOSIS — E11.9 TYPE 2 DIABETES MELLITUS WITHOUT COMPLICATION, WITHOUT LONG-TERM CURRENT USE OF INSULIN (HCC): Primary | ICD-10-CM

## 2022-03-15 DIAGNOSIS — F17.200 TOBACCO USE DISORDER: ICD-10-CM

## 2022-03-15 DIAGNOSIS — I10 ESSENTIAL HYPERTENSION: ICD-10-CM

## 2022-03-15 DIAGNOSIS — Z12.31 SCREENING MAMMOGRAM FOR BREAST CANCER: ICD-10-CM

## 2022-03-15 PROBLEM — L66.3 DISSECTING CELLULITIS OF SCALP: Status: ACTIVE | Noted: 2022-03-15

## 2022-03-15 LAB
CREAT SERPL-MCNC: 10 MG/DL
MICROALBUMIN UR TEST STR-MCNC: 10 MG/L
MICROALBUMIN/CREAT RATIO POC: <30 MG/G

## 2022-03-15 PROCEDURE — G8754 DIAS BP LESS 90: HCPCS | Performed by: FAMILY MEDICINE

## 2022-03-15 PROCEDURE — 3052F HG A1C>EQUAL 8.0%<EQUAL 9.0%: CPT | Performed by: FAMILY MEDICINE

## 2022-03-15 PROCEDURE — 3017F COLORECTAL CA SCREEN DOC REV: CPT | Performed by: FAMILY MEDICINE

## 2022-03-15 PROCEDURE — G8510 SCR DEP NEG, NO PLAN REQD: HCPCS | Performed by: FAMILY MEDICINE

## 2022-03-15 PROCEDURE — 99214 OFFICE O/P EST MOD 30 MIN: CPT | Performed by: FAMILY MEDICINE

## 2022-03-15 PROCEDURE — G9899 SCRN MAM PERF RSLTS DOC: HCPCS | Performed by: FAMILY MEDICINE

## 2022-03-15 PROCEDURE — G8752 SYS BP LESS 140: HCPCS | Performed by: FAMILY MEDICINE

## 2022-03-15 PROCEDURE — 2022F DILAT RTA XM EVC RTNOPTHY: CPT | Performed by: FAMILY MEDICINE

## 2022-03-15 PROCEDURE — G8427 DOCREV CUR MEDS BY ELIG CLIN: HCPCS | Performed by: FAMILY MEDICINE

## 2022-03-15 PROCEDURE — 82044 UR ALBUMIN SEMIQUANTITATIVE: CPT | Performed by: FAMILY MEDICINE

## 2022-03-15 PROCEDURE — G8419 CALC BMI OUT NRM PARAM NOF/U: HCPCS | Performed by: FAMILY MEDICINE

## 2022-03-15 RX ORDER — BLOOD-GLUCOSE CONTROL, NORMAL
EACH MISCELLANEOUS
Qty: 200 EACH | Refills: 3 | Status: SHIPPED | OUTPATIENT
Start: 2022-03-15

## 2022-03-15 NOTE — PROGRESS NOTES
HPI  Enmanuel Pandey comes in for f/u care. DM2: Patient has type 2 diabetes mellitus. She is on glipizide and Metformin. Her last HbA1c was 8.2. We had added Januvia but she is yet to get this medication. States that she has also not been checking her blood glucose numbers. I encouraged her to intensify lifestyle and dietary modification. She will continue with the medication management. I will follow-up at next visit. We will check a microalbumin today. HTN: Patient has hypertension. Blood pressure is stable. She is on amlodipine, losartan and HCTZ. Blood pressure is stable. She denies headache, changes in vision or focal weakness. Continue current treatment plan. Scalp swelling: Patient has a scalp swelling. This is in the vertex of her head. It is getting bigger and is painful. She wants to have it excised. I will refer her to the dermatologist for this. Dyslipidemia: Patient has dyslipidemia. She is on atorvastatin. Stable on the medication. She will continue to exercise and take a diet low in polysaturated fats. I will recheck labs. Tobacco abuse disorder: Patient has tobacco abuse disorder. She continues to smoke more than a pack a day. Has been smoking for more than 30 years. Patient is strongly advised to quit the habit. She is not ready yet. Overweight: Patient has a BMI of 37.79. She will intensify lifestyle and dietary modification. Menopausal symptoms: Patient has menopausal symptoms with hot flashes that are persistent. She takes Paxil for this. Continue current treatment plan. HM: Patient will have referral placed for mammogram.      Past Medical History  Past Medical History:   Diagnosis Date    Diabetes ([facility] [address]     Hyperlipidemia     Hypertension     Stroke [facility])     2012       Surgical History  History reviewed. No pertinent surgical history.      Medications  Current Outpatient Medications   Medication Sig Dispense Refill    PARoxetine (PAXIL) 10 mg tablet Take 1 Tablet by mouth daily. Indications: \"change of life\" signs 90 Tablet 1    hydroCHLOROthiazide (HYDRODIURIL) 12.5 mg tablet Take 1 Tablet by mouth daily. 90 Tablet 1    SITagliptin (JANUVIA) 25 mg tablet Take 1 Tablet by mouth daily. 30 Tablet 5    Blood-Glucose Meter monitoring kit Check blood glucose 2 x day 1 Kit 0    glucose blood VI test strips (ASCENSIA AUTODISC VI, ONE TOUCH ULTRA TEST VI) strip Check blood glucose 2 x day 200 Strip 3    lancets misc Check blood glucose 2 x day 200 Each 3    amLODIPine (NORVASC) 10 mg tablet TAKE 1 TABLET BY MOUTH DAILY 90 Tablet 2    metFORMIN (GLUCOPHAGE) 1,000 mg tablet Take 1 Tablet by mouth two (2) times daily (with meals). 180 Tablet 1    losartan (COZAAR) 100 mg tablet TAKE 1 TABLET BY MOUTH DAILY 90 Tablet 1    glipiZIDE (GLUCOTROL) 5 mg tablet TAKE 2 TABLETS BY MOUTH TWICE DAILY 360 Tablet 1    atorvastatin (LIPITOR) 80 mg tablet Take 1 Tablet by mouth daily. 90 Tablet 1    meloxicam (MOBIC) 15 mg tablet Take 1 Tab by mouth daily. 90 Tab 1    aspirin delayed-release 81 mg tablet Take 1 Tab by mouth daily. 90 Tab 1       Allergies  No Known Allergies    Family History  History reviewed. No pertinent family history. Social History  Social History     Socioeconomic History    Marital status: SINGLE     Spouse name: Not on file    Number of children: Not on file    Years of education: Not on file    Highest education level: Not on file   Occupational History    Not on file   Tobacco Use    Smoking status: Current Every Day Smoker     Packs/day: 0.50     Types: Cigarettes    Smokeless tobacco: Never Used   Vaping Use    Vaping Use: Never used   Substance and Sexual Activity    Alcohol use:  Yes     Alcohol/week: 0.0 standard drinks     Comment: occassionally     Drug use: No    Sexual activity: Never   Other Topics Concern    Not on file   Social History Narrative    Not on file     Social Determinants of Health     Financial Resource Strain:  Difficulty of Paying Living Expenses: Not on file   Food Insecurity:     Worried About Running Out of Food in the Last Year: Not on file    Ran Out of Food in the Last Year: Not on file   Transportation Needs:     Lack of Transportation (Medical): Not on file    Lack of Transportation (Non-Medical): Not on file   Physical Activity:     Days of Exercise per Week: Not on file    Minutes of Exercise per Session: Not on file   Stress:     Feeling of Stress : Not on file   Social Connections:     Frequency of Communication with Friends and Family: Not on file    Frequency of Social Gatherings with Friends and Family: Not on file    Attends Scientologist Services: Not on file    Active Member of [address] [place] [address] or Organizations: Not on file    Attends Club or Organization Meetings: Not on file    Marital Status: Not on file   Intimate Partner Violence:     Fear of Current or Ex-Partner: Not on file    Emotionally Abused: Not on file    Physically Abused: Not on file    Sexually Abused: Not on file   Housing Stability:     Unable to Pay for Housing in the Last Year: Not on file    Number of [place] in the Last Year: Not on file    Unstable Housing in the Last Year: Not on file       Review of Systems  Review of Systems - Review of all systems is negative except as noted above in the HPI.     Vital Signs  Visit Vitals  /71   Pulse 77   Temp 97.8 °F (36.6 °C) (Temporal)   Resp 22   Ht 5' 5\" (1.651 m)   Wt 167 lb (75.8 kg)   SpO2 98%   BMI 27.79 kg/m²         Physical Exam  Physical Examination: General appearance - alert, well appearing, and in no distress, oriented to person, place, and time and acyanotic, in no respiratory distress  Mental status - alert, oriented to person, place, and time, affect appropriate to mood  Chest - clear to auscultation, no wheezes, rales or rhonchi, symmetric air entry  Heart - S1 and S2 normal  Abdomen - soft, nontender, nondistended, no masses or organomegaly  Back exam - limited range of motion  Neurological - abnormal neurological exam unchanged from prior examinations  Musculoskeletal - osteoarthritic changes noted in both hands  Extremities - feet normal, good pulses, normal color, temperature and sensation  Skin -swelling vertex of the scalp without overlying hair, no erythema or drainage. Swelling is fluctuant measures 4 x 4 cm. Results  Results for orders placed or performed in visit on 01/06/22   AMB POC HEMOGLOBIN A1C   Result Value Ref Range    Hemoglobin A1c (POC) 8.2 %       ASSESSMENT and PLAN    ICD-10-CM ICD-9-CM    1. Type 2 diabetes mellitus without complication, without long-term current use of insulin ([facility])  E11.9 250.00 AMB POC URINE, MICROALBUMIN, SEMIQUANTITATIVE   2. Essential hypertension  I10 401.9    3. Scalp lesion  L98.9 709.9 REFERRAL TO DERMATOLOGY   4. Other hyperlipidemia  E78.49 272.4    5. Tobacco use disorder  F17.200 305.1    6. Type 2 diabetes mellitus with hyperglycemia, without long-term current use of insulin ([facility])  E11.65 250.00      790.29    7. Type 2 diabetes mellitus with chronic kidney disease, without long-term current use of insulin, unspecified CKD stage ([facility])  E11.22 250.40      585.9    8.  Screening mammogram for breast cancer  Z12.31 V76.12 [facility] MAMMO BI SCREENING INCL CAD     lab results and schedule of future lab studies reviewed with patient  reviewed diet, exercise and weight control  cardiovascular risk and specific lipid/LDL goals reviewed  reviewed medications and side effects in detail  specific diabetic recommendations: low cholesterol diet, weight control and daily exercise discussed, home glucose monitoring emphasized, all medications, side effects and compliance discussed carefully, annual eye examinations at Ophthalmology discussed, glycohemoglobin and other lab monitoring discussed and long term diabetic complications discussed  use of aspirin to prevent MI and TIA's discussed      I have discussed the diagnosis with the patient and the intended plan of care as seen in the above orders. The patient has received an after-visit summary and questions were answered concerning future plans. I have discussed medication, side effects, and warnings with the patient in detail. The patient verbalized understanding and is in agreement with the plan of care. The patient will contact the office with any additional concerns. Kylie Coelho MD    PLEASE NOTE:   This document has been produced using voice recognition software.  Unrecognized errors in transcription may be present

## 2022-03-15 NOTE — PROGRESS NOTES
Chief Complaint   Patient presents with    Follow Up Chronic Condition    Cyst     on top of head     1. \"Have you been to the ER, urgent care clinic since your last visit? Hospitalized since your last visit? \" No    2. \"Have you seen or consulted any other health care providers outside of the [address] [place] [address] since your last visit? \" No     3. For patients aged 39-70: Has the patient had a colonoscopy / FIT/ Cologuard? Yes - no Care Gap present      If the patient is female:    4. For patients aged 41-77: Has the patient had a mammogram within the past 2 years? No      5. For patients aged 21-65: Has the patient had a pap smear?  Yes - no Care Gap present

## 2022-03-18 PROBLEM — H25.13 AGE-RELATED NUCLEAR CATARACT, BILATERAL: Status: ACTIVE | Noted: 2021-05-20

## 2022-03-19 PROBLEM — Z71.89 ACP (ADVANCE CARE PLANNING): Status: ACTIVE | Noted: 2017-02-23

## 2022-03-19 PROBLEM — H40.013 OPEN ANGLE WITH BORDERLINE FINDINGS, LOW RISK, BILATERAL: Status: ACTIVE | Noted: 2021-05-20

## 2022-03-19 PROBLEM — Z79.84 LONG TERM (CURRENT) USE OF ORAL HYPOGLYCEMIC DRUGS: Status: ACTIVE | Noted: 2021-05-20

## 2022-03-20 PROBLEM — E11.21 TYPE 2 DIABETES WITH NEPHROPATHY (HCC): Status: ACTIVE | Noted: 2020-10-07

## 2022-03-24 PROBLEM — L66.3 DISSECTING CELLULITIS OF SCALP: Status: ACTIVE | Noted: 2022-03-15

## 2022-03-24 PROBLEM — E11.22 TYPE 2 DIABETES MELLITUS WITH CHRONIC KIDNEY DISEASE (HCC): Status: ACTIVE | Noted: 2022-03-15

## 2022-04-08 DIAGNOSIS — I10 ESSENTIAL HYPERTENSION: ICD-10-CM

## 2022-04-11 RX ORDER — LOSARTAN POTASSIUM 100 MG/1
TABLET ORAL
Qty: 90 TABLET | Refills: 1 | Status: SHIPPED | OUTPATIENT
Start: 2022-04-11

## 2022-05-04 ENCOUNTER — PATIENT OUTREACH (OUTPATIENT)
Dept: CASE MANAGEMENT | Age: 57
End: 2022-05-04

## 2022-05-04 NOTE — PROGRESS NOTES
.Date/Time:  5/4/2022 2:21 PM    Method of communication with patient:phone    [facility] ( [facility]) made out reach to  patient by telephone to offer Complex Case Management  ( CCM). Provided introduction to self, and explanation of the Ambulatory Care . Contact at [phone] anytime Monday thru Friday 08:00-4:30.

## 2022-05-06 ENCOUNTER — PATIENT OUTREACH (OUTPATIENT)
Dept: CASE MANAGEMENT | Age: 57
End: 2022-05-06

## 2022-05-06 NOTE — PROGRESS NOTES
.Complex Case Management Denial       Date/Time:  2022 9:05 AM    Method of communication with patient:phone    [facility] ( [facility]) contacted the patient by telephone to perform Ambulatory Care Coordination. Verified name and  with patient as identifiers. Provided introduction to self, and explanation of the Ambulatory Care Manager's role. Reviewed most recent clinic visit w/ patient who verbalized understanding. Patient given an opportunity to ask questions. The patient Declines Care Coordination Services at this time. The patient agrees to contact the PCP office or the [address] [place] [address] for questions related to their healthcare. [facility] provided contact information for future reference.

## 2022-07-19 ENCOUNTER — OFFICE VISIT (OUTPATIENT)
Dept: FAMILY MEDICINE CLINIC | Age: 57
End: 2022-07-19
Payer: MEDICARE

## 2022-07-19 VITALS
HEART RATE: 86 BPM | OXYGEN SATURATION: 97 % | HEIGHT: 65 IN | RESPIRATION RATE: 20 BRPM | WEIGHT: 154 LBS | BODY MASS INDEX: 25.66 KG/M2 | DIASTOLIC BLOOD PRESSURE: 69 MMHG | SYSTOLIC BLOOD PRESSURE: 135 MMHG | TEMPERATURE: 98.3 F

## 2022-07-19 DIAGNOSIS — F17.200 TOBACCO USE DISORDER: ICD-10-CM

## 2022-07-19 DIAGNOSIS — N95.1 MENOPAUSAL SYMPTOMS: ICD-10-CM

## 2022-07-19 DIAGNOSIS — I10 ESSENTIAL HYPERTENSION: ICD-10-CM

## 2022-07-19 DIAGNOSIS — E78.49 OTHER HYPERLIPIDEMIA: ICD-10-CM

## 2022-07-19 DIAGNOSIS — E11.9 TYPE 2 DIABETES MELLITUS WITHOUT COMPLICATION, WITHOUT LONG-TERM CURRENT USE OF INSULIN (HCC): Primary | ICD-10-CM

## 2022-07-19 DIAGNOSIS — Z12.11 SCREEN FOR COLON CANCER: ICD-10-CM

## 2022-07-19 PROCEDURE — G8510 SCR DEP NEG, NO PLAN REQD: HCPCS | Performed by: FAMILY MEDICINE

## 2022-07-19 PROCEDURE — G8419 CALC BMI OUT NRM PARAM NOF/U: HCPCS | Performed by: FAMILY MEDICINE

## 2022-07-19 PROCEDURE — G8754 DIAS BP LESS 90: HCPCS | Performed by: FAMILY MEDICINE

## 2022-07-19 PROCEDURE — 3017F COLORECTAL CA SCREEN DOC REV: CPT | Performed by: FAMILY MEDICINE

## 2022-07-19 PROCEDURE — G8752 SYS BP LESS 140: HCPCS | Performed by: FAMILY MEDICINE

## 2022-07-19 PROCEDURE — 3052F HG A1C>EQUAL 8.0%<EQUAL 9.0%: CPT | Performed by: FAMILY MEDICINE

## 2022-07-19 PROCEDURE — G8427 DOCREV CUR MEDS BY ELIG CLIN: HCPCS | Performed by: FAMILY MEDICINE

## 2022-07-19 PROCEDURE — 2022F DILAT RTA XM EVC RTNOPTHY: CPT | Performed by: FAMILY MEDICINE

## 2022-07-19 PROCEDURE — G9899 SCRN MAM PERF RSLTS DOC: HCPCS | Performed by: FAMILY MEDICINE

## 2022-07-19 PROCEDURE — 99214 OFFICE O/P EST MOD 30 MIN: CPT | Performed by: FAMILY MEDICINE

## 2022-07-19 NOTE — PROGRESS NOTES
1. \"Have you been to the ER, urgent care clinic since your last visit? Hospitalized since your last visit? \" Yes Where: [facility] last month Knee issues    2. \"Have you seen or consulted any other health care providers outside of the [address] [place] [address] since your last visit? \" No     3. For patients aged 39-70: Has the patient had a colonoscopy / FIT/ Cologuard? Yes - no Care Gap present      If the patient is female:    4. For patients aged 41-77: Has the patient had a mammogram within the past 2 years? No      5. For patients aged 21-65: Has the patient had a pap smear?  Yes - no Care Gap present

## 2022-07-19 NOTE — PROGRESS NOTES
[facility]  Yola Barahona comes in for f/u care. DM2: Uncontrolled, not taking medication as prescribed and she ha snot been keeping blood glucose log. Hba1c is 9. 1. will increase januvia to 100 mg daily, continue metformin and glipizide, intensify lifestyle and dietary modification. HTN: She is on losartan and HCTZ and amlodipine, blood pressure stable, continue current medication. Dyslipidemia: stable, on lipitor, continue with medication. She will exercise and take diet low in polysaturated fats. Hot flashes and menopausal symptoms: she is on paxil, stable on the medication. Past Medical History  Past Medical History:   Diagnosis Date    Diabetes ([facility] [address]     Hyperlipidemia     Hypertension     Stroke [facility])     2012       Surgical History  History reviewed. No pertinent surgical history. Medications  Current Outpatient Medications   Medication Sig Dispense Refill    SITagliptin (JANUVIA) 100 mg tablet Take 1 Tablet by mouth daily. 90 Tablet 1    losartan (COZAAR) 100 mg tablet TAKE 1 TABLET BY MOUTH DAILY 90 Tablet 1    lancets (OneTouch Delica Lancets) 30 gauge misc Check blood glucose 2 x day 200 Each 3    hydroCHLOROthiazide (HYDRODIURIL) 12.5 mg tablet Take 1 Tablet by mouth daily. 90 Tablet 1    Blood-Glucose Meter monitoring kit Check blood glucose 2 x day 1 Kit 0    glucose blood VI test strips (ASCENSIA AUTODISC VI, ONE TOUCH ULTRA TEST VI) strip Check blood glucose 2 x day 200 Strip 3    amLODIPine (NORVASC) 10 mg tablet TAKE 1 TABLET BY MOUTH DAILY 90 Tablet 2    metFORMIN (GLUCOPHAGE) 1,000 mg tablet Take 1 Tablet by mouth two (2) times daily (with meals). 180 Tablet 1    glipiZIDE (GLUCOTROL) 5 mg tablet TAKE 2 TABLETS BY MOUTH TWICE DAILY 360 Tablet 1    atorvastatin (LIPITOR) 80 mg tablet Take 1 Tablet by mouth daily. 90 Tablet 1    aspirin delayed-release 81 mg tablet Take 1 Tab by mouth daily. 90 Tab 1    PARoxetine (PAXIL) 10 mg tablet Take 1 Tablet by mouth daily.  Indications: \"change of life\" signs (Patient not taking: Reported on 7/19/2022) 90 Tablet 1    meloxicam (MOBIC) 15 mg tablet Take 1 Tab by mouth daily. 90 Tab 1       Allergies  No Known Allergies    Family History  History reviewed. No pertinent family history. Social History  Social History     Socioeconomic History    Marital status: SINGLE     Spouse name: Not on file    Number of children: Not on file    Years of education: Not on file    Highest education level: Not on file   Occupational History    Not on file   Tobacco Use    Smoking status: Current Every Day Smoker     Packs/day: 0.50     Types: Cigarettes    Smokeless tobacco: Never Used   Vaping Use    Vaping Use: Never used   Substance and Sexual Activity    Alcohol use: Yes     Alcohol/week: 0.0 standard drinks     Comment: occassionally     Drug use: No    Sexual activity: Never   Other Topics Concern    Not on file   Social History Narrative    Not on file     Social Determinants of Health     Financial Resource Strain:     Difficulty of Paying Living Expenses: Not on file   Food Insecurity:     Worried About [address] in the Last Year: Not on file    Ran Out of Food in the Last Year: Not on file   Transportation Needs:     Lack of Transportation (Medical): Not on file    Lack of Transportation (Non-Medical):  Not on file   Physical Activity:     Days of Exercise per Week: Not on file    Minutes of Exercise per Session: Not on file   Stress:     Feeling of Stress : Not on file   Social Connections:     Frequency of Communication with Friends and Family: Not on file    Frequency of Social Gatherings with Friends and Family: Not on file    Attends [employer] Services: Not on file    Active Member of Clubs or Organizations: Not on file    Attends Club or Organization Meetings: Not on file    Marital Status: Not on file   Intimate Partner Violence:     Fear of Current or Ex-Partner: Not on file    Emotionally Abused: Not on file    Physically Abused: Not on file Sexually Abused: Not on file   Housing Stability:     Unable to Pay for Housing in the Last Year: Not on file    Number of Places Lived in the Last Year: Not on file    Unstable Housing in the Last Year: Not on file       Review of Systems  Review of Systems - Review of all systems is negative except as noted above in the HPI. Vital Signs  Visit Vitals  /69 (BP 1 Location: Left upper arm, BP Patient Position: Sitting, BP Cuff Size: Adult long)   Pulse 86   Temp 98.3 °F (36.8 °C) (Temporal)   Resp 20   Ht 5' 5\" (1.651 m)   Wt 154 lb (69.9 kg)   SpO2 97%   BMI 25.63 kg/m²         Physical Exam  Physical Examination: General appearance - oriented to person, place, and time and acyanotic, in no respiratory distress  Mental status - alert, oriented to person, place, and time, affect appropriate to mood  Lymphatics - no palpable lymphadenopathy, no hepatosplenomegaly  Chest - clear to auscultation, no wheezes, rales or rhonchi, symmetric air entry  Heart - S1 and S2 normal  Abdomen - no rebound tenderness noted  Back exam - limited range of motion  Neurological - normal muscle tone, no tremors, strength 5/5  Musculoskeletal - osteoarthritic changes noted in both hands  Extremities - intact peripheral pulses      Results  Results for orders placed or performed in visit on 03/15/22   AMB POC URINE, MICROALBUMIN, SEMIQUANTITATIVE   Result Value Ref Range    Microalbumin urine (POC) 10 MG/L    Microalbumin/creat ratio (POC) <30 <30 MG/G    Creatinine 10 MG/DL       ASSESSMENT and PLAN    ICD-10-CM ICD-9-CM    1. Type 2 diabetes mellitus without complication, without long-term current use of insulin (HCC)  E11.9 250.00 AMB POC HEMOGLOBIN A1C      SITagliptin (JANUVIA) 100 mg tablet      2. Essential hypertension  I10 401.9       3. Other hyperlipidemia  E78.49 272.4       4. Tobacco use disorder  F17.200 305.1       5. Menopausal symptoms  N95.1 627.2       6.  Screen for colon cancer  Z12.11 V76.51 REFERRAL TO COLON AND RECTAL SURGERY        lab results and schedule of future lab studies reviewed with patient  reviewed diet, exercise and weight control  cardiovascular risk and specific lipid/LDL goals reviewed  reviewed medications and side effects in detail  specific diabetic recommendations: low cholesterol diet, weight control and daily exercise discussed, home glucose monitoring emphasized, all medications, side effects and compliance discussed carefully, annual eye examinations at Ophthalmology discussed, glycohemoglobin and other lab monitoring discussed, and long term diabetic complications discussed      I have discussed the diagnosis with the patient and the intended plan of care as seen in the above orders. The patient has received an after-visit summary and questions were answered concerning future plans. I have discussed medication, side effects, and warnings with the patient in detail. The patient verbalized understanding and is in agreement with the plan of care. The patient will contact the office with any additional concerns. Olman Tong MD    PLEASE NOTE:   This document has been produced using voice recognition software.  Unrecognized errors in transcription may be present

## 2022-07-26 ENCOUNTER — TELEPHONE (OUTPATIENT)
Dept: FAMILY MEDICINE CLINIC | Age: 57
End: 2022-07-26

## 2022-07-26 NOTE — TELEPHONE ENCOUNTER
Ms Jayna Antonio called the pharmacy for her medication (Eliana Sabillon was told her insurance does not cover it.  Please contact Ms Jayna Antonio when available

## 2022-10-17 ENCOUNTER — PATIENT OUTREACH (OUTPATIENT)
Dept: CASE MANAGEMENT | Age: 57
End: 2022-10-17

## 2022-10-17 NOTE — PROGRESS NOTES
Complex Case Management Denial       Date/Time:  10/17/2022 12:54 PM    Method of communication with patient:phone    LPN Care Coordinator (LPN CC) contacted the patient by telephone to perform Ambulatory Care Coordination. Verified name and  with patient as identifiers. Provided introduction to self, and explanation of the LPN CC's role. Reviewed most recent clinic visit w/ patient who verbalized understanding. Patient given an opportunity to ask questions. The patient Declines Care Coordination Services at this time. The patient agrees to contact the PCP office or the LPN CC for questions related to their healthcare. LPN CCprovided contact information for future reference.

## 2022-10-18 ENCOUNTER — OFFICE VISIT (OUTPATIENT)
Dept: FAMILY MEDICINE CLINIC | Age: 57
End: 2022-10-18
Payer: MEDICARE

## 2022-10-18 VITALS
SYSTOLIC BLOOD PRESSURE: 139 MMHG | WEIGHT: 160 LBS | HEIGHT: 65 IN | RESPIRATION RATE: 20 BRPM | DIASTOLIC BLOOD PRESSURE: 83 MMHG | OXYGEN SATURATION: 98 % | BODY MASS INDEX: 26.66 KG/M2 | TEMPERATURE: 97.9 F | HEART RATE: 75 BPM

## 2022-10-18 DIAGNOSIS — E78.49 OTHER HYPERLIPIDEMIA: ICD-10-CM

## 2022-10-18 DIAGNOSIS — E11.65 TYPE 2 DIABETES MELLITUS WITH HYPERGLYCEMIA, WITHOUT LONG-TERM CURRENT USE OF INSULIN (HCC): ICD-10-CM

## 2022-10-18 DIAGNOSIS — I10 ESSENTIAL HYPERTENSION: ICD-10-CM

## 2022-10-18 DIAGNOSIS — E11.9 TYPE 2 DIABETES MELLITUS WITHOUT COMPLICATION, WITHOUT LONG-TERM CURRENT USE OF INSULIN (HCC): Primary | ICD-10-CM

## 2022-10-18 DIAGNOSIS — Z23 ENCOUNTER FOR IMMUNIZATION: ICD-10-CM

## 2022-10-18 DIAGNOSIS — E11.9 COMPREHENSIVE DIABETIC FOOT EXAMINATION, TYPE 2 DM, ENCOUNTER FOR (HCC): ICD-10-CM

## 2022-10-18 DIAGNOSIS — Z00.00 MEDICARE ANNUAL WELLNESS VISIT, SUBSEQUENT: ICD-10-CM

## 2022-10-18 DIAGNOSIS — F17.200 TOBACCO USE DISORDER: ICD-10-CM

## 2022-10-18 DIAGNOSIS — N95.1 MENOPAUSAL SYMPTOMS: ICD-10-CM

## 2022-10-18 DIAGNOSIS — E11.22 TYPE 2 DIABETES MELLITUS WITH CHRONIC KIDNEY DISEASE, WITHOUT LONG-TERM CURRENT USE OF INSULIN, UNSPECIFIED CKD STAGE (HCC): ICD-10-CM

## 2022-10-18 DIAGNOSIS — Z13.31 SCREENING FOR DEPRESSION: ICD-10-CM

## 2022-10-18 PROCEDURE — 3052F HG A1C>EQUAL 8.0%<EQUAL 9.0%: CPT | Performed by: FAMILY MEDICINE

## 2022-10-18 PROCEDURE — 90677 PCV20 VACCINE IM: CPT | Performed by: FAMILY MEDICINE

## 2022-10-18 PROCEDURE — G8754 DIAS BP LESS 90: HCPCS | Performed by: FAMILY MEDICINE

## 2022-10-18 PROCEDURE — G0008 ADMIN INFLUENZA VIRUS VAC: HCPCS | Performed by: FAMILY MEDICINE

## 2022-10-18 PROCEDURE — 99214 OFFICE O/P EST MOD 30 MIN: CPT | Performed by: FAMILY MEDICINE

## 2022-10-18 PROCEDURE — G9899 SCRN MAM PERF RSLTS DOC: HCPCS | Performed by: FAMILY MEDICINE

## 2022-10-18 PROCEDURE — G0439 PPPS, SUBSEQ VISIT: HCPCS | Performed by: FAMILY MEDICINE

## 2022-10-18 PROCEDURE — G8510 SCR DEP NEG, NO PLAN REQD: HCPCS | Performed by: FAMILY MEDICINE

## 2022-10-18 PROCEDURE — 2022F DILAT RTA XM EVC RTNOPTHY: CPT | Performed by: FAMILY MEDICINE

## 2022-10-18 PROCEDURE — G0009 ADMIN PNEUMOCOCCAL VACCINE: HCPCS | Performed by: FAMILY MEDICINE

## 2022-10-18 PROCEDURE — G8752 SYS BP LESS 140: HCPCS | Performed by: FAMILY MEDICINE

## 2022-10-18 PROCEDURE — G8427 DOCREV CUR MEDS BY ELIG CLIN: HCPCS | Performed by: FAMILY MEDICINE

## 2022-10-18 PROCEDURE — G0444 DEPRESSION SCREEN ANNUAL: HCPCS | Performed by: FAMILY MEDICINE

## 2022-10-18 PROCEDURE — G8419 CALC BMI OUT NRM PARAM NOF/U: HCPCS | Performed by: FAMILY MEDICINE

## 2022-10-18 PROCEDURE — 3017F COLORECTAL CA SCREEN DOC REV: CPT | Performed by: FAMILY MEDICINE

## 2022-10-18 PROCEDURE — 90686 IIV4 VACC NO PRSV 0.5 ML IM: CPT | Performed by: FAMILY MEDICINE

## 2022-10-18 RX ORDER — PAROXETINE HYDROCHLORIDE 20 MG/1
20 TABLET, FILM COATED ORAL DAILY
Qty: 90 TABLET | Refills: 1 | Status: SHIPPED | OUTPATIENT
Start: 2022-10-18

## 2022-10-18 NOTE — PATIENT INSTRUCTIONS
Medicare Wellness Visit, Female     The best way to live healthy is to have a lifestyle where you eat a well-balanced diet, exercise regularly, limit alcohol use, and quit all forms of tobacco/nicotine, if applicable. Regular preventive services are another way to keep healthy. Preventive services (vaccines, screening tests, monitoring & exams) can help personalize your care plan, which helps you manage your own care. Screening tests can find health problems at the earliest stages, when they are easiest to treat. [facility] follows the current, evidence-based guidelines published by the [facility] A Sutton ([facility]) when recommending preventive services for our patients. Because we follow these guidelines, sometimes recommendations change over time as research supports it. (For example, mammograms used to be recommended annually. Even though Medicare will still pay for an annual mammogram, the newer guidelines recommend a mammogram every two years for women of average risk). Of course, you and your doctor may decide to screen more often for some diseases, based on your risk and your co-morbidities (chronic disease you are already diagnosed with). Preventive services for you include:  - Medicare offers their members a free annual wellness visit, which is time for you and your primary care provider to discuss and plan for your preventive service needs. Take advantage of this benefit every year!  -All adults over the age of 72 should receive the recommended pneumonia vaccines. Current USPSTF guidelines recommend a series of two vaccines for the best pneumonia protection.   -All adults should have a flu vaccine yearly and a tetanus vaccine every 10 years.   -All adults age 48 and older should receive the shingles vaccines (series of two vaccines).       -All adults age 38-68 who are overweight should have a diabetes screening test once every three years.   -All adults born between [birth year] and 1965 should be screened once for Hepatitis C.  -Other screening tests and preventive services for persons with diabetes include: an eye exam to screen for diabetic retinopathy, a kidney function test, a foot exam, and stricter control over your cholesterol.   -Cardiovascular screening for adults with routine risk involves an electrocardiogram (ECG) at intervals determined by your doctor.   -Colorectal cancer screenings should be done for adults age 54-65 with no increased risk factors for colorectal cancer. There are a number of acceptable methods of screening for this type of cancer. Each test has its own benefits and drawbacks. Discuss with your doctor what is most appropriate for you during your annual wellness visit. The different tests include: colonoscopy (considered the best screening method), a fecal occult blood test, a fecal DNA test, and sigmoidoscopy.    -A bone mass density test is recommended when a woman turns 65 to screen for osteoporosis. This test is only recommended one time, as a screening. Some providers will use this same test as a disease monitoring tool if you already have osteoporosis. -Breast cancer screenings are recommended every other year for women of normal risk, age 54-69.  -Cervical cancer screenings for women over age 72 are only recommended with certain risk factors.      Here is a list of your current Health Maintenance items (your personalized list of preventive services) with a due date:  Health Maintenance Due   Topic Date Due    Hepatitis B Vaccine (1 of 3 - Risk 3-dose series) Never done    Shingles Vaccine (1 of 2) Never done    Mammogram  03/29/2020    COVID-19 Vaccine (4 - Booster for Pfizer series) 05/13/2022    Colorectal Screening  07/25/2022    Cholesterol Test   10/06/2022    Eye Exam  10/28/2022         Medicare Wellness Visit, Female     The best way to live healthy is to have a lifestyle where you eat a well-balanced diet, exercise regularly, limit alcohol use, and quit all forms of tobacco/nicotine, if applicable. Regular preventive services are another way to keep healthy. Preventive services (vaccines, screening tests, monitoring & exams) can help personalize your care plan, which helps you manage your own care. Screening tests can find health problems at the earliest stages, when they are easiest to treat. [facility] follows the current, evidence-based guidelines published by the [facility] Jose A Herman ([facility]) when recommending preventive services for our patients. Because we follow these guidelines, sometimes recommendations change over time as research supports it. (For example, mammograms used to be recommended annually. Even though Medicare will still pay for an annual mammogram, the newer guidelines recommend a mammogram every two years for women of average risk). Of course, you and your doctor may decide to screen more often for some diseases, based on your risk and your co-morbidities (chronic disease you are already diagnosed with). Preventive services for you include:  - Medicare offers their members a free annual wellness visit, which is time for you and your primary care provider to discuss and plan for your preventive service needs. Take advantage of this benefit every year!  -All adults over the age of 72 should receive the recommended pneumonia vaccines. Current USPSTF guidelines recommend a series of two vaccines for the best pneumonia protection.   -All adults should have a flu vaccine yearly and a tetanus vaccine every 10 years.   -All adults age 48 and older should receive the shingles vaccines (series of two vaccines).       -All adults age 38-68 who are overweight should have a diabetes screening test once every three years.   -All adults born between 80 and 1965 should be screened once for Hepatitis C.  -Other screening tests and preventive services for persons with diabetes include: an eye exam to screen for diabetic retinopathy, a kidney function test, a foot exam, and stricter control over your cholesterol.   -Cardiovascular screening for adults with routine risk involves an electrocardiogram (ECG) at intervals determined by your doctor.   -Colorectal cancer screenings should be done for adults age 54-65 with no increased risk factors for colorectal cancer. There are a number of acceptable methods of screening for this type of cancer. Each test has its own benefits and drawbacks. Discuss with your doctor what is most appropriate for you during your annual wellness visit. The different tests include: colonoscopy (considered the best screening method), a fecal occult blood test, a fecal DNA test, and sigmoidoscopy.    -A bone mass density test is recommended when a woman turns 65 to screen for osteoporosis. This test is only recommended one time, as a screening. Some providers will use this same test as a disease monitoring tool if you already have osteoporosis. -Breast cancer screenings are recommended every other year for women of normal risk, age 54-69.  -Cervical cancer screenings for women over age 72 are only recommended with certain risk factors. Here is a list of your current Health Maintenance items (your personalized list of preventive services) with a due date:  Health Maintenance Due   Topic Date Due    Hepatitis B Vaccine (1 of 3 - Risk 3-dose series) Never done    Shingles Vaccine (1 of 2) Never done    Mammogram  03/29/2020    COVID-19 Vaccine (4 - Booster for Pfizer series) 05/13/2022    Colorectal Screening  07/25/2022    Cholesterol Test   10/06/2022    Eye Exam  10/28/2022         Medicare Wellness Visit, Female     The best way to live healthy is to have a lifestyle where you eat a well-balanced diet, exercise regularly, limit alcohol use, and quit all forms of tobacco/nicotine, if applicable.      Regular preventive services are another way to keep healthy. Preventive services (vaccines, screening tests, monitoring & exams) can help personalize your care plan, which helps you manage your own care. Screening tests can find health problems at the earliest stages, when they are easiest to treat. [facility] follows the current, evidence-based guidelines published by the [facility] Jose A Sutton ([facility]) when recommending preventive services for our patients. Because we follow these guidelines, sometimes recommendations change over time as research supports it. (For example, mammograms used to be recommended annually. Even though Medicare will still pay for an annual mammogram, the newer guidelines recommend a mammogram every two years for women of average risk). Of course, you and your doctor may decide to screen more often for some diseases, based on your risk and your co-morbidities (chronic disease you are already diagnosed with). Preventive services for you include:  - Medicare offers their members a free annual wellness visit, which is time for you and your primary care provider to discuss and plan for your preventive service needs. Take advantage of this benefit every year!  -All adults over the age of 72 should receive the recommended pneumonia vaccines. Current USPSTF guidelines recommend a series of two vaccines for the best pneumonia protection.   -All adults should have a flu vaccine yearly and a tetanus vaccine every 10 years.   -All adults age 48 and older should receive the shingles vaccines (series of two vaccines).       -All adults age 38-68 who are overweight should have a diabetes screening test once every three years.   -All adults born between 80 and 1965 should be screened once for Hepatitis C.  -Other screening tests and preventive services for persons with diabetes include: an eye exam to screen for diabetic retinopathy, a kidney function test, a foot exam, and stricter control over your cholesterol.   -Cardiovascular screening for adults with routine risk involves an electrocardiogram (ECG) at intervals determined by your doctor.   -Colorectal cancer screenings should be done for adults age 54-65 with no increased risk factors for colorectal cancer. There are a number of acceptable methods of screening for this type of cancer. Each test has its own benefits and drawbacks. Discuss with your doctor what is most appropriate for you during your annual wellness visit. The different tests include: colonoscopy (considered the best screening method), a fecal occult blood test, a fecal DNA test, and sigmoidoscopy.    -A bone mass density test is recommended when a woman turns 65 to screen for osteoporosis. This test is only recommended one time, as a screening. Some providers will use this same test as a disease monitoring tool if you already have osteoporosis. -Breast cancer screenings are recommended every other year for women of normal risk, age 54-69.  -Cervical cancer screenings for women over age 72 are only recommended with certain risk factors.      Here is a list of your current Health Maintenance items (your personalized list of preventive services) with a due date:  Health Maintenance Due   Topic Date Due    Hepatitis B Vaccine (1 of 3 - Risk 3-dose series) Never done    Shingles Vaccine (1 of 2) Never done    Mammogram  03/29/2020    COVID-19 Vaccine (4 - Booster for Pfizer series) 05/13/2022    Colorectal Screening  07/25/2022    Cholesterol Test   10/06/2022    Eye Exam  10/28/2022

## 2022-10-18 NOTE — PROGRESS NOTES
HPI Saint Bill comes in for follow-up care. Hypertension: Patient has hypertension. Blood pressure is stable. Denies headache, changes in vision or focal weakness. Patient is on losartan, HCTZ and amlodipine. We will continue with these medications. I will send in a refill of the same. We will check labs. She will take a low-sodium diet. Diabetes mellitus type 2: Patient has type 2 diabetes mellitus. She is on metformin, glipizide and Januvia. States that her blood glucose numbers have been around 130 and 140 range. She is doing lifestyle and dietary modification. Her last HbA1c was 8.4. We will recheck this at next visit. I did a foot exam that is stable. I will refer for diabetes eye exam.  Menopausal symptoms: Patient has menopausal symptoms with hot flashes. She has been taking Paxil for the menopausal symptoms. Continues to have symptoms however. We will go up on the Paxil 20 mg daily. Dyslipidemia: Patient has dyslipidemia. She is on Lipitor 80 mg daily. Stable on medication. She will take a diet low in polysaturated fats. We will recheck lipid panel. Mood disorder: Patient has a history of mood disorder. She is on Paxil. Tobacco abuse disorder: Patient continues to smoke. Has been advised to quit the habit. She is not ready yet. Health maintenance: Patient will get the flu vaccine and pneumonia vaccine today. Patient has scheduled to get colon cancer screening. She has a visit on 15 November this year. I gave her a number to call to schedule her mammogram.      Past Medical History  Past Medical History:   Diagnosis Date    Diabetes ([facility] [address]     Hyperlipidemia     Hypertension     Stroke [facility])     2012       Surgical History  History reviewed. No pertinent surgical history. Medications  Current Outpatient Medications   Medication Sig Dispense Refill    SITagliptin (JANUVIA) 100 mg tablet Take 1 Tablet by mouth daily.  90 Tablet 1    losartan (COZAAR) 100 mg tablet TAKE 1 TABLET BY MOUTH DAILY 90 Tablet 1    lancets (OneTouch Delica Lancets) 30 gauge misc Check blood glucose 2 x day 200 Each 3    hydroCHLOROthiazide (HYDRODIURIL) 12.5 mg tablet Take 1 Tablet by mouth daily. 90 Tablet 1    Blood-Glucose Meter monitoring kit Check blood glucose 2 x day 1 Kit 0    glucose blood VI test strips (ASCENSIA AUTODISC VI, ONE TOUCH ULTRA TEST VI) strip Check blood glucose 2 x day 200 Strip 3    amLODIPine (NORVASC) 10 mg tablet TAKE 1 TABLET BY MOUTH DAILY 90 Tablet 2    metFORMIN (GLUCOPHAGE) 1,000 mg tablet Take 1 Tablet by mouth two (2) times daily (with meals). 180 Tablet 1    glipiZIDE (GLUCOTROL) 5 mg tablet TAKE 2 TABLETS BY MOUTH TWICE DAILY 360 Tablet 1    atorvastatin (LIPITOR) 80 mg tablet Take 1 Tablet by mouth daily. 90 Tablet 1    meloxicam (MOBIC) 15 mg tablet Take 1 Tab by mouth daily. 90 Tab 1    aspirin delayed-release 81 mg tablet Take 1 Tab by mouth daily. 90 Tab 1    PARoxetine (PAXIL) 10 mg tablet Take 1 Tablet by mouth daily. Indications: \"change of life\" signs (Patient not taking: No sig reported) 90 Tablet 1       Allergies  No Known Allergies    Family History  History reviewed. No pertinent family history. Social History  Social History     Socioeconomic History    Marital status: SINGLE     Spouse name: Not on file    Number of children: Not on file    Years of education: Not on file    Highest education level: Not on file   Occupational History    Not on file   Tobacco Use    Smoking status: Every Day     Packs/day: 0.50     Types: Cigarettes    Smokeless tobacco: Never   Vaping Use    Vaping Use: Never used   Substance and Sexual Activity    Alcohol use:  Yes     Alcohol/week: 0.0 standard drinks     Comment: occassionally     Drug use: No    Sexual activity: Never   Other Topics Concern    Not on file   Social History Narrative    Not on file     Social Determinants of Health     Financial Resource Strain: Not on file   Food Insecurity: Not on file Transportation Needs: Not on file   Physical Activity: Not on file   Stress: Not on file   Social Connections: Not on file   Intimate Partner Violence: Not on file   Housing Stability: Not on file       Review of Systems  Review of Systems - Review of all systems is negative except as noted above in the HPI. Vital Signs  Visit Vitals  /83   Pulse 75   Temp 97.9 °F (36.6 °C)   Resp 20   Ht 5' 5\" (1.651 m)   Wt 160 lb (72.6 kg)   SpO2 98%   BMI 26.63 kg/m²         Physical Exam  Physical Examination: General appearance - alert, well appearing, and in no distress, oriented to person, place, and time, and acyanotic, in no respiratory distress  Mental status - alert, oriented to person, place, and time, affect appropriate to mood  Neck - supple, no significant adenopathy  Lymphatics - no palpable lymphadenopathy, no hepatosplenomegaly  Chest - clear to auscultation, no wheezes, rales or rhonchi, symmetric air entry  Heart - S1 and S2 normal  Abdomen - no rebound tenderness noted  Back exam - limited range of motion  Neurological - abnormal neurological exam unchanged from prior examinations  Musculoskeletal - osteoarthritic changes noted in both hands  Extremities - no pedal edema noted  Diabetic foot exam:     Left Foot:   Visual Exam: normal    Pulse DP: 2+ (normal)   Filament test: normal sensation        Right Foot:   Visual Exam: normal    Pulse DP: 2+ (normal)   Filament test: normal sensation      Results  Results for orders placed or performed in visit on 03/15/22   AMB POC URINE, MICROALBUMIN, SEMIQUANTITATIVE   Result Value Ref Range    Microalbumin urine (POC) 10 MG/L    Microalbumin/creat ratio (POC) <30 <30 MG/G    Creatinine 10 MG/DL       ASSESSMENT and PLAN    ICD-10-CM ICD-9-CM    1. Type 2 diabetes mellitus without complication, without long-term current use of insulin (HCC)  E11.9 250.00  DIABETES FOOT EXAM      2.  Comprehensive diabetic foot examination, type 2 DM, encounter for [facility]) E11.9 250.00  DIABETES FOOT EXAM      3. Essential hypertension  I10 401.9       4. Other hyperlipidemia  E78.49 272.4       5. Tobacco use disorder  F17.200 305.1       6. Type 2 diabetes mellitus with hyperglycemia, without long-term current use of insulin ([facility])  E11.65 250.00 LIPID PANEL     872.87 METABOLIC PANEL, COMPREHENSIVE      CBC WITH AUTOMATED DIFF      REFERRAL TO OPHTHALMOLOGY      7. Type 2 diabetes mellitus with chronic kidney disease, without long-term current use of insulin, unspecified CKD stage ([facility])  E11.22 250.40      585.9       8. Menopausal symptoms  N95.1 627.2 PARoxetine (PAXIL) 20 mg tablet      9. Medicare annual wellness visit, subsequent  Z00.00 V70.0       10. Encounter for immunization  Z23 V03.89 PNEUMOCOCCAL, PCV20, PREVNAR 20, (AGE 18 YRS+), IM, PF      INFLUENZA, FLUARIX, FLULAVAL, FLUZONE (AGE 6 MO+), AFLURIA(AGE 3Y+) IM, PF, 0.5 ML      [place] IMMUNIZ ADMIN,1 SINGLE/COMB VAC/TOXOID      [place] IMMUNIZ,ADMIN,EACH ADDL      11. Screening for depression  Z13.31 V79.0 [address]        lab results and schedule of future lab studies reviewed with patient  reviewed diet, exercise and weight control  cardiovascular risk and specific lipid/LDL goals reviewed  reviewed medications and side effects in detail  specific diabetic recommendations: low cholesterol diet, weight control and daily exercise discussed, home glucose monitoring emphasized, all medications, side effects and compliance discussed carefully, foot care discussed and Podiatry visits discussed, annual eye examinations at Ophthalmology discussed, glycohemoglobin and other lab monitoring discussed, and long term diabetic complications discussed      I have discussed the diagnosis with the patient and the intended plan of care as seen in the above orders. The patient has received an after-visit summary and questions were answered concerning future plans.  I have discussed medication, side effects, and warnings with the patient in detail. The patient verbalized understanding and is in agreement with the plan of care. The patient will contact the office with any additional concerns. Evangelina Cook MD    PLEASE NOTE:   This document has been produced using voice recognition software.  Unrecognized errors in transcription may be present

## 2022-10-18 NOTE — PROGRESS NOTES
After obtaining consent, and per orders of Dr. Jenna Cameron, injection of Prevanar 20 and [facility] were given by Temo Storey. Patient instructed to remain in clinic for 20 minutes afterwards, and to report any adverse reaction to me immediately.

## 2022-10-18 NOTE — PROGRESS NOTES
This is the Subsequent Medicare Annual Wellness Exam, performed 12 months or more after the Initial AWV or the last Subsequent AWV    I have reviewed the patient's medical history in detail and updated the computerized patient record. Assessment/Plan   Education and counseling provided:  Are appropriate based on today's review and evaluation    1. Medicare annual wellness visit, subsequent    2. Encounter for immunization  - PNEUMOCOCCAL, PCV20, PREVNAR 21, (AGE 18 YRS+), IM, PF  - INFLUENZA, FLUARIX, FLULAVAL, FLUZONE (AGE 6 MO+), AFLURIA(AGE 3Y+) IM, PF, 0.5 ML  - [place] IMMUNIZ ADMIN,1 SINGLE/COMB VAC/TOXOID  - [place] IMMUNIZ,ADMIN,EACH ADDL    3. Screening for depression  - DEPRESSION SCREEN ANNUAL  - [place] DEPRESSION SCREEN ANNUAL       Depression Risk Factor Screening     3 most recent PHQ Screens 10/18/2022   Little interest or pleasure in doing things Not at all   Feeling down, depressed, irritable, or hopeless Not at all   Total Score PHQ 2 0   Trouble falling or staying asleep, or sleeping too much Not at all   Feeling tired or having little energy Not at all   Poor appetite, weight loss, or overeating Not at all   Feeling bad about yourself - or that you are a failure or have let yourself or your family down Not at all   Trouble concentrating on things such as school, work, reading, or watching TV Not at all   Moving or speaking so slowly that other people could have noticed; or the opposite being so fidgety that others notice Not at all   Thoughts of being better off dead, or hurting yourself in some way Not at all   PHQ 9 Score 0   How difficult have these problems made it for you to do your work, take care of your home and get along with others Not difficult at all   8 minutes taken on depression screening.     Alcohol & Drug Abuse Risk Screen    Do you average more than 1 drink per night or more than 7 drinks a week:  No    On any one occasion in the past three months have you have had more than 3 drinks containing alcohol:  No          Functional Ability and Level of Safety    Hearing: Hearing is good. Activities of Daily Living: The home contains: no safety equipment. Patient does total self care      Ambulation: with no difficulty     Fall Risk:  Fall Risk Assessment, last 12 mths 10/6/2021   Able to walk? Yes   Fall in past 12 months? 0   Do you feel unsteady?  0   Are you worried about falling 0      Abuse Screen:  Patient is not abused       Cognitive Screening    Has your family/caregiver stated any concerns about your memory: no     Cognitive Screening: Normal - Verbal Fluency Test    Health Maintenance Due     Health Maintenance Due   Topic Date Due    Hepatitis B Vaccine (1 of 3 - Risk 3-dose series) Never done    Shingrix Vaccine Age 50> (1 of 2) Never done    Pneumococcal 0-64 years (2 - PCV) 07/19/2019    Breast Cancer Screen Mammogram  03/29/2020    COVID-19 Vaccine (4 - Booster for Pfizer series) 05/13/2022    Colorectal Cancer Screening Combo  07/25/2022    Flu Vaccine (1) 08/01/2022    Foot Exam Q1  10/06/2022    Lipid Screen  10/06/2022    Medicare Yearly Exam  10/07/2022    Eye Exam Retinal or Dilated  10/28/2022       Patient Care Team   Patient Care Team:  Buzz Wheatley MD as PCP - General (Family Medicine)  Buzz Wheatley MD as PCP - [facility]  Isis Bravo MD (Family Medicine)  Dick Rehman LPN as Care Coordinator    History   Ciara Lester comes in for Medicare wellness exam.    Patient Active Problem List   Diagnosis Code    Midline low back pain without sciatica M54.50    Tobacco use disorder F17.200    History of CVA (cerebrovascular accident) Z86.73    Essential hypertension I10    Hyperlipidemia E78.5    Type 2 diabetes mellitus without complication ([address] E94.5    Type 2 diabetes mellitus without complication, without long-term current use of insulin (HCC) E11.9    ACP (advance care planning) Z71.89    Type 2 diabetes with nephropathy ([address] E11.21    Age-related nuclear cataract, bilateral H25.13    Aphasic stroke [id number]    Cannabis abuse F12.10    Electrolyte abnormality E87.8    Long term (current) use of oral hypoglycemic drugs Z79.84    Open angle with borderline findings, low risk, 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 No pertinent surgical history. Current Outpatient Medications   Medication Sig Dispense Refill    SITagliptin (JANUVIA) 100 mg tablet Take 1 Tablet by mouth daily. 90 Tablet 1    losartan (COZAAR) 100 mg tablet TAKE 1 TABLET BY MOUTH DAILY 90 Tablet 1    lancets (OneTouch Delica Lancets) 30 gauge misc Check blood glucose 2 x day 200 Each 3    hydroCHLOROthiazide (HYDRODIURIL) 12.5 mg tablet Take 1 Tablet by mouth daily. 90 Tablet 1    Blood-Glucose Meter monitoring kit Check blood glucose 2 x day 1 Kit 0    glucose blood VI test strips (ASCENSIA AUTODISC VI, ONE TOUCH ULTRA TEST VI) strip Check blood glucose 2 x day 200 Strip 3    amLODIPine (NORVASC) 10 mg tablet TAKE 1 TABLET BY MOUTH DAILY 90 Tablet 2    metFORMIN (GLUCOPHAGE) 1,000 mg tablet Take 1 Tablet by mouth two (2) times daily (with meals). 180 Tablet 1    glipiZIDE (GLUCOTROL) 5 mg tablet TAKE 2 TABLETS BY MOUTH TWICE DAILY 360 Tablet 1    atorvastatin (LIPITOR) 80 mg tablet Take 1 Tablet by mouth daily. 90 Tablet 1    meloxicam (MOBIC) 15 mg tablet Take 1 Tab by mouth daily. 90 Tab 1    aspirin delayed-release 81 mg tablet Take 1 Tab by mouth daily. 90 Tab 1    PARoxetine (PAXIL) 10 mg tablet Take 1 Tablet by mouth daily.  Indications: \"change of life\" signs (Patient not taking: No sig reported) 90 Tablet 1     No Known Allergies    History reviewed. No pertinent family history. Social History     Tobacco Use    Smoking status: Every Day     Packs/day: 0.50     Types: Cigarettes    Smokeless tobacco: Never   Substance Use Topics    Alcohol use: Yes     Alcohol/week: 0.0 standard drinks     Comment: occassionally      I have discussed the diagnosis with the patient and the intended plan of care as seen in the above orders. The patient has received an after-visit summary and questions were answered concerning future plans. I have discussed medication, side effects, and warnings with the patient in detail. The patient verbalized understanding and is in agreement with the plan of care. The patient will contact the office with any additional concerns. Personalized preventative plan of care was discussed, printed and given to patient.     Austin Casillas MD

## 2023-01-03 DIAGNOSIS — I10 ESSENTIAL HYPERTENSION: ICD-10-CM

## 2023-01-03 DIAGNOSIS — E11.9 TYPE 2 DIABETES MELLITUS WITHOUT COMPLICATION, WITHOUT LONG-TERM CURRENT USE OF INSULIN (HCC): ICD-10-CM

## 2023-01-03 RX ORDER — GLIPIZIDE 5 MG/1
TABLET ORAL
Qty: 360 TABLET | Refills: 1 | Status: SHIPPED | OUTPATIENT
Start: 2023-01-03

## 2023-01-03 RX ORDER — AMLODIPINE BESYLATE 10 MG/1
TABLET ORAL
Qty: 90 TABLET | Refills: 2 | Status: SHIPPED | OUTPATIENT
Start: 2023-01-03

## 2023-01-24 DIAGNOSIS — I10 ESSENTIAL HYPERTENSION: ICD-10-CM

## 2023-01-24 RX ORDER — ATORVASTATIN CALCIUM 80 MG/1
80 TABLET, FILM COATED ORAL DAILY
Qty: 90 TABLET | Refills: 1 | Status: SHIPPED | OUTPATIENT
Start: 2023-01-24

## 2023-02-13 DIAGNOSIS — Z12.11 SCREEN FOR COLON CANCER: Primary | ICD-10-CM

## 2023-02-16 ENCOUNTER — OFFICE VISIT (OUTPATIENT)
Facility: CLINIC | Age: 58
End: 2023-02-16
Payer: MEDICARE

## 2023-02-16 VITALS
BODY MASS INDEX: 26.16 KG/M2 | OXYGEN SATURATION: 99 % | HEIGHT: 65 IN | WEIGHT: 157 LBS | RESPIRATION RATE: 20 BRPM | SYSTOLIC BLOOD PRESSURE: 143 MMHG | HEART RATE: 76 BPM | TEMPERATURE: 98 F | DIASTOLIC BLOOD PRESSURE: 73 MMHG

## 2023-02-16 DIAGNOSIS — Z11.4 SCREENING FOR HIV (HUMAN IMMUNODEFICIENCY VIRUS): ICD-10-CM

## 2023-02-16 DIAGNOSIS — E11.22 TYPE 2 DIABETES MELLITUS WITH CHRONIC KIDNEY DISEASE, WITHOUT LONG-TERM CURRENT USE OF INSULIN, UNSPECIFIED CKD STAGE (HCC): ICD-10-CM

## 2023-02-16 DIAGNOSIS — I10 ESSENTIAL (PRIMARY) HYPERTENSION: ICD-10-CM

## 2023-02-16 DIAGNOSIS — L02.811 CUTANEOUS ABSCESS OF HEAD EXCLUDING FACE: ICD-10-CM

## 2023-02-16 DIAGNOSIS — E78.49 OTHER HYPERLIPIDEMIA: ICD-10-CM

## 2023-02-16 DIAGNOSIS — L02.811 ABSCESS, SCALP: ICD-10-CM

## 2023-02-16 DIAGNOSIS — Z11.59 NEED FOR HEPATITIS C SCREENING TEST: ICD-10-CM

## 2023-02-16 DIAGNOSIS — E11.9 TYPE 2 DIABETES MELLITUS WITHOUT COMPLICATION, WITHOUT LONG-TERM CURRENT USE OF INSULIN (HCC): Primary | ICD-10-CM

## 2023-02-16 LAB
HBA1C MFR BLD: 10.2 %
MICROALB/CREAT RATIO, POC: NORMAL MG/G
MICROALBUMIN URINE, POC: 30 MG/L

## 2023-02-16 PROCEDURE — 3074F SYST BP LT 130 MM HG: CPT | Performed by: FAMILY MEDICINE

## 2023-02-16 PROCEDURE — 83036 HEMOGLOBIN GLYCOSYLATED A1C: CPT | Performed by: FAMILY MEDICINE

## 2023-02-16 PROCEDURE — 99215 OFFICE O/P EST HI 40 MIN: CPT | Performed by: FAMILY MEDICINE

## 2023-02-16 PROCEDURE — 3078F DIAST BP <80 MM HG: CPT | Performed by: FAMILY MEDICINE

## 2023-02-16 PROCEDURE — 10060 I&D ABSCESS SIMPLE/SINGLE: CPT | Performed by: FAMILY MEDICINE

## 2023-02-16 PROCEDURE — 82044 UR ALBUMIN SEMIQUANTITATIVE: CPT | Performed by: FAMILY MEDICINE

## 2023-02-16 RX ORDER — ASPIRIN 81 MG/1
81 TABLET ORAL DAILY
Qty: 90 TABLET | Refills: 1 | Status: SHIPPED | OUTPATIENT
Start: 2023-02-16

## 2023-02-16 RX ORDER — CLINDAMYCIN HYDROCHLORIDE 300 MG/1
300 CAPSULE ORAL 3 TIMES DAILY
Qty: 30 CAPSULE | Refills: 0 | Status: SHIPPED | OUTPATIENT
Start: 2023-02-16 | End: 2023-02-26

## 2023-02-16 RX ORDER — INSULIN GLARGINE 100 [IU]/ML
15 INJECTION, SOLUTION SUBCUTANEOUS NIGHTLY
Qty: 5 ADJUSTABLE DOSE PRE-FILLED PEN SYRINGE | Refills: 3 | Status: SHIPPED | OUTPATIENT
Start: 2023-02-16

## 2023-02-16 NOTE — PROGRESS NOTES
1. \"Have you been to the ER, urgent care clinic since your last visit? Hospitalized snce your last visit? \"No    2. \"Have you seen or consulted any other health care providers outside of the [address] [place] [address] since your last visit? \" No    3. For patients aged 39-70: Has the patient had a colonoscopy / FIT/ Cologuard? No      If the patient is female:    4. For patients aged 41-77: Has the patient had a mammogram within the past 2 years? No      5. For patients aged 21-65: Has the patient had a pap smear?  No

## 2023-02-16 NOTE — PROGRESS NOTES
[facility]  Senia Wiley comes in for acute and follow-up care. Uncontrolled diabetes mellitus: Patient has uncontrolled type 2 diabetes mellitus. HbA1c today is elevated at 10.5. She is not doing much of lifestyle or dietary modification. At times does not take her medications. She is on metformin, Januvia, glipizide. She is not doing lifestyle or dietary modification. We discussed need to control of blood glucose better. I will send in insulin 15 units daily. She will continue with the current oral medication. Patient will intensify lifestyle and dietary modification. I will refer her to the endocrinologist due to uncontrolled diabetes mellitus. Scalp lesion: Patient has chronic scalp lesion that recently has been draining pus. This is on the vertex. It is a swelling that has become bigger. It is painful. Patient would like to have this drained. This is an abscess. This needs incision and drainage. We discussed this with patient and the patient gave consent for the procedure. We will do procedure as noted below. Patient will be given clindamycin to take 3 times a day. Patient will do daily change of dressings. I will follow-up within the next 4 days. We did discuss coming in to the clinic for change of dressing but patient prefers to do it at home until next week. In the meantime we discussed things that patient should look out for including pain, persistent drainage of the abscess. If this occurs then patient will going to the emergency room for an evaluation. Patient noted to have possible unfused posterior fontanelle by the scalp swelling is. I will refer patient to the general surgeon for an evaluation given this is the area where the abscess was. Hypertension: Patient has hypertension. Denies headache, changes in vision or focal weakness. Patient is on amlodipine, HCTZ and losartan. Patient is stable on medication. We will continue current treatment plan.   Dyslipidemia: Patient has dyslipidemia. Patient is on Lipitor. Patient will take a diet low in polysaturated fats. Continue current treatment plan. We will recheck labs. Mood disorder: Patient has a history of mood disorder with anxiety and depression. Patient is on Paxil. Continue current treatment plan. Menopausal symptoms: Patient has a history of menopausal symptoms. She is on Paxil. Continue current treatment plan. Past Medical History  Past Medical History:   Diagnosis Date    Diabetes ([address]     Hyperlipidemia     Hypertension     Stroke [facility])     2012       Surgical History  No past surgical history on file. Medications  Current Outpatient Medications   Medication Sig Dispense Refill    aspirin 81 MG EC tablet Take 1 tablet by mouth daily 90 tablet 1    insulin glargine (LANTUS SOLOSTAR) 100 UNIT/ML injection pen Inject 15 Units into the skin nightly 5 Adjustable Dose Pre-filled Pen Syringe 3    clindamycin (CLEOCIN) 300 MG capsule Take 1 capsule by mouth 3 times daily for 10 days 30 capsule 0    amLODIPine (NORVASC) 10 MG tablet TAKE 1 TABLET BY MOUTH DAILY      atorvastatin (LIPITOR) 80 MG tablet Take 80 mg by mouth daily      glipiZIDE (GLUCOTROL) 5 MG tablet TAKE 2 TABLETS BY MOUTH TWICE DAILY      hydroCHLOROthiazide (HYDRODIURIL) 12.5 MG tablet Take 12.5 mg by mouth daily      losartan (COZAAR) 100 MG tablet TAKE 1 TABLET BY MOUTH DAILY (Patient not taking: Reported on 2/16/2023)      meloxicam (MOBIC) 15 MG tablet Take 15 mg by mouth daily (Patient not taking: Reported on 2/16/2023)      metFORMIN (GLUCOPHAGE) 1000 MG tablet Take 1,000 mg by mouth 2 times daily (with meals) (Patient not taking: Reported on 2/16/2023)      PARoxetine (PAXIL) 20 MG tablet Take 20 mg by mouth daily (Patient not taking: Reported on 2/16/2023)      SITagliptin (JANUVIA) 100 MG tablet Take 100 mg by mouth daily (Patient not taking: Reported on 2/16/2023)       No current facility-administered medications for this visit. Allergies  No Known Allergies    Family History  No family history on file. Social History  Social History     Socioeconomic History    Marital status: Single     Spouse name: Not on file    Number of children: Not on file    Years of education: Not on file    Highest education level: Not on file   Occupational History    Not on file   Tobacco Use    Smoking status: Every Day     Packs/day: 0.50     Types: Cigarettes    Smokeless tobacco: Never   Substance and Sexual Activity    Alcohol use: Yes     Alcohol/week: 0.0 standard drinks    Drug use: No    Sexual activity: Not on file   Other Topics Concern    Not on file   Social History Narrative    Not on file     Social Determinants of Health     Financial Resource Strain: Not on file   Food Insecurity: Not on file   Transportation Needs: Not on file   Physical Activity: Not on file   Stress: Not on file   Social Connections: Not on file   Intimate Partner Violence: Not on file   Housing Stability: Not on file       Review of Systems  Review of Systems - Review of all systems is negative except as noted above in the HPI.     Vital Signs  BP (!) 143/73   Pulse 76   Temp 98 °F (36.7 °C) (Temporal)   Resp 20   Ht 5' 5\" (1.651 m)   Wt 157 lb (71.2 kg)   SpO2 99%   BMI 26.13 kg/m²       Physical Exam  Physical Examination: General appearance - oriented to person, place, and time and acyanotic, in no respiratory distress  Mental status - affect appropriate to mood  Mouth - mucous membranes moist, pharynx normal without lesions  Neck - supple, no significant adenopathy  Lymphatics - no palpable lymphadenopathy  Chest - no tachypnea, retractions or cyanosis  Heart - S1 and S2 normal  Abdomen - no rebound tenderness noted  Back exam - limited range of motion  Neurological - abnormal neurological exam unchanged from prior examinations  Musculoskeletal - osteoarthritic changes noted in both hands  Extremities - intact peripheral pulses  Skin -abscess noted vertex of the head that measures about 4 x 3 cm and draining purulent material.        Results  Results for orders placed or performed in visit on 02/16/23   AMB POC HEMOGLOBIN A1C   Result Value Ref Range    Hemoglobin A1C, POC 10.2 %       ASSESSMENT and PLAN   Diagnosis Orders   1. Type 2 diabetes mellitus without complication, without long-term current use of insulin (HCC)  AMB POC HEMOGLOBIN A1C    AMB POC URINE, MICROALBUMIN, SEMIQUANTITATIVE    aspirin 81 MG EC tablet    blood glucose monitor kit and supplies      2. Abscess, scalp  [facility] - DANDY Amezcua MD, General Surgery, [place]    clindamycin (CLEOCIN) 300 MG capsule      3. Type 2 diabetes mellitus with chronic kidney disease, without long-term current use of insulin, unspecified CKD stage (HCC)  insulin glargine (LANTUS SOLOSTAR) 100 UNIT/ML injection pen    CBC with Auto Differential    Comprehensive Metabolic Panel    External Referral To Endocrinology      4. Essential (primary) hypertension        5. Other hyperlipidemia  Lipid Panel      6. Screening for HIV (human immunodeficiency virus)  HIV 1/2 Ag/Ab, 4TH Generation,W Rflx Confirm      7. Need for hepatitis C screening test  Hepatitis C Antibody      8. Cutaneous abscess of head excluding face  lidocaine 1 % injection 5 mL    DRAIN SKIN ABSCESS SIMPLE        Procedure note:    Timeout: Timeout was done. Correct patient and correct procedure and correct location was identified and witnessed.   Procedure done on 02/16/2023 at 11:30 AM.    Skin exam:  Abscess on vertex of the scalp with patient's observations stated as increasing swelling that is painful and recently started draining some pus, exam of this area shows abscess that is fluctuant vertex of the scalp, slightly tender and draining purulent material.      Assessment:   skin abscess    Procedure Note:   After informed consent was obtained, using betadine for cleansing and 1% lidocaine for anesthetic, with sterile technique, incision and drainage of abscess was performed. Antibiotic dressing is applied, and wound care instructions provided. Be alert for any signs of cutaneous infection. The procedure was well tolerated without complications. Follow up: Patient will return in 4 days for follow-up care. Patient will change dressing every 24 hours. .         I have discussed the diagnosis with the patient and the intended plan of care as seen in the above orders. The patient has received an after-visit summary and questions were answered concerning future plans. I have discussed medication, side effects, and warnings with the patient in detail. The patient verbalized understanding and is in agreement with the plan of care. The patient will contact the office with any additional concerns. I spent 55 minutes with this established patient. Jake Min MD, MD    PLEASE NOTE:   This document has been produced using voice recognition software.  Unrecognized errors in transcription may be present

## 2023-02-20 ENCOUNTER — OFFICE VISIT (OUTPATIENT)
Facility: CLINIC | Age: 58
End: 2023-02-20
Payer: MEDICARE

## 2023-02-20 VITALS
HEIGHT: 64 IN | TEMPERATURE: 97.7 F | OXYGEN SATURATION: 99 % | BODY MASS INDEX: 26.98 KG/M2 | DIASTOLIC BLOOD PRESSURE: 66 MMHG | SYSTOLIC BLOOD PRESSURE: 139 MMHG | WEIGHT: 158 LBS | HEART RATE: 72 BPM | RESPIRATION RATE: 18 BRPM

## 2023-02-20 DIAGNOSIS — L02.811 ABSCESS, SCALP: Primary | ICD-10-CM

## 2023-02-20 DIAGNOSIS — I10 ESSENTIAL (PRIMARY) HYPERTENSION: ICD-10-CM

## 2023-02-20 DIAGNOSIS — E11.22 TYPE 2 DIABETES MELLITUS WITH CHRONIC KIDNEY DISEASE, WITHOUT LONG-TERM CURRENT USE OF INSULIN, UNSPECIFIED CKD STAGE (HCC): ICD-10-CM

## 2023-02-20 PROCEDURE — 99213 OFFICE O/P EST LOW 20 MIN: CPT | Performed by: FAMILY MEDICINE

## 2023-02-20 PROCEDURE — 3074F SYST BP LT 130 MM HG: CPT | Performed by: FAMILY MEDICINE

## 2023-02-20 PROCEDURE — 3078F DIAST BP <80 MM HG: CPT | Performed by: FAMILY MEDICINE

## 2023-02-20 RX ORDER — PEN NEEDLE, DIABETIC 30 GX5/16"
NEEDLE, DISPOSABLE MISCELLANEOUS
Qty: 100 EACH | Refills: 3 | Status: SHIPPED | OUTPATIENT
Start: 2023-02-20

## 2023-02-20 ASSESSMENT — PATIENT HEALTH QUESTIONNAIRE - PHQ9
1. LITTLE INTEREST OR PLEASURE IN DOING THINGS: 0
SUM OF ALL RESPONSES TO PHQ9 QUESTIONS 1 & 2: 0
SUM OF ALL RESPONSES TO PHQ QUESTIONS 1-9: 0
2. FEELING DOWN, DEPRESSED OR HOPELESS: 0
SUM OF ALL RESPONSES TO PHQ QUESTIONS 1-9: 0

## 2023-02-20 ASSESSMENT — ANXIETY QUESTIONNAIRES
IF YOU CHECKED OFF ANY PROBLEMS ON THIS QUESTIONNAIRE, HOW DIFFICULT HAVE THESE PROBLEMS MADE IT FOR YOU TO DO YOUR WORK, TAKE CARE OF THINGS AT HOME, OR GET ALONG WITH OTHER PEOPLE: NOT DIFFICULT AT ALL
2. NOT BEING ABLE TO STOP OR CONTROL WORRYING: 0
3. WORRYING TOO MUCH ABOUT DIFFERENT THINGS: 0
6. BECOMING EASILY ANNOYED OR IRRITABLE: 0
4. TROUBLE RELAXING: 0
GAD7 TOTAL SCORE: 0
1. FEELING NERVOUS, ANXIOUS, OR ON EDGE: 0
7. FEELING AFRAID AS IF SOMETHING AWFUL MIGHT HAPPEN: 0
5. BEING SO RESTLESS THAT IT IS HARD TO SIT STILL: 0

## 2023-02-20 NOTE — PROGRESS NOTES
HPI  Senia Wiley comes in for wound dressing. Scalp abscess: Patient was seen previously for scalp abscess. Incision and drainage was done. She has been doing dressing at home and comes in for follow-up care. She is on antibiotics. She is on clindamycin. No obvious drainage from the incision sites though there is still an area that is fluctuant. Patient denies pain or discomfort. She has an appointment to follow-up with a general surgeon. She will continue due to changes of dressing and continue with antibiotic. Diabetes mellitus type 2: Patient has type 2 diabetes mellitus. We started her on glargine. She does not have any blood glucose log at the moment. Did not have the insulin pens. Prescription will be sent in. We discussed lifestyle and dietary modification. She is also on glipizide, Januvia and metformin. She will continue with the medications. May need to be seen by the endocrinologist.  Her HbA1c was 10.2. Hypertension: Patient has hypertension. Blood pressure is stable. Denies headache, changes in vision or focal weakness. Patient is on amlodipine, losartan. Past Medical History  Past Medical History:   Diagnosis Date    Depression     Diabetes ([facility] [address]     Hyperlipidemia     Hypertension     Stroke [facility])     2012       Surgical History  No past surgical history on file.      Medications  Current Outpatient Medications   Medication Sig Dispense Refill    Insulin Pen Needle (PEN NEEDLES 3/16\") 31G X 5 MM MISC Use to administer insulin daily 100 each 3    aspirin 81 MG EC tablet Take 1 tablet by mouth daily 90 tablet 1    insulin glargine (LANTUS SOLOSTAR) 100 UNIT/ML injection pen Inject 15 Units into the skin nightly 5 Adjustable Dose Pre-filled Pen Syringe 3    clindamycin (CLEOCIN) 300 MG capsule Take 1 capsule by mouth 3 times daily for 10 days 30 capsule 0    blood glucose monitor kit and supplies Dispense sufficient amount for indicated testing frequency plus additional to accommodate PRN testing needs. Dispense all needed supplies to include: monitor, strips, lancing device, lancets, control solutions, alcohol swabs. Check blood glucose 3 x day. 1 kit 0    amLODIPine (NORVASC) 10 MG tablet TAKE 1 TABLET BY MOUTH DAILY      atorvastatin (LIPITOR) 80 MG tablet Take 80 mg by mouth daily      glipiZIDE (GLUCOTROL) 5 MG tablet TAKE 2 TABLETS BY MOUTH TWICE DAILY      hydroCHLOROthiazide (HYDRODIURIL) 12.5 MG tablet Take 12.5 mg by mouth daily      losartan (COZAAR) 100 MG tablet       meloxicam (MOBIC) 15 MG tablet Take 15 mg by mouth daily      metFORMIN (GLUCOPHAGE) 1000 MG tablet Take 1,000 mg by mouth 2 times daily (with meals)      PARoxetine (PAXIL) 20 MG tablet Take 20 mg by mouth daily      SITagliptin (JANUVIA) 100 MG tablet Take 100 mg by mouth daily       No current facility-administered medications for this visit. Allergies  No Known Allergies    Family History  No family history on file. Social History  Social History     Socioeconomic History    Marital status: Single     Spouse name: Not on file    Number of children: Not on file    Years of education: Not on file    Highest education level: Not on file   Occupational History    Not on file   Tobacco Use    Smoking status: Every Day     Packs/day: 0.50     Types: Cigarettes    Smokeless tobacco: Never   Substance and Sexual Activity    Alcohol use:  Yes     Alcohol/week: 0.0 standard drinks    Drug use: No    Sexual activity: Not on file   Other Topics Concern    Not on file   Social History Narrative    Not on file     Social Determinants of Health     Financial Resource Strain: Not on file   Food Insecurity: Not on file   Transportation Needs: Not on file   Physical Activity: Not on file   Stress: Not on file   Social Connections: Not on file   Intimate Partner Violence: Not on file   Housing Stability: Not on file       Review of Systems  Review of Systems - Review of all systems is negative except as noted above in the HPI. Vital Signs  /66   Pulse 72   Temp 97.7 °F (36.5 °C) (Temporal)   Resp 18   Ht 5' 4\" (1.626 m)   Wt 158 lb (71.7 kg)   SpO2 99%   BMI 27.12 kg/m²       Physical Exam  Physical Examination: General appearance - oriented to person, place, and time  Mental status - affect appropriate to mood  Chest - no tachypnea, retractions or cyanosis  Heart - normal rate, regular rhythm, normal S1, S2, no murmurs, rubs, clicks or gallops, S1 and S2 normal  Neurological - abnormal neurological exam unchanged from prior examinations  Musculoskeletal - osteoarthritic changes noted in both hands  Skin -scalp abscess area antibiotics healing, no redness or erythema. Slight fluctuant area with very minimal serosanguineous fluid noted. Results  No results found for this visit on 02/20/23. ASSESSMENT and PLAN    ICD-10-CM    1. Abscess, scalp  L02.811       2. Type 2 diabetes mellitus with chronic kidney disease, without long-term current use of insulin, unspecified CKD stage (HCC)  E11.22       3. Essential (primary) hypertension  I10          lab results and schedule of future lab studies reviewed with patient  reviewed diet, exercise and weight control  cardiovascular risk and specific lipid/LDL goals reviewed  reviewed medications and side effects in detail  specific diabetic recommendations: low cholesterol diet, weight control and daily exercise discussed, home glucose monitoring emphasized, all medications, side effects and compliance discussed carefully, annual eye examinations at Ophthalmology discussed, glycohemoglobin and other lab monitoring discussed, and long term diabetic complications discussed      I have discussed the diagnosis with the patient and the intended plan of care as seen in the above orders. The patient has received an after-visit summary and questions were answered concerning future plans. I have discussed medication, side effects, and warnings with the patient in detail. The patient verbalized understanding and is in agreement with the plan of care. The patient will contact the office with any additional concerns. Ivan Thompson MD, MD    PLEASE NOTE:   This document has been produced using voice recognition software.  Unrecognized errors in transcription may be present

## 2023-02-20 NOTE — PROGRESS NOTES
1. \"Have you been to the ER, urgent care clinic since your last visit? Hospitalized since your last visit? \"No    2. \"Have you seen or consulted any other health care providers outside of the [address] [place] [address] since your last visit? \" No    3. For patients aged 39-70: Has the patient had a colonoscopy / FIT/ Cologuard? No      If the patient is female:    4. For patients aged 41-77: Has the patient had a mammogram within the past 2 years? No      5.  For patients aged 21-65: Has the patient had a pap smear? yes

## 2023-02-22 ENCOUNTER — TELEPHONE (OUTPATIENT)
Facility: CLINIC | Age: 58
End: 2023-02-22

## 2023-02-22 NOTE — TELEPHONE ENCOUNTER
Refill Request:  One Touch Test Strip  [facility] [address].  [place], [address]

## 2023-02-23 RX ORDER — LIDOCAINE HYDROCHLORIDE 10 MG/ML
5 INJECTION, SOLUTION INFILTRATION; PERINEURAL ONCE
Status: SHIPPED | OUTPATIENT
Start: 2023-02-23

## 2023-02-27 ENCOUNTER — OFFICE VISIT (OUTPATIENT)
Age: 58
End: 2023-02-27
Payer: MEDICARE

## 2023-02-27 VITALS
SYSTOLIC BLOOD PRESSURE: 129 MMHG | WEIGHT: 158 LBS | DIASTOLIC BLOOD PRESSURE: 80 MMHG | HEIGHT: 65 IN | TEMPERATURE: 97 F | OXYGEN SATURATION: 97 % | HEART RATE: 81 BPM | BODY MASS INDEX: 26.33 KG/M2

## 2023-02-27 DIAGNOSIS — I10 ESSENTIAL HYPERTENSION: ICD-10-CM

## 2023-02-27 DIAGNOSIS — R22.0 SCALP MASS: Primary | ICD-10-CM

## 2023-02-27 DIAGNOSIS — L65.9 ALOPECIA: ICD-10-CM

## 2023-02-27 DIAGNOSIS — M79.89 SOFT TISSUE MASS: ICD-10-CM

## 2023-02-27 DIAGNOSIS — E11.21 TYPE 2 DIABETES WITH NEPHROPATHY (HCC): ICD-10-CM

## 2023-02-27 PROCEDURE — 3074F SYST BP LT 130 MM HG: CPT | Performed by: SURGERY

## 2023-02-27 PROCEDURE — 99204 OFFICE O/P NEW MOD 45 MIN: CPT | Performed by: SURGERY

## 2023-02-27 PROCEDURE — 3079F DIAST BP 80-89 MM HG: CPT | Performed by: SURGERY

## 2023-02-27 NOTE — PROGRESS NOTES
General Surgery Consult    Marietta Austin  Admit date: (Not on file)    MRN: [id number]     : 1965     Age: 62 y.o. Attending Physician: Antonella Richards MD [facility]      History of Present Illness:      Marietta Austin is a 62 y.o. female who was referred to me by Dr. Campos Blanco for evaluation of a soft tissue mass located on the posterior scalp. The patient has multiple medical conditions and she stated that that she had this mass for about 1 to 2 years now. He said that the mass is causing gas sometimes itching and in the past she had some minimal drainage from it and feels like it caused crust.  She stated that the mass has been slightly increasing in size and it is now causing alopecia. She said that she lost her hair for more than 1 to 2 years at the site of the mass so this started when the mass started growing according to her. She denies any pain or discomfort. She denies any drainage currently.      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    Type 2 diabetes mellitus with chronic kidney disease ([address] 03/15/2022    Age-related nuclear cataract, bilateral 2021    Long term (current) use of oral hypoglycemic drugs 2021    Open angle with borderline findings, low risk, bilateral 2021    Type 2 diabetes with nephropathy ([address] 10/07/2020    ACP (advance care planning) 2017    Type 2 diabetes mellitus without complication ([address]     Midline low back pain without sciatica 2015    History of CVA (cerebrovascular accident) 2015    Tobacco use disorder 2015    Hyperlipidemia 2015    Essential hypertension 2015    Electrolyte abnormality 2015    Thyroid function test abnormal 2014    Cannabis abuse 2012    Hyperglycemia due to type 2 diabetes mellitus ([address] 1901     Past Medical History:   Diagnosis Date    Depression     Diabetes ([address]     Hyperlipidemia Hypertension     Stroke (HCC)     2012      No past surgical history on file.   Social History     Tobacco Use    Smoking status: Every Day     Packs/day: 0.50     Types: Cigarettes    Smokeless tobacco: Never   Substance Use Topics    Alcohol use: Yes     Comment: Soc      Social History     Tobacco Use   Smoking Status Every Day    Packs/day: 0.50    Types: Cigarettes   Smokeless Tobacco Never     No family history on file.   Current Outpatient Medications   Medication Sig    Insulin Pen Needle (PEN NEEDLES 3/16\") 31G X 5 MM MISC Use to administer insulin daily    aspirin 81 MG EC tablet Take 1 tablet by mouth daily    insulin glargine (LANTUS SOLOSTAR) 100 UNIT/ML injection pen Inject 15 Units into the skin nightly    blood glucose monitor kit and supplies Dispense sufficient amount for indicated testing frequency plus additional to accommodate PRN testing needs. Dispense all needed supplies to include: monitor, strips, lancing device, lancets, control solutions, alcohol swabs. Check blood glucose 3 x day.    amLODIPine (NORVASC) 10 MG tablet TAKE 1 TABLET BY MOUTH DAILY    atorvastatin (LIPITOR) 80 MG tablet Take 80 mg by mouth daily    glipiZIDE (GLUCOTROL) 5 MG tablet TAKE 2 TABLETS BY MOUTH TWICE DAILY    hydroCHLOROthiazide (HYDRODIURIL) 12.5 MG tablet Take 12.5 mg by mouth daily    losartan (COZAAR) 100 MG tablet     meloxicam (MOBIC) 15 MG tablet Take 15 mg by mouth daily    metFORMIN (GLUCOPHAGE) 1000 MG tablet Take 1,000 mg by mouth 2 times daily (with meals)    PARoxetine (PAXIL) 20 MG tablet Take 20 mg by mouth daily    SITagliptin (JANUVIA) 100 MG tablet Take 100 mg by mouth daily     Current Facility-Administered Medications   Medication Dose Route Frequency    lidocaine 1 % injection 5 mL  5 mL IntraDERmal Once      No Known Allergies       Review of Systems:  Pertinent items are noted in the History of Present Illness.    Objective:     /80 (Site: Left Lower Arm, Position: Sitting, Cuff Size:  Small Adult)   Pulse 81   Temp 97 °F (36.1 °C)   Ht 5' 5\" (1.651 m)   Wt 158 lb (71.7 kg)   SpO2 97%   BMI 26.29 kg/m²     Physical Exam:      General:  in no apparent distress, alert, oriented times 3, and afebrile   Eyes:  conjunctivae and sclerae normal, pupils equal, round, reactive to light   Throat & Neck: normal, no erythema or exudates noted. , and no palpable masses   Lungs:   clear to auscultation bilaterally   Heart:  Regular rate and rhythm   Abdomen:   flat, soft, nontender, nondistended, no masses or organomegaly. Extremities: extremities normal, atraumatic, no cyanosis or edema   Scalp: There is alopecia and an area of around 2 inches around. There is a soft tissue mass about 2 inches in an oblong fashion located at the site of the alopecia. The soft tissue mass is soft and seems to be filled with fluids consistent with a cyst.  It is softer than a sebaceous cyst or a lipoma. There is multiple skin changes with some scarring overlying the mass. Imaging and Lab Review:     CBC:   Lab Results   Component Value Date/Time    WBC 11.1 10/06/2021 09:06 AM    RBC 4.78 10/06/2021 09:06 AM    HGB 13.5 10/06/2021 09:06 AM    HCT 42.1 10/06/2021 09:06 AM     10/06/2021 09:06 AM     BMP:   Lab Results   Component Value Date/Time     10/06/2021 09:06 AM    K 4.4 10/06/2021 09:06 AM     10/06/2021 09:06 AM    CO2 30 10/06/2021 09:06 AM    BUN 10 10/06/2021 09:06 AM     CMP:  Lab Results   Component Value Date/Time     10/06/2021 09:06 AM    K 4.4 10/06/2021 09:06 AM     10/06/2021 09:06 AM    CO2 30 10/06/2021 09:06 AM    BUN 10 10/06/2021 09:06 AM    GLOB 3.7 10/06/2021 09:06 AM       No results found for this or any previous visit (from the past 24 hour(s)). images and reports reviewed    Assessment:   Casandra Aguero is a 62 y.o. female who is presenting with a soft tissue mass of the scalp area that has been present for about 2 years now.   I explained to the patient that I am not sure if this is a cyst filled with fluid or a sebaceous cyst or lipoma but because it has been there for 2 years it looks benign. Also there is alopecia over the mass so I am guessing there are some skin changes as well and I gave her the option of observation and follow-up with dermatology for her alopecia versus doing an imaging study to see what it is. The mass is not bothering the patient currently and she would like to just observe it and she will follow-up with me in 3 months.     Plan:     Observation  Follow-up in 3 months      Please call me if you have any questions (cell phone: [phone])     Signed By: Christie Elizondo MD     February 27, 2023

## 2023-02-27 NOTE — PROGRESS NOTES
Michelle Meneses is a 62 y.o. female (: 1965) presenting to address:    Chief Complaint   Patient presents with    New Patient     Mass on mid upper scalp/ referred by Dr. Dewayne Camarena       Medication list and allergies have been reviewed with Michelle Meneses and updated as of today's date. I have gone over all Medical, Surgical and Social History with Michelle Meneses and updated/added the information accordingly.

## 2023-02-28 ENCOUNTER — TELEPHONE (OUTPATIENT)
Facility: CLINIC | Age: 58
End: 2023-02-28

## 2023-02-28 DIAGNOSIS — E11.9 TYPE 2 DIABETES MELLITUS WITHOUT COMPLICATION, WITHOUT LONG-TERM CURRENT USE OF INSULIN (HCC): ICD-10-CM

## 2023-03-01 RX ORDER — LANCETS 30 GAUGE
1 EACH MISCELLANEOUS 3 TIMES DAILY
Qty: 300 EACH | Refills: 3 | Status: SHIPPED | OUTPATIENT
Start: 2023-03-01

## 2023-03-28 ENCOUNTER — OFFICE VISIT (OUTPATIENT)
Facility: CLINIC | Age: 58
End: 2023-03-28

## 2023-03-28 VITALS
TEMPERATURE: 97.7 F | HEIGHT: 65 IN | HEART RATE: 83 BPM | OXYGEN SATURATION: 98 % | BODY MASS INDEX: 26.66 KG/M2 | RESPIRATION RATE: 18 BRPM | DIASTOLIC BLOOD PRESSURE: 67 MMHG | SYSTOLIC BLOOD PRESSURE: 137 MMHG | WEIGHT: 160 LBS

## 2023-03-28 DIAGNOSIS — E11.65 TYPE 2 DIABETES MELLITUS WITH HYPERGLYCEMIA, WITH LONG-TERM CURRENT USE OF INSULIN (HCC): Primary | ICD-10-CM

## 2023-03-28 DIAGNOSIS — Z12.11 SCREEN FOR COLON CANCER: ICD-10-CM

## 2023-03-28 DIAGNOSIS — I10 ESSENTIAL (PRIMARY) HYPERTENSION: ICD-10-CM

## 2023-03-28 DIAGNOSIS — E78.49 OTHER HYPERLIPIDEMIA: ICD-10-CM

## 2023-03-28 DIAGNOSIS — M54.50 CHRONIC MIDLINE LOW BACK PAIN, UNSPECIFIED WHETHER SCIATICA PRESENT: ICD-10-CM

## 2023-03-28 DIAGNOSIS — G89.29 CHRONIC MIDLINE LOW BACK PAIN, UNSPECIFIED WHETHER SCIATICA PRESENT: ICD-10-CM

## 2023-03-28 DIAGNOSIS — Z79.4 TYPE 2 DIABETES MELLITUS WITH HYPERGLYCEMIA, WITH LONG-TERM CURRENT USE OF INSULIN (HCC): Primary | ICD-10-CM

## 2023-03-28 DIAGNOSIS — L02.811 ABSCESS, SCALP: ICD-10-CM

## 2023-03-28 SDOH — ECONOMIC STABILITY: FOOD INSECURITY: WITHIN THE PAST 12 MONTHS, THE FOOD YOU BOUGHT JUST DIDN'T LAST AND YOU DIDN'T HAVE MONEY TO GET MORE.: NEVER TRUE

## 2023-03-28 SDOH — ECONOMIC STABILITY: HOUSING INSECURITY
IN THE LAST 12 MONTHS, WAS THERE A TIME WHEN YOU DID NOT HAVE A STEADY PLACE TO SLEEP OR SLEPT IN A SHELTER (INCLUDING NOW)?: NO

## 2023-03-28 SDOH — ECONOMIC STABILITY: FOOD INSECURITY: WITHIN THE PAST 12 MONTHS, YOU WORRIED THAT YOUR FOOD WOULD RUN OUT BEFORE YOU GOT MONEY TO BUY MORE.: NEVER TRUE

## 2023-03-28 SDOH — ECONOMIC STABILITY: INCOME INSECURITY: HOW HARD IS IT FOR YOU TO PAY FOR THE VERY BASICS LIKE FOOD, HOUSING, MEDICAL CARE, AND HEATING?: NOT VERY HARD

## 2023-03-28 ASSESSMENT — PATIENT HEALTH QUESTIONNAIRE - PHQ9
SUM OF ALL RESPONSES TO PHQ QUESTIONS 1-9: 0
SUM OF ALL RESPONSES TO PHQ QUESTIONS 1-9: 0
2. FEELING DOWN, DEPRESSED OR HOPELESS: 0
1. LITTLE INTEREST OR PLEASURE IN DOING THINGS: 0
SUM OF ALL RESPONSES TO PHQ QUESTIONS 1-9: 0
SUM OF ALL RESPONSES TO PHQ9 QUESTIONS 1 & 2: 0
SUM OF ALL RESPONSES TO PHQ QUESTIONS 1-9: 0

## 2023-03-28 NOTE — PROGRESS NOTES
1. \"Have you been to the ER, urgent care clinic since your last visit? Hospitalized since your last visit? \"No    2. \"Have you seen or consulted any other health care providers outside of the [address] [place] [address] since your last visit? \" No    3. For patients aged 39-70: Has the patient had a colonoscopy / FIT/ Cologuard? No      If the patient is female:    4. For patients aged 41-77: Has the patient had a mammogram within the past 2 years? No      5. For patients aged 21-65: Has the patient had a pap smear?  Not applicable
3/16\") 31G X 5 MM MISC Use to administer insulin daily 100 each 3    aspirin 81 MG EC tablet Take 1 tablet by mouth daily 90 tablet 1    insulin glargine (LANTUS SOLOSTAR) 100 UNIT/ML injection pen Inject 15 Units into the skin nightly 5 Adjustable Dose Pre-filled Pen Syringe 3    blood glucose monitor kit and supplies Dispense sufficient amount for indicated testing frequency plus additional to accommodate PRN testing needs. Dispense all needed supplies to include: monitor, strips, lancing device, lancets, control solutions, alcohol swabs. Check blood glucose 3 x day. 1 kit 0    amLODIPine (NORVASC) 10 MG tablet TAKE 1 TABLET BY MOUTH DAILY      atorvastatin (LIPITOR) 80 MG tablet Take 80 mg by mouth daily      glipiZIDE (GLUCOTROL) 5 MG tablet TAKE 2 TABLETS BY MOUTH TWICE DAILY      hydroCHLOROthiazide (HYDRODIURIL) 12.5 MG tablet Take 12.5 mg by mouth daily      losartan (COZAAR) 100 MG tablet  (Patient not taking: Reported on 3/28/2023)      meloxicam (MOBIC) 15 MG tablet Take 15 mg by mouth daily (Patient not taking: Reported on 3/28/2023)      metFORMIN (GLUCOPHAGE) 1000 MG tablet Take 1,000 mg by mouth 2 times daily (with meals) (Patient not taking: Reported on 3/28/2023)      PARoxetine (PAXIL) 20 MG tablet Take 20 mg by mouth daily (Patient not taking: Reported on 3/28/2023)      SITagliptin (JANUVIA) 100 MG tablet Take 100 mg by mouth daily (Patient not taking: Reported on 3/28/2023)       Current Facility-Administered Medications   Medication Dose Route Frequency Provider Last Rate Last Admin    lidocaine 1 % injection 5 mL  5 mL IntraDERmal Once Maxwelled CHRISTINA Killian MD           Allergies  No Known Allergies    Family History  History reviewed. No pertinent family history.     Social History  Social History     Socioeconomic History    Marital status: Single     Spouse name: Not on file    Number of children: Not on file    Years of education: Not on file    Highest education level: Not on file

## 2023-03-31 LAB
ALBUMIN SERPL-MCNC: 4.7 G/DL (ref 3.8–4.9)
ALBUMIN/GLOB SERPL: 1.7 {RATIO} (ref 1.2–2.2)
ALP SERPL-CCNC: 123 IU/L (ref 44–121)
ALT SERPL-CCNC: 26 IU/L (ref 0–32)
AST SERPL-CCNC: 21 IU/L (ref 0–40)
BASOPHILS # BLD AUTO: 0.1 X10E3/UL (ref 0–0.2)
BASOPHILS NFR BLD AUTO: 0 %
BILIRUB SERPL-MCNC: 0.5 MG/DL (ref 0–1.2)
BUN SERPL-MCNC: 8 MG/DL (ref 6–24)
BUN/CREAT SERPL: 12 (ref 9–23)
CALCIUM SERPL-MCNC: 10.2 MG/DL (ref 8.7–10.2)
CHLORIDE SERPL-SCNC: 99 MMOL/L (ref 96–106)
CHOLEST SERPL-MCNC: 144 MG/DL (ref 100–199)
CO2 SERPL-SCNC: 21 MMOL/L (ref 20–29)
CREAT SERPL-MCNC: 0.69 MG/DL (ref 0.57–1)
EGFRCR SERPLBLD CKD-EPI 2021: 101 ML/MIN/1.73
EOSINOPHIL # BLD AUTO: 0.1 X10E3/UL (ref 0–0.4)
EOSINOPHIL NFR BLD AUTO: 1 %
ERYTHROCYTE [DISTWIDTH] IN BLOOD BY AUTOMATED COUNT: 12.9 % (ref 11.7–15.4)
GLOBULIN SER CALC-MCNC: 2.7 G/DL (ref 1.5–4.5)
GLUCOSE SERPL-MCNC: 91 MG/DL (ref 70–99)
HCT VFR BLD AUTO: 43.3 % (ref 34–46.6)
HCV IGG SERPL QL IA: NON REACTIVE
HDLC SERPL-MCNC: 59 MG/DL
HGB BLD-MCNC: 15 G/DL (ref 11.1–15.9)
HIV 1+2 AB+HIV1 P24 AG SERPL QL IA: NON REACTIVE
IMM GRANULOCYTES # BLD AUTO: 0 X10E3/UL (ref 0–0.1)
IMM GRANULOCYTES NFR BLD AUTO: 0 %
LDLC SERPL CALC-MCNC: 69 MG/DL (ref 0–99)
LYMPHOCYTES # BLD AUTO: 5.7 X10E3/UL (ref 0.7–3.1)
LYMPHOCYTES NFR BLD AUTO: 43 %
MCH RBC QN AUTO: 29.7 PG (ref 26.6–33)
MCHC RBC AUTO-ENTMCNC: 34.6 G/DL (ref 31.5–35.7)
MCV RBC AUTO: 86 FL (ref 79–97)
MONOCYTES # BLD AUTO: 0.9 X10E3/UL (ref 0.1–0.9)
MONOCYTES NFR BLD AUTO: 7 %
NEUTROPHILS # BLD AUTO: 6.5 X10E3/UL (ref 1.4–7)
NEUTROPHILS NFR BLD AUTO: 49 %
PLATELET # BLD AUTO: 256 X10E3/UL (ref 150–450)
POTASSIUM SERPL-SCNC: 3.4 MMOL/L (ref 3.5–5.2)
PROT SERPL-MCNC: 7.4 G/DL (ref 6–8.5)
RBC # BLD AUTO: 5.05 X10E6/UL (ref 3.77–5.28)
SODIUM SERPL-SCNC: 141 MMOL/L (ref 134–144)
SPECIMEN STATUS REPORT: NORMAL
TRIGL SERPL-MCNC: 87 MG/DL (ref 0–149)
VLDLC SERPL CALC-MCNC: 16 MG/DL (ref 5–40)
WBC # BLD AUTO: 13.4 X10E3/UL (ref 3.4–10.8)

## 2023-04-02 DIAGNOSIS — E87.6 HYPOKALEMIA: ICD-10-CM

## 2023-04-02 DIAGNOSIS — D72.829 LEUKOCYTOSIS, UNSPECIFIED TYPE: Primary | ICD-10-CM

## 2023-04-02 RX ORDER — POTASSIUM CHLORIDE 750 MG/1
10 TABLET, EXTENDED RELEASE ORAL DAILY
Qty: 30 TABLET | Refills: 5 | Status: SHIPPED | OUTPATIENT
Start: 2023-04-02

## 2023-04-03 RX ORDER — HYDROCHLOROTHIAZIDE 12.5 MG/1
TABLET ORAL
Qty: 90 TABLET | Refills: 1 | Status: SHIPPED | OUTPATIENT
Start: 2023-04-03

## 2023-04-13 ENCOUNTER — TELEPHONE (OUTPATIENT)
Facility: CLINIC | Age: 58
End: 2023-04-13

## 2023-07-06 ENCOUNTER — OFFICE VISIT (OUTPATIENT)
Facility: CLINIC | Age: 58
End: 2023-07-06
Payer: MEDICARE

## 2023-07-06 VITALS
BODY MASS INDEX: 25.83 KG/M2 | WEIGHT: 155 LBS | HEIGHT: 65 IN | RESPIRATION RATE: 20 BRPM | SYSTOLIC BLOOD PRESSURE: 138 MMHG | OXYGEN SATURATION: 99 % | TEMPERATURE: 98.1 F | HEART RATE: 73 BPM | DIASTOLIC BLOOD PRESSURE: 77 MMHG

## 2023-07-06 DIAGNOSIS — N95.1 MENOPAUSAL AND FEMALE CLIMACTERIC STATES: ICD-10-CM

## 2023-07-06 DIAGNOSIS — R10.84 GENERALIZED ABDOMINAL PAIN: ICD-10-CM

## 2023-07-06 DIAGNOSIS — E11.65 TYPE 2 DIABETES MELLITUS WITH HYPERGLYCEMIA, WITH LONG-TERM CURRENT USE OF INSULIN (HCC): Primary | ICD-10-CM

## 2023-07-06 DIAGNOSIS — E78.49 OTHER HYPERLIPIDEMIA: ICD-10-CM

## 2023-07-06 DIAGNOSIS — I10 ESSENTIAL (PRIMARY) HYPERTENSION: ICD-10-CM

## 2023-07-06 DIAGNOSIS — Z79.4 TYPE 2 DIABETES MELLITUS WITH HYPERGLYCEMIA, WITH LONG-TERM CURRENT USE OF INSULIN (HCC): Primary | ICD-10-CM

## 2023-07-06 PROCEDURE — 99214 OFFICE O/P EST MOD 30 MIN: CPT | Performed by: FAMILY MEDICINE

## 2023-07-06 PROCEDURE — 3078F DIAST BP <80 MM HG: CPT | Performed by: FAMILY MEDICINE

## 2023-07-06 PROCEDURE — 3074F SYST BP LT 130 MM HG: CPT | Performed by: FAMILY MEDICINE

## 2023-07-06 RX ORDER — LOSARTAN POTASSIUM 100 MG/1
100 TABLET ORAL DAILY
Qty: 90 TABLET | Refills: 1 | Status: SHIPPED | OUTPATIENT
Start: 2023-07-06

## 2023-07-06 SDOH — ECONOMIC STABILITY: INCOME INSECURITY: HOW HARD IS IT FOR YOU TO PAY FOR THE VERY BASICS LIKE FOOD, HOUSING, MEDICAL CARE, AND HEATING?: NOT VERY HARD

## 2023-07-06 SDOH — ECONOMIC STABILITY: FOOD INSECURITY: WITHIN THE PAST 12 MONTHS, THE FOOD YOU BOUGHT JUST DIDN'T LAST AND YOU DIDN'T HAVE MONEY TO GET MORE.: NEVER TRUE

## 2023-07-06 SDOH — ECONOMIC STABILITY: FOOD INSECURITY: WITHIN THE PAST 12 MONTHS, YOU WORRIED THAT YOUR FOOD WOULD RUN OUT BEFORE YOU GOT MONEY TO BUY MORE.: NEVER TRUE

## 2023-07-06 ASSESSMENT — PATIENT HEALTH QUESTIONNAIRE - PHQ9
SUM OF ALL RESPONSES TO PHQ QUESTIONS 1-9: 0
2. FEELING DOWN, DEPRESSED OR HOPELESS: 0
1. LITTLE INTEREST OR PLEASURE IN DOING THINGS: 0
SUM OF ALL RESPONSES TO PHQ QUESTIONS 1-9: 0
SUM OF ALL RESPONSES TO PHQ9 QUESTIONS 1 & 2: 0

## 2023-07-10 RX ORDER — GLIPIZIDE 5 MG/1
TABLET ORAL
Qty: 360 TABLET | Refills: 1 | Status: SHIPPED | OUTPATIENT
Start: 2023-07-10

## 2023-07-18 ENCOUNTER — NURSE ONLY (OUTPATIENT)
Age: 58
End: 2023-07-18

## 2023-07-18 VITALS
TEMPERATURE: 97.5 F | HEART RATE: 72 BPM | BODY MASS INDEX: 25.66 KG/M2 | RESPIRATION RATE: 17 BRPM | OXYGEN SATURATION: 100 % | HEIGHT: 65 IN | WEIGHT: 154 LBS

## 2023-07-18 DIAGNOSIS — Z12.11 COLON CANCER SCREENING: ICD-10-CM

## 2023-07-18 DIAGNOSIS — E11.9 TYPE 2 DIABETES MELLITUS WITHOUT COMPLICATION, WITHOUT LONG-TERM CURRENT USE OF INSULIN (HCC): ICD-10-CM

## 2023-07-18 DIAGNOSIS — I10 ESSENTIAL HYPERTENSION: Primary | ICD-10-CM

## 2023-07-18 NOTE — PROGRESS NOTES
Colon Screen    Patient: Brian Mullins MRN: [id number]  SSN: [id number]    YOB: 1965  Age: 62 y.o. Sex: female        Subjective:   Brian Mullins was referred by her PCP, Kenia Chirinos MD.  Patient referred for colonoscopy for   Screening colonoscopy. Patient denies rectal pain or bleeding. Abdominal surgeries as described below, specifically none. Family history as described below, specifically none. Patient has never had a colonoscopy. No Known Allergies    Past Medical History:   Diagnosis Date    Depression     Diabetes ([address] [facility])     Hyperlipidemia     Hypertension     Stroke ([address] [facility])     2012     No past surgical history on file. No family history on file. Social History     Tobacco Use    Smoking status: Every Day     Packs/day: 0.50     Years: 40.00     Pack years: 20.00     Types: Cigarettes    Smokeless tobacco: Never   Substance Use Topics    Alcohol use: Yes     Comment: Soc      Prior to Admission medications    Medication Sig Start Date End Date Taking?  Authorizing Provider   glipiZIDE (GLUCOTROL) 5 MG tablet TAKE 2 TABLETS BY MOUTH TWICE DAILY 7/10/23  Yes Hesed Juana Tello MD   metFORMIN (GLUCOPHAGE) 1000 MG tablet Take 1 tablet by mouth 2 times daily (with meals) 7/6/23  Yes Maxwelled Juana Tello MD   SITagliptin (JANUVIA) 100 MG tablet Take 1 tablet by mouth daily 7/6/23  Yes Maxime Tello MD   losartan (COZAAR) 100 MG tablet Take 1 tablet by mouth daily 7/6/23  Yes Hesed Juana Tello MD   dicyclomine (BENTYL) 10 MG capsule Take 1 capsule by mouth 3 times daily as needed (pain) 6/14/23  Yes Hesnadine Tello MD   hydroCHLOROthiazide (HYDRODIURIL) 12.5 MG tablet TAKE 1 TABLET BY MOUTH DAILY 4/3/23  Yes Maxime Tello MD   potassium chloride (KLOR-CON M) 10 MEQ extended release tablet Take 1 tablet by mouth daily 4/2/23  Yes Maxime Tello MD   blood glucose test strips (ASCENSIA AUTODISC VI;ONE TOUCH ULTRA TEST VI) strip 1 each by In Vitro route daily Check blood glucose 3 times

## 2023-11-13 ENCOUNTER — OFFICE VISIT (OUTPATIENT)
Facility: CLINIC | Age: 58
End: 2023-11-13

## 2023-11-13 VITALS
WEIGHT: 157 LBS | SYSTOLIC BLOOD PRESSURE: 138 MMHG | HEART RATE: 80 BPM | HEIGHT: 65 IN | BODY MASS INDEX: 26.16 KG/M2 | DIASTOLIC BLOOD PRESSURE: 72 MMHG | RESPIRATION RATE: 20 BRPM | OXYGEN SATURATION: 98 % | TEMPERATURE: 97.8 F

## 2023-11-13 DIAGNOSIS — Z79.4 TYPE 2 DIABETES MELLITUS WITH HYPERGLYCEMIA, WITH LONG-TERM CURRENT USE OF INSULIN (HCC): ICD-10-CM

## 2023-11-13 DIAGNOSIS — I10 ESSENTIAL (PRIMARY) HYPERTENSION: ICD-10-CM

## 2023-11-13 DIAGNOSIS — Z00.00 MEDICARE ANNUAL WELLNESS VISIT, SUBSEQUENT: Primary | ICD-10-CM

## 2023-11-13 DIAGNOSIS — E11.9 COMPREHENSIVE DIABETIC FOOT EXAMINATION, TYPE 2 DM, ENCOUNTER FOR (HCC): ICD-10-CM

## 2023-11-13 DIAGNOSIS — Z12.11 SCREEN FOR COLON CANCER: ICD-10-CM

## 2023-11-13 DIAGNOSIS — F17.200 TOBACCO USE DISORDER: ICD-10-CM

## 2023-11-13 DIAGNOSIS — E11.65 TYPE 2 DIABETES MELLITUS WITH HYPERGLYCEMIA, WITH LONG-TERM CURRENT USE OF INSULIN (HCC): ICD-10-CM

## 2023-11-13 DIAGNOSIS — Z87.891 PERSONAL HISTORY OF TOBACCO USE: ICD-10-CM

## 2023-11-13 DIAGNOSIS — E78.5 HYPERLIPIDEMIA, UNSPECIFIED HYPERLIPIDEMIA TYPE: ICD-10-CM

## 2023-11-13 LAB — HBA1C MFR BLD: 9 %

## 2023-11-13 SDOH — ECONOMIC STABILITY: INCOME INSECURITY: HOW HARD IS IT FOR YOU TO PAY FOR THE VERY BASICS LIKE FOOD, HOUSING, MEDICAL CARE, AND HEATING?: NOT VERY HARD

## 2023-11-13 SDOH — ECONOMIC STABILITY: FOOD INSECURITY: WITHIN THE PAST 12 MONTHS, THE FOOD YOU BOUGHT JUST DIDN'T LAST AND YOU DIDN'T HAVE MONEY TO GET MORE.: NEVER TRUE

## 2023-11-13 SDOH — ECONOMIC STABILITY: FOOD INSECURITY: WITHIN THE PAST 12 MONTHS, YOU WORRIED THAT YOUR FOOD WOULD RUN OUT BEFORE YOU GOT MONEY TO BUY MORE.: NEVER TRUE

## 2023-11-13 ASSESSMENT — PATIENT HEALTH QUESTIONNAIRE - PHQ9
SUM OF ALL RESPONSES TO PHQ9 QUESTIONS 1 & 2: 0
2. FEELING DOWN, DEPRESSED OR HOPELESS: 0
SUM OF ALL RESPONSES TO PHQ QUESTIONS 1-9: 0
1. LITTLE INTEREST OR PLEASURE IN DOING THINGS: 0
SUM OF ALL RESPONSES TO PHQ QUESTIONS 1-9: 0

## 2023-11-13 ASSESSMENT — LIFESTYLE VARIABLES
HOW MANY STANDARD DRINKS CONTAINING ALCOHOL DO YOU HAVE ON A TYPICAL DAY: 1 OR 2
HOW OFTEN DO YOU HAVE A DRINK CONTAINING ALCOHOL: MONTHLY OR LESS

## 2023-11-13 NOTE — PROGRESS NOTES
HPI  Ashton Jeans comes in follow-up care. Diabetes mellitus type 2: Patient has type 2 diabetes mellitus. Blood glucose numbers have been fluctuating and uncontrolled. She is not doing lifestyle or dietary modification. She is not taking medication as prescribed. Patient has been prescribed insulin, glipizide, metformin and Januvia. She is not taking the metformin or Januvia. HbA1c is elevated today. It is up at 9.0. Previously was 7.6. I did emphasize the need to take medication as prescribed. I will send in a refill of the metformin and the Januvia. She will continue to take the glipizide and insulin. She will keep a blood glucose log. She will intensify lifestyle and dietary modification. I will follow-up at next visit in 1 month. If still elevated may need to see the endocrinologist.  I did a foot exam that is reassuring. Hypertension: Patient has hypertension. She is on HCTZ and the amlodipine. Blood pressure is stable. She will continue current treatment plan. Dyslipidemia: Patient has dyslipidemia. She takes atorvastatin 80 mg daily. She will exercise and take a diet low in polysaturated fats. We will continue with the current treatment plan. Health maintenance: Patient has been referred in the past to get a screening colonoscopy. She has not gone for scheduled appointments. We discussed doing the FIT stool kit test.  She would prefer to do a colonoscopy. Referral is placed. Patient has also been referred to get a mammogram.  He is yet to do this. Number to call to set up an appointment was given. Past Medical History  Past Medical History:   Diagnosis Date    Depression     Diabetes ([address])     Hyperlipidemia     Hypertension     Stroke [facility])     2012       Surgical History  History reviewed. No pertinent surgical history.      Medications  Current Outpatient Medications   Medication Sig Dispense Refill    metFORMIN (GLUCOPHAGE) 1000 MG tablet Take 1 tablet by mouth 2 times

## 2023-11-13 NOTE — ACP (ADVANCE CARE PLANNING)
Advance Care Planning     Advance Care Planning (ACP) Physician/NP/PA (Provider) Conversation      Date of ACP Conversation: 11/13/2023    Conversation Conducted with:   Patient with Decision Making [facility] Decision Maker:    Current Designated Health Care Decision Maker:    Primary Decision Maker (Active): Joanna Thomas - [phone]    Care Preferences:    Hospitalization: \"If your health worsens and it becomes clear that your chance of recovery is unlikely, what would your preference be regarding hospitalization? \"  If the patient would want hospitalization, answer \"yes\". If the patient would prefer comfort-focused treatment without hospitalization, answer \"no\". YES/NO/WILD CARDS: yes      Ventilation: \"If you were in your present state of health and suddenly became very ill and were unable to breathe on your own, what would your preference be about the use of a ventilator (breathing machine) if it was available to you? \"    If patient would desire the use of a ventilator (breathing machine), answer \"yes\", if not answer \"no\":yes    \"If your health worsens and it becomes clear that your chance of recovery is unlikely, what would your preference be about the use of a ventilator (breathing machine) if it was available to you? \"   yes    Resuscitation:  \"CPR works best to restart the heart when there is a sudden event, like a heart attack, in someone who is otherwise healthy. Unfortunately, CPR does not typically restart the heart for people who have serious health conditions or who are very sick. \"    \"In the event your heart stopped as a result of an underlying serious health condition, would you want attempts to be made to restart your heart (answer \"yes\" for attempt to resuscitate) or would you prefer a natural death (answer \"no\" for do not attempt to resuscitate)? \"   yes       Conversation Outcomes / Follow-Up Plan:   Recommended completion of Advance Directive      Length of Voluntary ACP

## 2023-11-14 ENCOUNTER — TRANSCRIBE ORDERS (OUTPATIENT)
Facility: HOSPITAL | Age: 58
End: 2023-11-14

## 2023-11-14 DIAGNOSIS — Z12.31 VISIT FOR SCREENING MAMMOGRAM: Primary | ICD-10-CM

## 2023-11-17 NOTE — PROGRESS NOTES
Medicare Annual Wellness Visit    Julisa Carrizales is here for Medicare AWV, Follow-up Chronic Condition, and Diabetes    Assessment & Plan    Diagnosis Orders   1. Medicare annual wellness visit, subsequent        2. Type 2 diabetes mellitus with hyperglycemia, with long-term current use of insulin (HCC)  AMB POC HEMOGLOBIN A1C     DIABETES FOOT EXAM    External Referral To Ophthalmology    metFORMIN (GLUCOPHAGE) 1000 MG tablet    SITagliptin (JANUVIA) 100 MG tablet      3. Screen for colon cancer  External Referral To Gastroenterology      4. Comprehensive diabetic foot examination, type 2 DM, encounter for ([address])   DIABETES FOOT EXAM      5. Essential (primary) hypertension        6. Hyperlipidemia, unspecified hyperlipidemia type        7. Tobacco use disorder        8. Personal history of tobacco use  TX VISIT TO DISCUSS LUNG CA SCREEN W LDCT    CT Lung Screen (Initial/Annual/Baseline)        Recommendations for Preventive Services Due: see orders and patient instructions/AVS.  Recommended screening schedule for the next 5-10 years is provided to the patient in written form: see Patient Instructions/AVS.     Return in about 1 month (around 12/13/2023), or if symptoms worsen or fail to improve, for Diabetes Mellitus, Hypertension. Subjective   Julisa Carrizales comes in for medicare wellness exam.      Patient's complete Health Risk Assessment and screening values have been reviewed and are found in Flowsheets. The following problems were reviewed today and where indicated follow up appointments were made and/or referrals ordered.     Positive Risk Factor Screenings with Interventions:                  Dentist Screen:  Have you seen the dentist within the past year?: (!) No    Intervention:  Advised to schedule with their dentist     Vision Screen:  Do you have difficulty driving, watching TV, or doing any of your daily activities because of your eyesight?: No  Have you had an eye exam within the past year?: (!)

## 2023-12-05 ENCOUNTER — HOSPITAL ENCOUNTER (OUTPATIENT)
Facility: HOSPITAL | Age: 58
Discharge: HOME | End: 2023-12-08
Payer: MEDICARE

## 2023-12-05 VITALS — WEIGHT: 160 LBS | BODY MASS INDEX: 26.66 KG/M2 | HEIGHT: 65 IN

## 2023-12-05 DIAGNOSIS — Z12.31 VISIT FOR SCREENING MAMMOGRAM: ICD-10-CM

## 2023-12-05 PROCEDURE — 77063 BREAST TOMOSYNTHESIS BI: CPT

## 2023-12-11 ENCOUNTER — OFFICE VISIT (OUTPATIENT)
Facility: CLINIC | Age: 58
End: 2023-12-11
Payer: MEDICARE

## 2023-12-11 VITALS
BODY MASS INDEX: 25.99 KG/M2 | HEIGHT: 65 IN | HEART RATE: 64 BPM | OXYGEN SATURATION: 100 % | TEMPERATURE: 97.9 F | RESPIRATION RATE: 20 BRPM | WEIGHT: 156 LBS | DIASTOLIC BLOOD PRESSURE: 76 MMHG | SYSTOLIC BLOOD PRESSURE: 127 MMHG

## 2023-12-11 DIAGNOSIS — Z12.11 SCREEN FOR COLON CANCER: ICD-10-CM

## 2023-12-11 DIAGNOSIS — E78.5 HYPERLIPIDEMIA, UNSPECIFIED HYPERLIPIDEMIA TYPE: ICD-10-CM

## 2023-12-11 DIAGNOSIS — I10 ESSENTIAL (PRIMARY) HYPERTENSION: ICD-10-CM

## 2023-12-11 DIAGNOSIS — E11.65 TYPE 2 DIABETES MELLITUS WITH HYPERGLYCEMIA, WITH LONG-TERM CURRENT USE OF INSULIN (HCC): Primary | ICD-10-CM

## 2023-12-11 DIAGNOSIS — Z79.4 TYPE 2 DIABETES MELLITUS WITH HYPERGLYCEMIA, WITH LONG-TERM CURRENT USE OF INSULIN (HCC): Primary | ICD-10-CM

## 2023-12-11 LAB — HBA1C MFR BLD: 9.4 %

## 2023-12-11 PROCEDURE — 3074F SYST BP LT 130 MM HG: CPT | Performed by: FAMILY MEDICINE

## 2023-12-11 PROCEDURE — 83036 HEMOGLOBIN GLYCOSYLATED A1C: CPT | Performed by: FAMILY MEDICINE

## 2023-12-11 PROCEDURE — 99214 OFFICE O/P EST MOD 30 MIN: CPT | Performed by: FAMILY MEDICINE

## 2023-12-11 PROCEDURE — 3078F DIAST BP <80 MM HG: CPT | Performed by: FAMILY MEDICINE

## 2023-12-11 RX ORDER — DULAGLUTIDE 0.75 MG/.5ML
0.75 INJECTION, SOLUTION SUBCUTANEOUS WEEKLY
Qty: 6 ML | Refills: 2 | Status: SHIPPED | OUTPATIENT
Start: 2023-12-11

## 2023-12-11 NOTE — PROGRESS NOTES
JHONY Oliverosjose Szymanski comes in for follow up care. HTN: Patient has hypertension. Blood pressure is stable. Denies headache, changes in vision or focal weakness. She is on HCTZ, amlodipine. We will continue with these medications. She will take a low-sodium diet. DM2: Patient has type 2 diabetes mellitus. This has been uncontrolled. Last HbA1c was 9.0. Today it is up at 9.4. Patient is on glipizide, Januvia, metformin and insulin. Patient will be referred to see the endocrinologist due to uncontrolled diabetes mellitus. She has not been doing any lifestyle or dietary modification. I did emphasize the need to do this. I will add Trulicity to take weekly. Dyslipidemia: Patient has dyslipidemia. She is on atorvastatin 80 mg daily. Stable on medication. She will access to take a diet low in polysaturated fats. Health maintenance: Patient will get the FIT stool kit for colon cancer screening. Past Medical History  Past Medical History:   Diagnosis Date    Depression     Diabetes ([address] [facility])     Hyperlipidemia     Hypertension     Stroke [facility])     2012       Surgical History  History reviewed. No pertinent surgical history.      Medications  Current Outpatient Medications   Medication Sig Dispense Refill    metFORMIN (GLUCOPHAGE) 1000 MG tablet Take 1 tablet by mouth 2 times daily (with meals) 180 tablet 1    SITagliptin (JANUVIA) 100 MG tablet Take 1 tablet by mouth daily 90 tablet 1    glipiZIDE (GLUCOTROL) 5 MG tablet TAKE 2 TABLETS BY MOUTH TWICE DAILY 360 tablet 1    hydroCHLOROthiazide (HYDRODIURIL) 12.5 MG tablet TAKE 1 TABLET BY MOUTH DAILY 90 tablet 1    potassium chloride (KLOR-CON M) 10 MEQ extended release tablet Take 1 tablet by mouth daily 30 tablet 5    blood glucose test strips (ASCENSIA AUTODISC VI;ONE TOUCH ULTRA TEST VI) strip 1 each by In Vitro route daily Check blood glucose 3 times daily 300 each 3    Lancets MISC 1 each by Does not apply route 3 times daily Check blood glucose 3 times

## 2023-12-11 NOTE — PROGRESS NOTES
1. \"Have you been to the ER, urgent care clinic since your last visit? Hospitalized since your last visit? \"No    2. \"Have you seen or consulted any other health care providers outside of the [address] [place] [address] since your last visit? \" No    3. For patients aged 43-73: Has the patient had a colonoscopy / FIT/ Cologuard? Yes      If the patient is female:    4. For patients aged 43-66: Has the patient had a mammogram within the past 2 years? Yes      5. For patients aged 21-65: Has the patient had a pap smear?  Yes

## 2024-01-07 RX ORDER — AMLODIPINE BESYLATE 10 MG/1
TABLET ORAL
Qty: 90 TABLET | Refills: 1 | Status: SHIPPED | OUTPATIENT
Start: 2024-01-07

## 2024-03-11 ENCOUNTER — OFFICE VISIT (OUTPATIENT)
Facility: CLINIC | Age: 59
End: 2024-03-11
Payer: MEDICARE

## 2024-03-11 VITALS
BODY MASS INDEX: 25.07 KG/M2 | OXYGEN SATURATION: 99 % | HEART RATE: 68 BPM | SYSTOLIC BLOOD PRESSURE: 137 MMHG | TEMPERATURE: 97.8 F | WEIGHT: 156 LBS | DIASTOLIC BLOOD PRESSURE: 58 MMHG | HEIGHT: 66 IN | RESPIRATION RATE: 20 BRPM

## 2024-03-11 DIAGNOSIS — Z79.4 TYPE 2 DIABETES MELLITUS WITH HYPERGLYCEMIA, WITH LONG-TERM CURRENT USE OF INSULIN (HCC): Primary | ICD-10-CM

## 2024-03-11 DIAGNOSIS — E11.9 TYPE 2 DIABETES MELLITUS WITHOUT COMPLICATION, WITHOUT LONG-TERM CURRENT USE OF INSULIN (HCC): ICD-10-CM

## 2024-03-11 DIAGNOSIS — E78.5 HYPERLIPIDEMIA, UNSPECIFIED HYPERLIPIDEMIA TYPE: ICD-10-CM

## 2024-03-11 DIAGNOSIS — E11.65 TYPE 2 DIABETES MELLITUS WITH HYPERGLYCEMIA, WITH LONG-TERM CURRENT USE OF INSULIN (HCC): Primary | ICD-10-CM

## 2024-03-11 DIAGNOSIS — E11.22 TYPE 2 DIABETES MELLITUS WITH CHRONIC KIDNEY DISEASE, WITHOUT LONG-TERM CURRENT USE OF INSULIN, UNSPECIFIED CKD STAGE (HCC): ICD-10-CM

## 2024-03-11 DIAGNOSIS — I10 ESSENTIAL (PRIMARY) HYPERTENSION: ICD-10-CM

## 2024-03-11 LAB
HBA1C MFR BLD: 9.4 %
MICROALB/CREAT RATIO, POC: NORMAL MG/G
MICROALBUMIN URINE, POC: 30 MG/L

## 2024-03-11 PROCEDURE — 3078F DIAST BP <80 MM HG: CPT | Performed by: FAMILY MEDICINE

## 2024-03-11 PROCEDURE — 3075F SYST BP GE 130 - 139MM HG: CPT | Performed by: FAMILY MEDICINE

## 2024-03-11 PROCEDURE — 99214 OFFICE O/P EST MOD 30 MIN: CPT | Performed by: FAMILY MEDICINE

## 2024-03-11 PROCEDURE — 82044 UR ALBUMIN SEMIQUANTITATIVE: CPT | Performed by: FAMILY MEDICINE

## 2024-03-11 PROCEDURE — 83036 HEMOGLOBIN GLYCOSYLATED A1C: CPT | Performed by: FAMILY MEDICINE

## 2024-03-11 RX ORDER — ASPIRIN 81 MG/1
81 TABLET ORAL DAILY
Qty: 90 TABLET | Refills: 1 | Status: SHIPPED | OUTPATIENT
Start: 2024-03-11

## 2024-03-11 RX ORDER — INSULIN GLARGINE 100 [IU]/ML
20 INJECTION, SOLUTION SUBCUTANEOUS NIGHTLY
Qty: 5 ADJUSTABLE DOSE PRE-FILLED PEN SYRINGE | Refills: 3 | Status: SHIPPED | OUTPATIENT
Start: 2024-03-11

## 2024-03-11 RX ORDER — DULAGLUTIDE 0.75 MG/.5ML
0.75 INJECTION, SOLUTION SUBCUTANEOUS WEEKLY
Qty: 6 ML | Refills: 2 | Status: SHIPPED | OUTPATIENT
Start: 2024-03-11

## 2024-03-11 RX ORDER — ATORVASTATIN CALCIUM 80 MG/1
80 TABLET, FILM COATED ORAL DAILY
Qty: 90 TABLET | Refills: 1 | Status: SHIPPED | OUTPATIENT
Start: 2024-03-11

## 2024-03-11 ASSESSMENT — PATIENT HEALTH QUESTIONNAIRE - PHQ9
2. FEELING DOWN, DEPRESSED OR HOPELESS: 0
SUM OF ALL RESPONSES TO PHQ QUESTIONS 1-9: 0
1. LITTLE INTEREST OR PLEASURE IN DOING THINGS: 0
SUM OF ALL RESPONSES TO PHQ9 QUESTIONS 1 & 2: 0
SUM OF ALL RESPONSES TO PHQ QUESTIONS 1-9: 0

## 2024-03-11 NOTE — PROGRESS NOTES
HPI  Mandy Lora comes in for follow up care.  Uncontrolled DM2: Patient has uncontrolled type 2 diabetes mellitus.  HbA1c today is elevated at 9.4.  We have referred her in the past to see the endocrinologist but she is yet to do this.  She is not taking medication as prescribed.  She has not taken metformin and the Trulicity.  She is taking 15 units of the Lantus.  She is on glipizide.  After discussion she will take medication as prescribed.  I will send a refill of the metformin.  I will send a refill of the Trulicity.  Will go up on the insulin to 20 units daily.  She will schedule an appointment to see the endocrinologist.  The number to call and schedule an appointment was given to the patient.  She will keep a blood glucose log and I will follow-up at next visit.    HTN: Patient has hypertension.  Blood pressure is stable.  She denies headache, changes in vision or focal weakness.  She is on amlodipine, HCTZ and takes potassium supplementation.  Will continue with these medications.  She will take a low-sodium diet.  Dyslipidemia: Patient has dyslipidemia.  She is on atorvastatin.  Takes 80 mg daily.  She will exercise take a diet low in polysaturated fats.        Past Medical History  Past Medical History:   Diagnosis Date    Depression     Diabetes (HCC)     Hyperlipidemia     Hypertension     Stroke (HCC)     2012       Surgical History  History reviewed. No pertinent surgical history.     Medications  Current Outpatient Medications   Medication Sig Dispense Refill    amLODIPine (NORVASC) 10 MG tablet TAKE 1 TABLET BY MOUTH DAILY 90 tablet 1    Dulaglutide (TRULICITY) 0.75 MG/0.5ML SOPN Inject 0.75 mg into the skin once a week 6 mL 2    SITagliptin (JANUVIA) 100 MG tablet Take 1 tablet by mouth daily 90 tablet 1    glipiZIDE (GLUCOTROL) 5 MG tablet TAKE 2 TABLETS BY MOUTH TWICE DAILY 360 tablet 1    hydroCHLOROthiazide (HYDRODIURIL) 12.5 MG tablet TAKE 1 TABLET BY MOUTH DAILY 90 tablet 1    potassium

## 2024-03-11 NOTE — PROGRESS NOTES
\"Have you been to the ER, urgent care clinic since your last visit?  Hospitalized since your last visit?\"    NO    “Have you seen or consulted any other health care providers outside of [facility] since your last visit?”    NO    “Have you had a colorectal cancer screening such as a colonoscopy/FIT/Cologuard?    NO

## 2024-04-11 ENCOUNTER — OFFICE VISIT (OUTPATIENT)
Facility: CLINIC | Age: 59
End: 2024-04-11
Payer: MEDICARE

## 2024-04-11 VITALS
DIASTOLIC BLOOD PRESSURE: 80 MMHG | RESPIRATION RATE: 20 BRPM | BODY MASS INDEX: 25.33 KG/M2 | WEIGHT: 152 LBS | HEIGHT: 65 IN | SYSTOLIC BLOOD PRESSURE: 136 MMHG | OXYGEN SATURATION: 99 % | TEMPERATURE: 97.7 F | HEART RATE: 80 BPM

## 2024-04-11 DIAGNOSIS — I63.9 CEREBROVASCULAR ACCIDENT (CVA), UNSPECIFIED MECHANISM (HCC): ICD-10-CM

## 2024-04-11 DIAGNOSIS — E78.5 HYPERLIPIDEMIA, UNSPECIFIED HYPERLIPIDEMIA TYPE: ICD-10-CM

## 2024-04-11 DIAGNOSIS — E11.9 TYPE 2 DIABETES MELLITUS WITHOUT COMPLICATION, WITHOUT LONG-TERM CURRENT USE OF INSULIN (HCC): Primary | ICD-10-CM

## 2024-04-11 DIAGNOSIS — I10 ESSENTIAL (PRIMARY) HYPERTENSION: ICD-10-CM

## 2024-04-11 LAB — HBA1C MFR BLD: 8.1 %

## 2024-04-11 PROCEDURE — 83036 HEMOGLOBIN GLYCOSYLATED A1C: CPT | Performed by: FAMILY MEDICINE

## 2024-04-11 PROCEDURE — 99214 OFFICE O/P EST MOD 30 MIN: CPT | Performed by: FAMILY MEDICINE

## 2024-04-11 PROCEDURE — 3078F DIAST BP <80 MM HG: CPT | Performed by: FAMILY MEDICINE

## 2024-04-11 PROCEDURE — 3075F SYST BP GE 130 - 139MM HG: CPT | Performed by: FAMILY MEDICINE

## 2024-04-11 ASSESSMENT — PATIENT HEALTH QUESTIONNAIRE - PHQ9
SUM OF ALL RESPONSES TO PHQ QUESTIONS 1-9: 0
2. FEELING DOWN, DEPRESSED OR HOPELESS: NOT AT ALL
SUM OF ALL RESPONSES TO PHQ9 QUESTIONS 1 & 2: 0
SUM OF ALL RESPONSES TO PHQ QUESTIONS 1-9: 0
1. LITTLE INTEREST OR PLEASURE IN DOING THINGS: NOT AT ALL
SUM OF ALL RESPONSES TO PHQ QUESTIONS 1-9: 0
SUM OF ALL RESPONSES TO PHQ QUESTIONS 1-9: 0

## 2024-04-11 NOTE — PROGRESS NOTES
\"Have you been to the ER, urgent care clinic since your last visit?  Hospitalized since your last visit?\"    NO    “Have you seen or consulted any other health care providers outside of [facility] since your last visit?”    NO        “Have you had a colorectal cancer screening such as a colonoscopy/FIT/Cologuard?    NO    No colonoscopy on file  No cologuard on file  Date of last FIT: 7/25/2021   No flexible sigmoidoscopy on file         Click Here for Release of Records Request

## 2024-04-11 NOTE — PROGRESS NOTES
HPI  Mandy Lora comes in for follow up care.  DM2: Patient has type 2 diabetes mellitus.  She comes in for follow-up care.  Her HbA1c today is 8.1.  She is doing lifestyle and dietary modification.  She is on glargine, metformin, glipizide.  She has Trulicity prescribed.  Was unsure how to use the medication.  We discussed this.  She would like to try and use the Trulicity weekly.  She will continue to keep blood glucose log.  She will intensify lifestyle and dietary modification.  I will follow-up at next visit.   Hypertension: Patient has hypertension.  Blood pressure is stable.  Patient is on amlodipine, HCTZ and takes potassium supplementation.  Will continue with the current treatment plan.  Dyslipidemia: Patient has dyslipidemia.  She is on atorvastatin.  Takes 80 mg daily.  Tolerating medication.  She will exercise and take a diet low in polysaturated fats.  Will recheck lipid panel at next visit.  CVA: Patient has a history of CVA with aphasia.  We discussed secondary prevention.  She is on aspirin, statin medication and blood pressure medication.  Continue current treatment plan.  Health maintenance: Patient is scheduled to have a colonoscopy in August this year.      Past Medical History  Past Medical History:   Diagnosis Date    Depression     Diabetes (HCC)     Hyperlipidemia     Hypertension     Stroke (HCC)     2012       Surgical History  History reviewed. No pertinent surgical history.     Medications  Current Outpatient Medications   Medication Sig Dispense Refill    aspirin 81 MG EC tablet Take 1 tablet by mouth daily 90 tablet 1    insulin glargine (LANTUS SOLOSTAR) 100 UNIT/ML injection pen Inject 20 Units into the skin nightly 5 Adjustable Dose Pre-filled Pen Syringe 3    metFORMIN (GLUCOPHAGE) 1000 MG tablet Take 1 tablet by mouth 2 times daily (with meals) 180 tablet 1    atorvastatin (LIPITOR) 80 MG tablet Take 1 tablet by mouth daily 90 tablet 1    amLODIPine (NORVASC) 10 MG tablet TAKE 1

## 2024-04-20 LAB
ALBUMIN SERPL-MCNC: 4.5 G/DL (ref 3.8–4.9)
ALBUMIN/GLOB SERPL: 1.7 {RATIO} (ref 1.2–2.2)
ALP SERPL-CCNC: 137 IU/L (ref 44–121)
ALT SERPL-CCNC: 24 IU/L (ref 0–32)
AST SERPL-CCNC: 17 IU/L (ref 0–40)
BASOPHILS # BLD AUTO: 0.1 X10E3/UL (ref 0–0.2)
BASOPHILS NFR BLD AUTO: 0 %
BILIRUB SERPL-MCNC: 0.4 MG/DL (ref 0–1.2)
BUN SERPL-MCNC: 7 MG/DL (ref 6–24)
BUN/CREAT SERPL: 10 (ref 9–23)
CALCIUM SERPL-MCNC: 10 MG/DL (ref 8.7–10.2)
CHLORIDE SERPL-SCNC: 100 MMOL/L (ref 96–106)
CHOLEST SERPL-MCNC: 135 MG/DL (ref 100–199)
CO2 SERPL-SCNC: 25 MMOL/L (ref 20–29)
CREAT SERPL-MCNC: 0.72 MG/DL (ref 0.57–1)
EGFRCR SERPLBLD CKD-EPI 2021: 97 ML/MIN/1.73
EOSINOPHIL # BLD AUTO: 0.1 X10E3/UL (ref 0–0.4)
EOSINOPHIL NFR BLD AUTO: 1 %
ERYTHROCYTE [DISTWIDTH] IN BLOOD BY AUTOMATED COUNT: 12.4 % (ref 11.7–15.4)
GLOBULIN SER CALC-MCNC: 2.6 G/DL (ref 1.5–4.5)
GLUCOSE SERPL-MCNC: 190 MG/DL (ref 70–99)
HCT VFR BLD AUTO: 44.6 % (ref 34–46.6)
HDLC SERPL-MCNC: 51 MG/DL
HGB BLD-MCNC: 14.9 G/DL (ref 11.1–15.9)
IMM GRANULOCYTES # BLD AUTO: 0 X10E3/UL (ref 0–0.1)
IMM GRANULOCYTES NFR BLD AUTO: 0 %
LDLC SERPL CALC-MCNC: 66 MG/DL (ref 0–99)
LYMPHOCYTES # BLD AUTO: 3.8 X10E3/UL (ref 0.7–3.1)
LYMPHOCYTES NFR BLD AUTO: 34 %
MCH RBC QN AUTO: 29.1 PG (ref 26.6–33)
MCHC RBC AUTO-ENTMCNC: 33.4 G/DL (ref 31.5–35.7)
MCV RBC AUTO: 87 FL (ref 79–97)
MONOCYTES # BLD AUTO: 0.8 X10E3/UL (ref 0.1–0.9)
MONOCYTES NFR BLD AUTO: 7 %
NEUTROPHILS # BLD AUTO: 6.4 X10E3/UL (ref 1.4–7)
NEUTROPHILS NFR BLD AUTO: 58 %
PLATELET # BLD AUTO: 276 X10E3/UL (ref 150–450)
POTASSIUM SERPL-SCNC: 3.9 MMOL/L (ref 3.5–5.2)
PROT SERPL-MCNC: 7.1 G/DL (ref 6–8.5)
RBC # BLD AUTO: 5.12 X10E6/UL (ref 3.77–5.28)
SODIUM SERPL-SCNC: 140 MMOL/L (ref 134–144)
SPECIMEN STATUS REPORT: NORMAL
TRIGL SERPL-MCNC: 95 MG/DL (ref 0–149)
VLDLC SERPL CALC-MCNC: 18 MG/DL (ref 5–40)
WBC # BLD AUTO: 11.2 X10E3/UL (ref 3.4–10.8)

## 2024-04-22 DIAGNOSIS — D72.829 LEUKOCYTOSIS, UNSPECIFIED TYPE: Primary | ICD-10-CM

## 2024-07-11 ENCOUNTER — OFFICE VISIT (OUTPATIENT)
Facility: CLINIC | Age: 59
End: 2024-07-11
Payer: MEDICARE

## 2024-07-11 VITALS
OXYGEN SATURATION: 100 % | RESPIRATION RATE: 20 BRPM | SYSTOLIC BLOOD PRESSURE: 137 MMHG | HEART RATE: 72 BPM | TEMPERATURE: 98.1 F | BODY MASS INDEX: 25.78 KG/M2 | WEIGHT: 151 LBS | HEIGHT: 64 IN | DIASTOLIC BLOOD PRESSURE: 71 MMHG

## 2024-07-11 DIAGNOSIS — I10 ESSENTIAL (PRIMARY) HYPERTENSION: ICD-10-CM

## 2024-07-11 DIAGNOSIS — E11.9 TYPE 2 DIABETES MELLITUS WITHOUT COMPLICATION, WITHOUT LONG-TERM CURRENT USE OF INSULIN (HCC): Primary | ICD-10-CM

## 2024-07-11 DIAGNOSIS — E78.5 HYPERLIPIDEMIA, UNSPECIFIED HYPERLIPIDEMIA TYPE: ICD-10-CM

## 2024-07-11 LAB — HBA1C MFR BLD: 8.6 %

## 2024-07-11 PROCEDURE — 3078F DIAST BP <80 MM HG: CPT | Performed by: FAMILY MEDICINE

## 2024-07-11 PROCEDURE — 83036 HEMOGLOBIN GLYCOSYLATED A1C: CPT | Performed by: FAMILY MEDICINE

## 2024-07-11 PROCEDURE — 3075F SYST BP GE 130 - 139MM HG: CPT | Performed by: FAMILY MEDICINE

## 2024-07-11 PROCEDURE — 99214 OFFICE O/P EST MOD 30 MIN: CPT | Performed by: FAMILY MEDICINE

## 2024-07-11 RX ORDER — DULAGLUTIDE 1.5 MG/.5ML
1.5 INJECTION, SOLUTION SUBCUTANEOUS WEEKLY
Qty: 6 ML | Refills: 1 | Status: SHIPPED | OUTPATIENT
Start: 2024-07-11

## 2024-07-11 RX ORDER — HYDROCHLOROTHIAZIDE 12.5 MG/1
12.5 TABLET ORAL DAILY
Qty: 90 TABLET | Refills: 1 | Status: SHIPPED | OUTPATIENT
Start: 2024-07-11

## 2024-07-11 NOTE — PROGRESS NOTES
1. \"Have you been to the ER, urgent care clinic since your last visit?  Hospitalized since your last visit?\"No    2. \"Have you seen or consulted any other health care providers outside of the [facility] since your last visit?\" No    3. For patients aged 45-75: Has the patient had a colonoscopy / FIT/ Cologuard? No      If the patient is female:    4. For patients aged 40-74: Has the patient had a mammogram within the past 2 years? Yes      5. For patients aged 21-65: Has the patient had a pap smear? Yes

## 2024-07-11 NOTE — PROGRESS NOTES
HPI  Mandy Lora comes in for follow-up care.  Diabetes mellitus type 2: Patient has type 2 diabetes mellitus.  Her HbA1c is up to 8.6.  She is on insulin 20 units daily.  She is on metformin 1000 mg 2 times a day and glipizide 10 mg twice a day.  She has been taking Trulicity 0.75 mg weekly.  We discussed lifestyle and dietary modification.  I will increase Trulicity to 1.5 mg weekly.  She will keep a blood glucose log.  I will follow-up at next visit.  Hypertension: Patient has hypertension.  Blood pressure is stable.  She is on amlodipine 10 mg daily, HCTZ with potassium supplementation.  She would like a refill of hydrochlorothiazide.  Prescription will be sent in.  She will take a low-sodium diet.  Dyslipidemia: Patient has dyslipidemia.  She is on atorvastatin 80 mg daily.  We will continue current treatment plan.  She will exercise and take a diet low in polysaturated fats.  Health maintenance: Patient due to have a colonoscopy done this month.      Past Medical History  Past Medical History:   Diagnosis Date    Depression     Diabetes (HCC)     Hyperlipidemia     Hypertension     Stroke (HCC)     2012       Surgical History  History reviewed. No pertinent surgical history.     Medications  Current Outpatient Medications   Medication Sig Dispense Refill    hydroCHLOROthiazide 12.5 MG tablet Take 1 tablet by mouth daily 90 tablet 1    dulaglutide (TRULICITY) 1.5 MG/0.5ML SC injection Inject 0.5 mLs into the skin once a week 6 mL 1    aspirin 81 MG EC tablet Take 1 tablet by mouth daily 90 tablet 1    insulin glargine (LANTUS SOLOSTAR) 100 UNIT/ML injection pen Inject 20 Units into the skin nightly 5 Adjustable Dose Pre-filled Pen Syringe 3    atorvastatin (LIPITOR) 80 MG tablet Take 1 tablet by mouth daily 90 tablet 1    amLODIPine (NORVASC) 10 MG tablet TAKE 1 TABLET BY MOUTH DAILY 90 tablet 1    glipiZIDE (GLUCOTROL) 5 MG tablet TAKE 2 TABLETS BY MOUTH TWICE DAILY 360 tablet 1    blood glucose test strips

## 2024-07-31 ENCOUNTER — TELEPHONE (OUTPATIENT)
Age: 59
End: 2024-07-31

## 2024-07-31 NOTE — TELEPHONE ENCOUNTER
Left message for patient to call back to confirm colonoscopy for 8/21/24. Patient still needs to do blood work and EKG.

## 2024-08-13 ENCOUNTER — TELEPHONE (OUTPATIENT)
Age: 59
End: 2024-08-13

## 2024-08-13 NOTE — TELEPHONE ENCOUNTER
Pt's son, Owen Lora, called back and updated the pt's phone number. Information is in Epic.     S/W pt to discuss needed testing before colonoscopy. Pt did not feel she could get the labs and EKG done before Friday. Pt asked to be rescheduled. Pt is now scheduled for 01/08/25.    Prep instructions along with lab and EKG orders mailed to address on file. I did confirm the address is correct.

## 2024-08-13 NOTE — TELEPHONE ENCOUNTER
Cannot reach pt at number listed in Epic. Called Owen Lora, son, to see if he had a different number. Mr. Lora states the number in epic is correct. He said that he will call his mom and ask her to call me. Pt still needs to have labs and EKG done before Friday.

## 2024-10-14 ENCOUNTER — OFFICE VISIT (OUTPATIENT)
Facility: CLINIC | Age: 59
End: 2024-10-14
Payer: MEDICARE

## 2024-10-14 ENCOUNTER — COMMUNITY OUTREACH (OUTPATIENT)
Facility: CLINIC | Age: 59
End: 2024-10-14

## 2024-10-14 VITALS
HEART RATE: 73 BPM | WEIGHT: 148.6 LBS | BODY MASS INDEX: 24.76 KG/M2 | OXYGEN SATURATION: 99 % | DIASTOLIC BLOOD PRESSURE: 70 MMHG | RESPIRATION RATE: 20 BRPM | SYSTOLIC BLOOD PRESSURE: 136 MMHG | TEMPERATURE: 98.2 F | HEIGHT: 65 IN

## 2024-10-14 DIAGNOSIS — Z23 ENCOUNTER FOR IMMUNIZATION: ICD-10-CM

## 2024-10-14 DIAGNOSIS — E78.5 HYPERLIPIDEMIA, UNSPECIFIED HYPERLIPIDEMIA TYPE: ICD-10-CM

## 2024-10-14 DIAGNOSIS — M54.50 CHRONIC MIDLINE LOW BACK PAIN, UNSPECIFIED WHETHER SCIATICA PRESENT: ICD-10-CM

## 2024-10-14 DIAGNOSIS — I10 ESSENTIAL (PRIMARY) HYPERTENSION: ICD-10-CM

## 2024-10-14 DIAGNOSIS — E11.9 TYPE 2 DIABETES MELLITUS WITHOUT COMPLICATION, WITHOUT LONG-TERM CURRENT USE OF INSULIN (HCC): Primary | ICD-10-CM

## 2024-10-14 DIAGNOSIS — G89.29 CHRONIC MIDLINE LOW BACK PAIN, UNSPECIFIED WHETHER SCIATICA PRESENT: ICD-10-CM

## 2024-10-14 LAB — HBA1C MFR BLD: 7.7 %

## 2024-10-14 PROCEDURE — G0008 ADMIN INFLUENZA VIRUS VAC: HCPCS | Performed by: FAMILY MEDICINE

## 2024-10-14 PROCEDURE — 3078F DIAST BP <80 MM HG: CPT | Performed by: FAMILY MEDICINE

## 2024-10-14 PROCEDURE — 3075F SYST BP GE 130 - 139MM HG: CPT | Performed by: FAMILY MEDICINE

## 2024-10-14 PROCEDURE — 99214 OFFICE O/P EST MOD 30 MIN: CPT | Performed by: FAMILY MEDICINE

## 2024-10-14 PROCEDURE — 83036 HEMOGLOBIN GLYCOSYLATED A1C: CPT | Performed by: FAMILY MEDICINE

## 2024-10-14 PROCEDURE — 90661 CCIIV3 VAC ABX FR 0.5 ML IM: CPT | Performed by: FAMILY MEDICINE

## 2024-10-14 RX ORDER — CYCLOBENZAPRINE HCL 10 MG
10 TABLET ORAL 3 TIMES DAILY PRN
Qty: 60 TABLET | Refills: 0 | Status: SHIPPED | OUTPATIENT
Start: 2024-10-14

## 2024-10-14 SDOH — ECONOMIC STABILITY: FOOD INSECURITY: WITHIN THE PAST 12 MONTHS, YOU WORRIED THAT YOUR FOOD WOULD RUN OUT BEFORE YOU GOT MONEY TO BUY MORE.: NEVER TRUE

## 2024-10-14 SDOH — ECONOMIC STABILITY: FOOD INSECURITY: WITHIN THE PAST 12 MONTHS, THE FOOD YOU BOUGHT JUST DIDN'T LAST AND YOU DIDN'T HAVE MONEY TO GET MORE.: NEVER TRUE

## 2024-10-14 SDOH — ECONOMIC STABILITY: INCOME INSECURITY: HOW HARD IS IT FOR YOU TO PAY FOR THE VERY BASICS LIKE FOOD, HOUSING, MEDICAL CARE, AND HEATING?: NOT HARD AT ALL

## 2024-10-14 ASSESSMENT — PATIENT HEALTH QUESTIONNAIRE - PHQ9
SUM OF ALL RESPONSES TO PHQ QUESTIONS 1-9: 0
2. FEELING DOWN, DEPRESSED OR HOPELESS: NOT AT ALL
1. LITTLE INTEREST OR PLEASURE IN DOING THINGS: NOT AT ALL
SUM OF ALL RESPONSES TO PHQ QUESTIONS 1-9: 0
SUM OF ALL RESPONSES TO PHQ9 QUESTIONS 1 & 2: 0

## 2024-10-14 ASSESSMENT — ANXIETY QUESTIONNAIRES
4. TROUBLE RELAXING: NOT AT ALL
6. BECOMING EASILY ANNOYED OR IRRITABLE: NOT AT ALL
3. WORRYING TOO MUCH ABOUT DIFFERENT THINGS: NOT AT ALL
GAD7 TOTAL SCORE: 0
7. FEELING AFRAID AS IF SOMETHING AWFUL MIGHT HAPPEN: NOT AT ALL
IF YOU CHECKED OFF ANY PROBLEMS ON THIS QUESTIONNAIRE, HOW DIFFICULT HAVE THESE PROBLEMS MADE IT FOR YOU TO DO YOUR WORK, TAKE CARE OF THINGS AT HOME, OR GET ALONG WITH OTHER PEOPLE: NOT DIFFICULT AT ALL
2. NOT BEING ABLE TO STOP OR CONTROL WORRYING: NOT AT ALL
1. FEELING NERVOUS, ANXIOUS, OR ON EDGE: NOT AT ALL
5. BEING SO RESTLESS THAT IT IS HARD TO SIT STILL: NOT AT ALL

## 2024-10-14 NOTE — PROGRESS NOTES
\"Have you been to the ER, urgent care clinic since your last visit?  Hospitalized since your last visit?\"    NO    “Have you seen or consulted any other health care providers outside our system since your last visit?”    NO      “Have you had a colorectal cancer screening such as a colonoscopy/FIT/Cologuard?    YES - Type: Colonoscopy - Where: [facility] Nurse/CMA to request most recent records if not in the chart     No colonoscopy on file  No cologuard on file  Date of last FIT: 7/25/2021   No flexible sigmoidoscopy on file     “Have you had a diabetic eye exam?”    NO     Date of last diabetic eye exam: 10/28/2020

## 2024-10-14 NOTE — PROGRESS NOTES
HPI  Mandy Lora comes in for follow up care.  DM2: Patient has type 2 diabetes mellitus.  She is doing lifestyle and dietary modification.  She is on Trulicity 1.5 mg weekly, insulin Lantus 20 units daily, metformin at 1000 mg 2 times a day, glipizide 10 mg twice a day.  HbA1c is down to 7.7.  She will intensify lifestyle and dietary modification.    Back pain: Patient has low back pain.  Pain noted with weightbearing and activity.  Denies foot drop or bladder and bowel dysfunction.  She takes Tylenol for pain as needed.  Would like a muscle relaxant.  I will send in Flexeril.  She will continue with supportive care.  HTN: Patient has hypertension.  Blood pressure is stable.  Denies headache, changes in vision or focal weakness.  She is on HCTZ 12.5 mg daily, amlodipine 10 mg daily.  Will continue with the medications.  She will take a low-sodium diet.  I will recheck labs at next visit.  Dyslipidemia: Patient has dyslipidemia.  She is on atorvastatin 80 mg daily.  Stable on this medication.  Will recheck lipid panel at next visit.  She will continue to exercise and take a diet low in polysaturated fats.  Health maintenance: Patient will get the flu vaccine today.      Past Medical History  Past Medical History:   Diagnosis Date    Depression     Diabetes (HCC)     Hyperlipidemia     Hypertension     Stroke (HCC)     2012       Surgical History  History reviewed. No pertinent surgical history.     Medications  Current Outpatient Medications   Medication Sig Dispense Refill    hydroCHLOROthiazide 12.5 MG tablet Take 1 tablet by mouth daily 90 tablet 1    dulaglutide (TRULICITY) 1.5 MG/0.5ML SC injection Inject 0.5 mLs into the skin once a week 6 mL 1    aspirin 81 MG EC tablet Take 1 tablet by mouth daily 90 tablet 1    insulin glargine (LANTUS SOLOSTAR) 100 UNIT/ML injection pen Inject 20 Units into the skin nightly 5 Adjustable Dose Pre-filled Pen Syringe 3    metFORMIN (GLUCOPHAGE) 1000 MG tablet Take 1 tablet

## 2024-10-23 DIAGNOSIS — E11.22 TYPE 2 DIABETES MELLITUS WITH CHRONIC KIDNEY DISEASE, WITHOUT LONG-TERM CURRENT USE OF INSULIN, UNSPECIFIED CKD STAGE (HCC): ICD-10-CM

## 2024-10-23 DIAGNOSIS — E87.6 HYPOKALEMIA: ICD-10-CM

## 2024-10-23 DIAGNOSIS — E11.9 TYPE 2 DIABETES MELLITUS WITHOUT COMPLICATION, WITHOUT LONG-TERM CURRENT USE OF INSULIN (HCC): ICD-10-CM

## 2024-10-24 RX ORDER — LANCETS 33 GAUGE
EACH MISCELLANEOUS
Qty: 300 EACH | Refills: 3 | Status: SHIPPED | OUTPATIENT
Start: 2024-10-24

## 2024-10-24 RX ORDER — FLURBIPROFEN SODIUM 0.3 MG/ML
SOLUTION/ DROPS OPHTHALMIC
Qty: 100 EACH | Refills: 3 | Status: SHIPPED | OUTPATIENT
Start: 2024-10-24

## 2024-10-24 RX ORDER — HYDROCHLOROTHIAZIDE 12.5 MG/1
12.5 TABLET ORAL DAILY
Qty: 90 TABLET | Refills: 1 | Status: SHIPPED | OUTPATIENT
Start: 2024-10-24

## 2024-10-24 RX ORDER — ASPIRIN 81 MG/1
81 TABLET, COATED ORAL DAILY
Qty: 90 TABLET | Refills: 1 | Status: SHIPPED | OUTPATIENT
Start: 2024-10-24

## 2024-10-24 RX ORDER — INSULIN GLARGINE 100 [IU]/ML
INJECTION, SOLUTION SUBCUTANEOUS
Qty: 30 ML | Refills: 1 | Status: SHIPPED | OUTPATIENT
Start: 2024-10-24

## 2024-10-24 RX ORDER — POTASSIUM CHLORIDE 750 MG/1
10 TABLET, EXTENDED RELEASE ORAL DAILY
Qty: 90 TABLET | Refills: 1 | Status: SHIPPED | OUTPATIENT
Start: 2024-10-24

## 2024-10-24 RX ORDER — BLOOD SUGAR DIAGNOSTIC
STRIP MISCELLANEOUS
Qty: 300 STRIP | Refills: 3 | Status: SHIPPED | OUTPATIENT
Start: 2024-10-24

## 2024-10-24 NOTE — TELEPHONE ENCOUNTER
Last seen 10/14/2024   Last labs 4/19/2024  Last filled  7/11/2024 hydroCHLOROthiazide                    4/2/2023 potassium chloride                    3/1/2023 Lancets                    3/11/2024 insulin glargine                    3/11/2024 aspirin 81                    3/1/2023 blood glucose test strips                    2/20/2023 Insulin Pen Needle     Next appointment 11/14/2024     Lab Results   Component Value Date     04/19/2024    K 3.9 04/19/2024     04/19/2024    CO2 25 04/19/2024    BUN 7 04/19/2024    CREATININE 0.72 04/19/2024    GLUCOSE 190 (H) 04/19/2024    CALCIUM 10.0 04/19/2024    BILITOT 0.4 04/19/2024    ALKPHOS 137 (H) 04/19/2024    AST 17 04/19/2024    ALT 24 04/19/2024    LABGLOM 97 04/19/2024    GFRAA >60 10/06/2021    AGRATIO 1.7 04/19/2024    GLOB 2.9 06/22/2023

## 2024-11-14 ENCOUNTER — OFFICE VISIT (OUTPATIENT)
Facility: CLINIC | Age: 59
End: 2024-11-14

## 2024-11-14 VITALS
TEMPERATURE: 97.4 F | DIASTOLIC BLOOD PRESSURE: 81 MMHG | BODY MASS INDEX: 22.99 KG/M2 | OXYGEN SATURATION: 99 % | WEIGHT: 138 LBS | HEART RATE: 91 BPM | RESPIRATION RATE: 20 BRPM | SYSTOLIC BLOOD PRESSURE: 135 MMHG | HEIGHT: 65 IN

## 2024-11-14 DIAGNOSIS — Z00.00 MEDICARE ANNUAL WELLNESS VISIT, SUBSEQUENT: Primary | ICD-10-CM

## 2024-11-14 DIAGNOSIS — E78.5 HYPERLIPIDEMIA, UNSPECIFIED HYPERLIPIDEMIA TYPE: ICD-10-CM

## 2024-11-14 DIAGNOSIS — E11.9 COMPREHENSIVE DIABETIC FOOT EXAMINATION, TYPE 2 DM, ENCOUNTER FOR (HCC): ICD-10-CM

## 2024-11-14 DIAGNOSIS — G89.29 CHRONIC MIDLINE LOW BACK PAIN, UNSPECIFIED WHETHER SCIATICA PRESENT: ICD-10-CM

## 2024-11-14 DIAGNOSIS — K21.9 GASTRO-ESOPHAGEAL REFLUX DISEASE WITHOUT ESOPHAGITIS: ICD-10-CM

## 2024-11-14 DIAGNOSIS — F17.200 TOBACCO USE DISORDER: ICD-10-CM

## 2024-11-14 DIAGNOSIS — I10 ESSENTIAL (PRIMARY) HYPERTENSION: ICD-10-CM

## 2024-11-14 DIAGNOSIS — M54.50 CHRONIC MIDLINE LOW BACK PAIN, UNSPECIFIED WHETHER SCIATICA PRESENT: ICD-10-CM

## 2024-11-14 DIAGNOSIS — E11.22 TYPE 2 DIABETES MELLITUS WITH CHRONIC KIDNEY DISEASE, WITHOUT LONG-TERM CURRENT USE OF INSULIN, UNSPECIFIED CKD STAGE (HCC): ICD-10-CM

## 2024-11-14 DIAGNOSIS — Z87.891 PERSONAL HISTORY OF TOBACCO USE: ICD-10-CM

## 2024-11-14 RX ORDER — DICYCLOMINE HCL 20 MG
20 TABLET ORAL 4 TIMES DAILY PRN
COMMUNITY
Start: 2024-11-10

## 2024-11-14 RX ORDER — ONDANSETRON 4 MG/1
TABLET, ORALLY DISINTEGRATING ORAL
COMMUNITY
Start: 2024-11-10

## 2024-11-14 RX ORDER — NICOTINE POLACRILEX 4 MG/1
20 GUM, CHEWING ORAL DAILY
COMMUNITY
Start: 2024-11-10

## 2024-11-14 ASSESSMENT — LIFESTYLE VARIABLES
HOW OFTEN DURING THE LAST YEAR HAVE YOU FOUND THAT YOU WERE NOT ABLE TO STOP DRINKING ONCE YOU HAD STARTED: NEVER
HOW OFTEN DURING THE LAST YEAR HAVE YOU FAILED TO DO WHAT WAS NORMALLY EXPECTED FROM YOU BECAUSE OF DRINKING: NEVER
HOW OFTEN DURING THE LAST YEAR HAVE YOU NEEDED AN ALCOHOLIC DRINK FIRST THING IN THE MORNING TO GET YOURSELF GOING AFTER A NIGHT OF HEAVY DRINKING: NEVER
HOW MANY STANDARD DRINKS CONTAINING ALCOHOL DO YOU HAVE ON A TYPICAL DAY: 5 OR 6
HOW OFTEN DURING THE LAST YEAR HAVE YOU BEEN UNABLE TO REMEMBER WHAT HAPPENED THE NIGHT BEFORE BECAUSE YOU HAD BEEN DRINKING: NEVER
HOW OFTEN DURING THE LAST YEAR HAVE YOU HAD A FEELING OF GUILT OR REMORSE AFTER DRINKING: NEVER
HAS A RELATIVE, FRIEND, DOCTOR, OR ANOTHER HEALTH PROFESSIONAL EXPRESSED CONCERN ABOUT YOUR DRINKING OR SUGGESTED YOU CUT DOWN: NO
HAVE YOU OR SOMEONE ELSE BEEN INJURED AS A RESULT OF YOUR DRINKING: NO
HOW OFTEN DO YOU HAVE A DRINK CONTAINING ALCOHOL: 2-4 TIMES A MONTH

## 2024-11-14 ASSESSMENT — PATIENT HEALTH QUESTIONNAIRE - PHQ9
SUM OF ALL RESPONSES TO PHQ QUESTIONS 1-9: 0
SUM OF ALL RESPONSES TO PHQ9 QUESTIONS 1 & 2: 0
SUM OF ALL RESPONSES TO PHQ QUESTIONS 1-9: 0
1. LITTLE INTEREST OR PLEASURE IN DOING THINGS: NOT AT ALL
2. FEELING DOWN, DEPRESSED OR HOPELESS: NOT AT ALL

## 2024-11-15 NOTE — PATIENT INSTRUCTIONS
9 Ways to Cut Back on Drinking  Maybe you've found yourself drinking more alcohol than you'd prefer. If you want to cut back, here are some ideas to try.    Think before you drink.  Do you really want a drink, or is it just a habit? If you're used to having a drink at a certain time, try doing something else then.     Look for substitutes.  Find some no-alcohol drinks that you enjoy, like flavored seltzer water, tea with honey, or tonic with a slice of lime. Or try alcohol-free beer or \"virgin\" cocktails (without the alcohol).     Drink more water.  Use water to quench your thirst. Drink a glass of water before you have any alcohol. Have another glass along with every drink or between drinks.     Shrink your drink.  For example, have a bottle of beer instead of a pint. Use a smaller glass for wine. Choose drinks with lower alcohol content (ABV%). Or use less liquor and more mixer in cocktails.     Slow down.  It's easy to drink quickly and without thinking about it. Pay attention, and make each drink last longer.     Do the math.  Total up how much you spend on alcohol each month. How much is that a year? If you cut back, what could you do with the money you save?     Take a break.  Choose a day or two each week when you won't drink at all. Notice how you feel on those days, physically and emotionally. How did you sleep? Do you feel better? Over time, add more break days.     Count calories.  Would you like to lose some weight? For some people that's a good motivator for cutting back. Figure out how many calories are in each drink. How many does that add up to in a day? In a week? In a month?     Practice saying no.  Be ready when someone offers you a drink. Try: \"Thanks, I've had enough.\" Or \"Thanks, but I'm cutting back.\" Or \"No, thanks. I feel better when I drink less.\"   Current as of: November 15, 2023  Content Version: 14.2  © 2024 [employer].   Care instructions adapted under license by [facility]

## 2024-11-15 NOTE — PROGRESS NOTES
Medicare Annual Wellness Visit    Mandy Lora is here for Medicare AWV and Diabetes    Assessment & Plan    Diagnosis Orders   1. Medicare annual wellness visit, subsequent        2. Comprehensive diabetic foot examination, type 2 DM, encounter for ([facility])   DIABETES FOOT EXAM      3. Type 2 diabetes mellitus with chronic kidney disease, without long-term current use of insulin, unspecified CKD stage ([facility])   DIABETES FOOT EXAM    External Referral To Ophthalmology      4. Essential (primary) hypertension        5. Hyperlipidemia, unspecified hyperlipidemia type        6. Tobacco use disorder        7. Gastro-esophageal reflux disease without esophagitis        8. Chronic midline low back pain, unspecified whether sciatica present        9. Personal history of tobacco use  CT Lung Screen (Initial/Annual/Baseline)        Recommendations for Preventive Services Due: see orders and patient instructions/AVS.  Recommended screening schedule for the next 5-10 years is provided to the patient in written form: see Patient Instructions/AVS.     Return in about 3 months (around 2/14/2025), or if symptoms worsen or fail to improve, for Hypertension, Diabetes Mellitus.     Subjective   Mandy Lora comes in for Medicare wellness exam.      Patient's complete Health Risk Assessment and screening values have been reviewed and are found in Flowsheets. The following problems were reviewed today and where indicated follow up appointments were made and/or referrals ordered.    Positive Risk Factor Screenings with Interventions:       Alcohol Screening:  AUDIT-C Score: 6  AUDIT Total Score: 6  Total Score Interpretation: 0-7 suggests low risk alcohol consumption but assess individual risks   Interventions:  Patient advised to follow up in the office for further evalution and treatment               Dentist Screen:  Have you seen the dentist within the past year?: (!) No    Intervention:  Advised to schedule with their dentist   
\"Have you been to the ER, urgent care clinic since your last visit?  Hospitalized since your last visit?\"    NO    “Have you seen or consulted any other health care providers outside our system since your last visit?”    NO      “Have you had a colorectal cancer screening such as a colonoscopy/FIT/Cologuard?    NO    No colonoscopy on file  No cologuard on file  Date of last FIT: 7/25/2021   No flexible sigmoidoscopy on file     “Have you had a diabetic eye exam?”    NO     Date of last diabetic eye exam: 10/28/2020            
discussed, annual eye examinations at Ophthalmology discussed, glycohemoglobin and other lab monitoring discussed, and long term diabetic complications discussed  radiology results and schedule of future radiology studies reviewed with patient      I have discussed the diagnosis with the patient and the intended plan of care as seen in the above orders. The patient has received an after-visit summary and questions were answered concerning future plans. I have discussed medication, side effects, and warnings with the patient in detail. The patient verbalized understanding and is in agreement with the plan of care. The patient will contact the office with any additional concerns.    Maxime Killian MD    PLEASE NOTE:   This document has been produced using voice recognition software. Unrecognized errors in transcription may be present

## 2025-02-03 ENCOUNTER — CARE COORDINATION (OUTPATIENT)
Facility: CLINIC | Age: 60
End: 2025-02-03

## 2025-02-03 DIAGNOSIS — E11.22 TYPE 2 DIABETES MELLITUS WITH DIABETIC CHRONIC KIDNEY DISEASE, UNSPECIFIED CKD STAGE, UNSPECIFIED WHETHER LONG TERM INSULIN USE (HCC): ICD-10-CM

## 2025-02-03 DIAGNOSIS — I10 ESSENTIAL HYPERTENSION: Primary | ICD-10-CM

## 2025-02-03 DIAGNOSIS — E78.5 HYPERLIPIDEMIA, UNSPECIFIED HYPERLIPIDEMIA TYPE: ICD-10-CM

## 2025-02-03 DIAGNOSIS — E11.21 TYPE 2 DIABETES WITH NEPHROPATHY (HCC): ICD-10-CM

## 2025-02-03 DIAGNOSIS — E11.9 TYPE 2 DIABETES MELLITUS WITHOUT COMPLICATION, UNSPECIFIED WHETHER LONG TERM INSULIN USE (HCC): ICD-10-CM

## 2025-02-03 DIAGNOSIS — E87.8 ELECTROLYTE ABNORMALITY: ICD-10-CM

## 2025-02-03 PROCEDURE — 1111F DSCHRG MED/CURRENT MED MERGE: CPT | Performed by: FAMILY MEDICINE

## 2025-02-03 RX ORDER — ACETAMINOPHEN 500 MG
500 TABLET ORAL EVERY 6 HOURS PRN
COMMUNITY

## 2025-02-03 RX ORDER — IBUPROFEN 200 MG
TABLET ORAL EVERY 6 HOURS PRN
COMMUNITY

## 2025-02-03 NOTE — CARE COORDINATION
Care Transitions Note    Initial Call - Call within 2 business days of discharge: Yes    Patient Current Location:  [place]    Care Transition Nurse contacted the patient by telephone to perform post hospital discharge assessment, verified name and  as identifiers. Provided introduction to self, and explanation of the Care Transition Nurse role.     Patient: Mandy Lora    Patient : 1965   MRN: [id number]    Reason for Admission: Abdominal Pain with Emesis 2/2 Constipation, and Hypokalemia/Hypomagnesemia  Admission Date: 25  Discharge Date:  25  RURS: No data recorded        Was this an external facility discharge? Yes. Discharge Date: 25. Facility Name: [facility]    Additional needs identified to be addressed with provider   No needs identified             Method of communication with provider: none.    Patients top risk factors for readmission: medical condition-constipation and electrolyte abnormalities, uterine fibroids    Interventions to address risk factors:   Review of patient management of conditions/medications: Reviewed that CT abdomen/pelvix and pelvic US showed uterine fibroids; recommendation is to follow up with ob-gyn in 6 months. Offered earlier appt with PCP and patient declined.    Care Summary Note: Patient reports she's feeling much better. She had not moved her bowels in about 1 week PTA and states she had a UTI, although discharge summary notes that acute cystitis was ruled out. Reports her abdominal pain resolved immediately after having a BM during her admission. She has moved her bowels since her discharge and denies any straining. Eating and drinking well. Denies any burning with urination, cloudy or malodorous urine, or fever/chills/sweating.    Care Transition Nurse reviewed discharge instructions, medical action plan, and red flags with patient. The patient was given an opportunity to ask questions; all questions answered at this time.. The patient

## 2025-02-05 DIAGNOSIS — E11.65 TYPE 2 DIABETES MELLITUS WITH HYPERGLYCEMIA, WITH LONG-TERM CURRENT USE OF INSULIN (HCC): ICD-10-CM

## 2025-02-05 DIAGNOSIS — Z79.4 TYPE 2 DIABETES MELLITUS WITH HYPERGLYCEMIA, WITH LONG-TERM CURRENT USE OF INSULIN (HCC): ICD-10-CM

## 2025-02-05 DIAGNOSIS — E78.5 HYPERLIPIDEMIA, UNSPECIFIED HYPERLIPIDEMIA TYPE: ICD-10-CM

## 2025-02-05 RX ORDER — ATORVASTATIN CALCIUM 80 MG/1
80 TABLET, FILM COATED ORAL DAILY
Qty: 90 TABLET | Refills: 1 | Status: SHIPPED | OUTPATIENT
Start: 2025-02-05

## 2025-02-05 RX ORDER — GLIPIZIDE 5 MG/1
10 TABLET ORAL 2 TIMES DAILY
Qty: 360 TABLET | Refills: 1 | Status: SHIPPED | OUTPATIENT
Start: 2025-02-05

## 2025-02-05 NOTE — TELEPHONE ENCOUNTER
Last seen 11/14/2024   Last labs 4/19/2024  Last filled  3/11/2024 metFORMIN                    3/11/2024 atorvastatin   Next appointment 2/18/2025     Lab Results   Component Value Date     04/19/2024    K 3.9 04/19/2024     04/19/2024    CO2 25 04/19/2024    BUN 7 04/19/2024    CREATININE 0.72 04/19/2024    GLUCOSE 190 (H) 04/19/2024    CALCIUM 10.0 04/19/2024    BILITOT 0.4 04/19/2024    ALKPHOS 137 (H) 04/19/2024    AST 17 04/19/2024    ALT 24 04/19/2024    LABGLOM 97 04/19/2024    GFRAA >60 10/06/2021    AGRATIO 1.7 04/19/2024    GLOB 2.9 06/22/2023

## 2025-02-10 ENCOUNTER — CARE COORDINATION (OUTPATIENT)
Facility: CLINIC | Age: 60
End: 2025-02-10

## 2025-02-10 DIAGNOSIS — K25.7 CHRONIC GASTRIC ULCER, UNSPECIFIED WHETHER GASTRIC ULCER HEMORRHAGE OR PERFORATION PRESENT: ICD-10-CM

## 2025-02-10 DIAGNOSIS — K59.09 CHRONIC CONSTIPATION: Primary | ICD-10-CM

## 2025-02-10 NOTE — CARE COORDINATION
management-confirm  of Prilosec and Zofran if needed  referrals-follow up on GI referral request      Larissa Coleman RN

## 2025-02-13 ENCOUNTER — CARE COORDINATION (OUTPATIENT)
Facility: CLINIC | Age: 60
End: 2025-02-13

## 2025-02-13 NOTE — CARE COORDINATION
Care Transitions Note    Follow Up Call     Patient Current Location:  [place]    Care Transition Nurse contacted the patient by telephone. Verified name and  as identifiers.    Additional needs identified to be addressed with provider   No needs identified                 Method of communication with provider: none.    Care Summary Note: Patient reports doing a little better. She had a very small BM yesterday, but otherwise has not had a BM since the week before last. She has not been able to get an enema or a suppository due to having nobody who can pick this up for her . Denies abdominal pain or pressure, or abdominal bloating. Reports N/V, last episode of emesis was last night. She's been eating and drinking normally today, noting she had not been eating prior to today. She has Dulcolax capsules at home and agrees to take that now.     Plan of care updates since last contact:  Education: Advised patient to go to the ED asap due to concern that she may get impacted. Reviewed the dangers of fecal impaction and the urgency of being treated to prevent complications. The patient declines to go to the ED now. CTN reiterated the importance of timely treatment of constipation and strongly urged patient to call 911 if she develops abdominal pain or has another episode of vomiting.  Review of patient management of conditions/medications: Reviewed Dulcolax administration instructions which state to take 1-3 capsules once daily. Encouraged patient to take 2-3 capsules now to start the process of getting her bowels moving. She has not been able to obtain Zofran or Protonix, and despite confirming last week that she had Miralax, she now denies having Miralax at home.   Referrals: CTN confirmed PCP placed [facility]. CTN contacted [facility] and confirmed receipt of referral, but it's pending being processed. Patient will likely receive a call by the beginning of next week to schedule an appt. CTN

## 2025-02-18 ENCOUNTER — OFFICE VISIT (OUTPATIENT)
Facility: CLINIC | Age: 60
End: 2025-02-18
Payer: MEDICARE

## 2025-02-18 VITALS
RESPIRATION RATE: 20 BRPM | HEART RATE: 91 BPM | OXYGEN SATURATION: 100 % | SYSTOLIC BLOOD PRESSURE: 138 MMHG | HEIGHT: 64 IN | WEIGHT: 128 LBS | BODY MASS INDEX: 21.85 KG/M2 | TEMPERATURE: 98 F | DIASTOLIC BLOOD PRESSURE: 76 MMHG

## 2025-02-18 DIAGNOSIS — E11.22 TYPE 2 DIABETES MELLITUS WITH CHRONIC KIDNEY DISEASE, WITHOUT LONG-TERM CURRENT USE OF INSULIN, UNSPECIFIED CKD STAGE (HCC): ICD-10-CM

## 2025-02-18 DIAGNOSIS — K21.9 GASTRO-ESOPHAGEAL REFLUX DISEASE WITHOUT ESOPHAGITIS: ICD-10-CM

## 2025-02-18 DIAGNOSIS — E78.5 HYPERLIPIDEMIA, UNSPECIFIED HYPERLIPIDEMIA TYPE: ICD-10-CM

## 2025-02-18 DIAGNOSIS — R10.10 PAIN OF UPPER ABDOMEN: ICD-10-CM

## 2025-02-18 DIAGNOSIS — Z79.4 TYPE 2 DIABETES MELLITUS WITH HYPERGLYCEMIA, WITH LONG-TERM CURRENT USE OF INSULIN (HCC): ICD-10-CM

## 2025-02-18 DIAGNOSIS — Z09 HOSPITAL DISCHARGE FOLLOW-UP: ICD-10-CM

## 2025-02-18 DIAGNOSIS — I10 ESSENTIAL (PRIMARY) HYPERTENSION: ICD-10-CM

## 2025-02-18 DIAGNOSIS — D35.00 ADRENAL ADENOMA, UNSPECIFIED LATERALITY: ICD-10-CM

## 2025-02-18 DIAGNOSIS — D50.9 IRON DEFICIENCY ANEMIA, UNSPECIFIED IRON DEFICIENCY ANEMIA TYPE: ICD-10-CM

## 2025-02-18 DIAGNOSIS — K27.9 PEPTIC ULCER: Primary | ICD-10-CM

## 2025-02-18 DIAGNOSIS — E11.65 TYPE 2 DIABETES MELLITUS WITH HYPERGLYCEMIA, WITH LONG-TERM CURRENT USE OF INSULIN (HCC): ICD-10-CM

## 2025-02-18 PROBLEM — H40.009 OPEN ANGLE GLAUCOMA SUSPECT: Status: ACTIVE | Noted: 2021-05-20

## 2025-02-18 PROBLEM — N39.0 UTI (URINARY TRACT INFECTION): Status: ACTIVE | Noted: 2025-01-29

## 2025-02-18 PROBLEM — K31.89 GASTRIC MASS: Status: ACTIVE | Noted: 2025-02-14

## 2025-02-18 PROBLEM — E87.6 HYPOKALEMIA: Status: ACTIVE | Noted: 2025-01-29

## 2025-02-18 PROBLEM — R93.5 ABNORMAL CT OF THE ABDOMEN: Status: ACTIVE | Noted: 2025-02-14

## 2025-02-18 PROBLEM — R10.84 ABDOMINAL PAIN, GENERALIZED: Status: ACTIVE | Noted: 2025-01-29

## 2025-02-18 PROBLEM — D12.3 BENIGN NEOPLASM OF TRANSVERSE COLON: Status: ACTIVE | Noted: 2025-02-15

## 2025-02-18 PROBLEM — E43 SEVERE PROTEIN-CALORIE MALNUTRITION: Status: ACTIVE | Noted: 2025-02-16

## 2025-02-18 PROBLEM — K25.9 GASTRIC ULCERATION: Status: ACTIVE | Noted: 2025-02-14

## 2025-02-18 PROBLEM — D50.0 IRON DEFICIENCY ANEMIA DUE TO CHRONIC BLOOD LOSS: Status: ACTIVE | Noted: 2025-02-14

## 2025-02-18 LAB — HBA1C MFR BLD: 6.9 %

## 2025-02-18 PROCEDURE — 3078F DIAST BP <80 MM HG: CPT | Performed by: FAMILY MEDICINE

## 2025-02-18 PROCEDURE — 1111F DSCHRG MED/CURRENT MED MERGE: CPT | Performed by: FAMILY MEDICINE

## 2025-02-18 PROCEDURE — 99215 OFFICE O/P EST HI 40 MIN: CPT | Performed by: FAMILY MEDICINE

## 2025-02-18 PROCEDURE — 3075F SYST BP GE 130 - 139MM HG: CPT | Performed by: FAMILY MEDICINE

## 2025-02-18 PROCEDURE — 3044F HG A1C LEVEL LT 7.0%: CPT | Performed by: FAMILY MEDICINE

## 2025-02-18 PROCEDURE — 83036 HEMOGLOBIN GLYCOSYLATED A1C: CPT | Performed by: FAMILY MEDICINE

## 2025-02-18 RX ORDER — POLYETHYLENE GLYCOL 3350 17 G/17G
17 POWDER, FOR SOLUTION ORAL DAILY
COMMUNITY
Start: 2025-02-08 | End: 2025-02-18

## 2025-02-18 RX ORDER — ONDANSETRON 4 MG/1
TABLET, FILM COATED ORAL
COMMUNITY
Start: 2025-02-16 | End: 2025-02-18

## 2025-02-18 RX ORDER — PANTOPRAZOLE SODIUM 40 MG/1
40 TABLET, DELAYED RELEASE ORAL 2 TIMES DAILY
Qty: 60 TABLET | Refills: 2 | Status: SHIPPED | OUTPATIENT
Start: 2025-02-18 | End: 2025-05-19

## 2025-02-18 RX ORDER — LACTULOSE 10 G/15ML
15 SOLUTION ORAL; RECTAL 3 TIMES DAILY PRN
COMMUNITY
Start: 2025-02-16

## 2025-02-18 RX ORDER — OXYCODONE HYDROCHLORIDE 5 MG/1
5 TABLET ORAL EVERY 8 HOURS PRN
COMMUNITY
Start: 2025-02-16

## 2025-02-18 RX ORDER — SIMETHICONE 40MG/0.6ML
15 SUSPENSION, DROPS(FINAL DOSAGE FORM)(ML) ORAL EVERY 6 HOURS PRN
Qty: 355 ML | Refills: 0 | Status: SHIPPED | OUTPATIENT
Start: 2025-02-18

## 2025-02-18 RX ORDER — PANTOPRAZOLE SODIUM 40 MG/1
40 TABLET, DELAYED RELEASE ORAL 2 TIMES DAILY
COMMUNITY
Start: 2025-02-16 | End: 2025-02-18 | Stop reason: SDUPTHER

## 2025-02-18 RX ORDER — FERROUS SULFATE 325(65) MG
325 TABLET ORAL
Qty: 90 TABLET | Refills: 1 | Status: SHIPPED | OUTPATIENT
Start: 2025-02-18

## 2025-02-18 SDOH — ECONOMIC STABILITY: FOOD INSECURITY: WITHIN THE PAST 12 MONTHS, YOU WORRIED THAT YOUR FOOD WOULD RUN OUT BEFORE YOU GOT MONEY TO BUY MORE.: NEVER TRUE

## 2025-02-18 SDOH — ECONOMIC STABILITY: FOOD INSECURITY: WITHIN THE PAST 12 MONTHS, THE FOOD YOU BOUGHT JUST DIDN'T LAST AND YOU DIDN'T HAVE MONEY TO GET MORE.: NEVER TRUE

## 2025-02-18 ASSESSMENT — PATIENT HEALTH QUESTIONNAIRE - PHQ9
2. FEELING DOWN, DEPRESSED OR HOPELESS: NOT AT ALL
SUM OF ALL RESPONSES TO PHQ QUESTIONS 1-9: 0
SUM OF ALL RESPONSES TO PHQ9 QUESTIONS 1 & 2: 0
1. LITTLE INTEREST OR PLEASURE IN DOING THINGS: NOT AT ALL
SUM OF ALL RESPONSES TO PHQ QUESTIONS 1-9: 0

## 2025-02-18 NOTE — PROGRESS NOTES
YASMINE Lora comees in for post hospital stay. She was admitted at   on 2 occasions. She was initially admitted from  01/29/2025-02/01/2025 for:  Abdominal pain with nausea and vomiting, Constipation, Hypokalemia, Hypomagnesemia, Type 2 diabetes mellitus, Hypertension, Hyperlipidemia.  Patient's second admission was from 02/14/2025-02/16/2025 during which an EGD was done and patient noted to have a nonbleeding gastric ulcer.    Peptic ulcer/abdominal pain:   Patient was initially admitted at [facility] with abdominal pain nausea and vomiting.  She had a CT scan done of the abdomen and pelvis that showed enlarged uterus with calcified uterine fibroids.  There was a question of left renal vein occlusion or stenosis.  Patient was ruled out for nutcracker syndrome.  She was discharged home after stabilization and management.  She however continued to have abdominal pain.She was later seen at  and had another CT abdomen pelvis that was reported as:  CT ABD/PELVIS-IV ONLY   Final Result   1. The study shows a pattern of severe focal ulceration in the posterior wall of the distal body of stomach. The ulcer does not penetrate into the lesser sac has extensive and severe associated submucosal edema in the distal body, antrum and pylorus distribution. There are no definite imaging appearances to suggest malignancy, although not excluded. The associated lymphadenopathy in the gastrohepatic ligament and surrounding the celiac trunk may be reactive. GI consultation with a view to endoscopy +/- biopsy is recommended.   2. There is a heavy burden of soft stool in the ascending and transverse colon. The large bowel and small bowel are otherwise unremarkable.   3. 2.2 cm lesion in the left adrenal gland is likely to represent an adrenal adenoma. Dedicated nonemergent adrenal protocol CT abdomen and pelvis without and with IV contrast can be considered.   4. Enlarged fibroid

## 2025-02-18 NOTE — PROGRESS NOTES
\"Have you been to the ER, urgent care clinic since your last visit?  Hospitalized since your last visit?\"    YES - When: approximately 2  weeks ago.  Where and Why: ..    “Have you seen or consulted any other health care providers outside our system since your last visit?”    NO      “Have you had a colorectal cancer screening such as a colonoscopy/FIT/Cologuard?    YES - Type: Colonoscopy - Where: [facility] Nurse/CMA to request most recent records if not in the chart     No colonoscopy on file  No cologuard on file  Date of last FIT: 7/25/2021   No flexible sigmoidoscopy on file     “Have you had a diabetic eye exam?”    NO     Date of last diabetic eye exam: 10/28/2020

## 2025-02-20 ENCOUNTER — CARE COORDINATION (OUTPATIENT)
Facility: CLINIC | Age: 60
End: 2025-02-20

## 2025-02-20 NOTE — CARE COORDINATION
Care Transitions Note    Follow Up Call     Patient Current Location:  [place]    Care Transition Nurse contacted the patient by telephone. Verified name and  as identifiers.    Additional needs identified to be addressed with provider   No needs identified                 Method of communication with provider: none.    Care Summary Note: Patient was readmitted to  ED -25 for persistent constipation and N/V, hyponatremia and hypokalemia, and severe acute malnutrition. EGD + for non-bleeding gastric ulcer, s/p biopsy and c-scope showed a polyp. Abdominal CT showed gastric mass concerning for malignancy. She attended PCP appointment on 25.    Patient confirmed understanding that a gatric ulcer was discovered on her EGD. She reports she's feeling much better. Denies any recent N/V. Constipation has improved, noting her last BM was 2 days ago and was formed.        Plan of care updates since last contact:  Education: CTN reviewed food groups that are high in fiber and encouraged continued small, frequent meals. Advised patient to take Protonix on an empty stomach at least 30 min prior to eating. Reviewed how often she can take lactulose for constipation.  Review of patient management of conditions/medications: Confirmed patient is now taking lactulose PRN, Protonix BID, and oxycodone for throat pain s/p EGD. Confirmed she is on a high fiber diet and is eating small, frequent meals, but still eating less than usual.  Referrals: Confirmed PCP placed referral to GI Dr. Colton Schmitt, as recommended by [facility]. Patient has not yet scheduled an appt and referral status is pending. Patient confirmed her preferred location is their [facility] office in [place].        Advance Care Planning:   Does patient have an Advance Directive: reviewed during previous call, see note. .    Medication Review:  Medications changed since last call, reviewed today.     Remote Patient Monitoring:  Offered

## 2025-02-24 ENCOUNTER — CARE COORDINATION (OUTPATIENT)
Facility: CLINIC | Age: 60
End: 2025-02-24

## 2025-02-25 ENCOUNTER — ENROLLMENT (OUTPATIENT)
Facility: CLINIC | Age: 60
End: 2025-02-25

## 2025-02-25 ENCOUNTER — COMMUNITY OUTREACH (OUTPATIENT)
Facility: CLINIC | Age: 60
End: 2025-02-25

## 2025-02-25 ENCOUNTER — CARE COORDINATION (OUTPATIENT)
Facility: CLINIC | Age: 60
End: 2025-02-25

## 2025-02-25 DIAGNOSIS — I95.1 ORTHOSTATIC HYPOTENSION: ICD-10-CM

## 2025-02-25 DIAGNOSIS — R55 SYNCOPE AND COLLAPSE: Primary | ICD-10-CM

## 2025-02-25 RX ORDER — OMEPRAZOLE 20 MG/1
20 CAPSULE, DELAYED RELEASE ORAL 2 TIMES DAILY
COMMUNITY
Start: 2025-02-09 | End: 2025-02-28

## 2025-02-25 RX ORDER — BLOOD-GLUCOSE METER
EACH MISCELLANEOUS
COMMUNITY
Start: 2025-02-24 | End: 2025-02-28

## 2025-02-25 NOTE — PROGRESS NOTES
Care Everywhere audit shows patient had a colonoscopy done. Health maintenance has been updated.

## 2025-02-25 NOTE — CARE COORDINATION
Care Transitions Note    Initial Call - Call within 2 business days of discharge: Yes    Patient Current Location:  Home: [address] [place] [address]  [address]  [facility] [id number]    Care Transition Nurse contacted the patient by telephone to perform post hospital discharge assessment, verified name and  as identifiers. Provided introduction to self, and explanation of the Care Transition Nurse role.     Patient: Mandy Lora    Patient : 1965   MRN: [id number]    Reason for Admission: Syncope and collapse d/t orthostatic hypotension  Discharge Date:    RURS: No data recorded    Last Discharge Facility       None            Was this an external facility discharge? Yes. Discharge Date: 25. [facility] Name: [facility]    Additional needs identified to be addressed with provider   Medication: Patient reported she is taking Lantus 25 units daily. Per d/c instructions, patient to take between 15-20 units. During previous admission, patient reported taking 20 units daily. Please advise.     Referral: CTN contacted [facility] to follow up on referral. They voiced they have not received. Please fax GI referral to [facility] at [phone] (office of Dr. Colton Schmitt).         Method of communication with provider: chart routing.    Patients top risk factors for readmission: medical condition-orthostatic hypotension, DM, gastric ulcer    Interventions to address risk factors:   Review of patient management of conditions/medications: Reviewed meds. Informed patient she is to stop taking Norvasc, HCTZ and Zofan per discharge instructions. Patient denied having lactulose however patient was able to locate bottle and voiced she is taking. Confirmed with patient she received One Touch Verio glucose monitoring kit. Patient voiced she has not checked BS today. CTN advised patient to check blood sugar before breakfast, lunch and dinner.   Referrals: CTN contacted [facility] to follow up on GI

## 2025-02-26 ENCOUNTER — HOSPITAL ENCOUNTER (OUTPATIENT)
Facility: HOSPITAL | Age: 60
Discharge: HOME | End: 2025-03-01
Payer: MEDICARE

## 2025-02-26 DIAGNOSIS — R10.10 PAIN OF UPPER ABDOMEN: ICD-10-CM

## 2025-02-26 DIAGNOSIS — D35.00 ADRENAL ADENOMA, UNSPECIFIED LATERALITY: ICD-10-CM

## 2025-02-26 LAB — CREAT UR-MCNC: 0.5 MG/DL (ref 0.6–1.3)

## 2025-02-26 PROCEDURE — 6360000004 HC RX CONTRAST MEDICATION: Performed by: FAMILY MEDICINE

## 2025-02-26 PROCEDURE — 82565 ASSAY OF CREATININE: CPT

## 2025-02-26 PROCEDURE — 74178 CT ABD&PLV WO CNTR FLWD CNTR: CPT

## 2025-02-26 RX ORDER — IOPAMIDOL 612 MG/ML
100 INJECTION, SOLUTION INTRAVASCULAR
Status: COMPLETED | OUTPATIENT
Start: 2025-02-26 | End: 2025-02-26

## 2025-02-26 RX ADMIN — IOPAMIDOL 100 ML: 612 INJECTION, SOLUTION INTRAVENOUS at 11:44

## 2025-02-27 ENCOUNTER — CARE COORDINATION (OUTPATIENT)
Facility: CLINIC | Age: 60
End: 2025-02-27

## 2025-02-27 NOTE — CARE COORDINATION
CTN informed patient per PCP, she is decrease Lantus dose from 25 units to 15 units daily. Patient made aware her most recent A1C was 6.9. CTN also advised patient if blood sugar is < 70 or experiencing low blood sugar (hypoglycemia) red flags to drink 4 oz of fruit juice or take one glucose tab, wait 15 mins and recheck blood sugar. Patient advised to repeat these steps until blood sugar gets above 70. Once blood sugar above 70 patient is to eat regular meal scheduled for that time. Advised patient if glucose tabs are not available to drink fruit juices like OJ or grape juice works faster because it gets into he blood stream faster than candy. Also suggested patient purchase OTC glucose tabs. Advised patient he can ask pharmacist at local drug store. Patient verbalized understanding of instructions. CTN asked if patient has BP cuff. Patient voiced she does not have a cuff at home.

## 2025-02-28 ENCOUNTER — OFFICE VISIT (OUTPATIENT)
Facility: CLINIC | Age: 60
End: 2025-02-28

## 2025-02-28 VITALS
DIASTOLIC BLOOD PRESSURE: 74 MMHG | HEIGHT: 64 IN | WEIGHT: 129.6 LBS | RESPIRATION RATE: 20 BRPM | SYSTOLIC BLOOD PRESSURE: 134 MMHG | HEART RATE: 77 BPM | OXYGEN SATURATION: 100 % | BODY MASS INDEX: 22.13 KG/M2 | TEMPERATURE: 97.9 F

## 2025-02-28 DIAGNOSIS — E11.22 TYPE 2 DIABETES MELLITUS WITH CHRONIC KIDNEY DISEASE, WITHOUT LONG-TERM CURRENT USE OF INSULIN, UNSPECIFIED CKD STAGE (HCC): ICD-10-CM

## 2025-02-28 DIAGNOSIS — I10 ESSENTIAL (PRIMARY) HYPERTENSION: ICD-10-CM

## 2025-02-28 DIAGNOSIS — R55 SYNCOPE, UNSPECIFIED SYNCOPE TYPE: Primary | ICD-10-CM

## 2025-02-28 DIAGNOSIS — E83.42 HYPOMAGNESEMIA: ICD-10-CM

## 2025-02-28 DIAGNOSIS — F17.200 TOBACCO USE DISORDER: ICD-10-CM

## 2025-02-28 DIAGNOSIS — E78.5 HYPERLIPIDEMIA, UNSPECIFIED HYPERLIPIDEMIA TYPE: ICD-10-CM

## 2025-02-28 DIAGNOSIS — K27.9 PEPTIC ULCER: ICD-10-CM

## 2025-02-28 DIAGNOSIS — Z09 HOSPITAL DISCHARGE FOLLOW-UP: ICD-10-CM

## 2025-02-28 DIAGNOSIS — E07.9 THYROID DISORDER: ICD-10-CM

## 2025-02-28 DIAGNOSIS — K21.9 GASTRO-ESOPHAGEAL REFLUX DISEASE WITHOUT ESOPHAGITIS: ICD-10-CM

## 2025-02-28 DIAGNOSIS — D72.829 LEUKOCYTOSIS, UNSPECIFIED TYPE: ICD-10-CM

## 2025-02-28 RX ORDER — INSULIN GLARGINE 100 [IU]/ML
15 INJECTION, SOLUTION SUBCUTANEOUS NIGHTLY
Qty: 30 ML | Refills: 1 | Status: SHIPPED | OUTPATIENT
Start: 2025-02-28

## 2025-02-28 RX ORDER — MULTIVITAMIN WITH IRON
250 TABLET ORAL DAILY
Qty: 90 TABLET | Refills: 0 | Status: SHIPPED | OUTPATIENT
Start: 2025-02-28

## 2025-02-28 RX ORDER — HYDROCHLOROTHIAZIDE 12.5 MG/1
12.5 TABLET ORAL DAILY
Qty: 90 TABLET | Refills: 1 | Status: SHIPPED | OUTPATIENT
Start: 2025-02-28

## 2025-02-28 SDOH — ECONOMIC STABILITY: FOOD INSECURITY: WITHIN THE PAST 12 MONTHS, THE FOOD YOU BOUGHT JUST DIDN'T LAST AND YOU DIDN'T HAVE MONEY TO GET MORE.: NEVER TRUE

## 2025-02-28 SDOH — ECONOMIC STABILITY: FOOD INSECURITY: WITHIN THE PAST 12 MONTHS, YOU WORRIED THAT YOUR FOOD WOULD RUN OUT BEFORE YOU GOT MONEY TO BUY MORE.: NEVER TRUE

## 2025-02-28 ASSESSMENT — PATIENT HEALTH QUESTIONNAIRE - PHQ9
SUM OF ALL RESPONSES TO PHQ QUESTIONS 1-9: 0
1. LITTLE INTEREST OR PLEASURE IN DOING THINGS: NOT AT ALL
SUM OF ALL RESPONSES TO PHQ QUESTIONS 1-9: 0
SUM OF ALL RESPONSES TO PHQ9 QUESTIONS 1 & 2: 0
SUM OF ALL RESPONSES TO PHQ QUESTIONS 1-9: 0
2. FEELING DOWN, DEPRESSED OR HOPELESS: NOT AT ALL
SUM OF ALL RESPONSES TO PHQ QUESTIONS 1-9: 0

## 2025-02-28 ASSESSMENT — ANXIETY QUESTIONNAIRES
IF YOU CHECKED OFF ANY PROBLEMS ON THIS QUESTIONNAIRE, HOW DIFFICULT HAVE THESE PROBLEMS MADE IT FOR YOU TO DO YOUR WORK, TAKE CARE OF THINGS AT HOME, OR GET ALONG WITH OTHER PEOPLE: NOT DIFFICULT AT ALL
GAD7 TOTAL SCORE: 0
7. FEELING AFRAID AS IF SOMETHING AWFUL MIGHT HAPPEN: NOT AT ALL
5. BEING SO RESTLESS THAT IT IS HARD TO SIT STILL: NOT AT ALL
1. FEELING NERVOUS, ANXIOUS, OR ON EDGE: NOT AT ALL
2. NOT BEING ABLE TO STOP OR CONTROL WORRYING: NOT AT ALL
4. TROUBLE RELAXING: NOT AT ALL
6. BECOMING EASILY ANNOYED OR IRRITABLE: NOT AT ALL
3. WORRYING TOO MUCH ABOUT DIFFERENT THINGS: NOT AT ALL

## 2025-02-28 NOTE — PROGRESS NOTES
HPI  Mandy Lora comes in for transitional care. She was admitted at [facility] from 02/22/2025-02/24/2025 for:  Discharge Diagnosis:   Syncope and collapse due to orthostatic hypotension  Hypokalemia  Type II Diabetes Mellitus  Normotension  HLD  Tobacco use     Syncope: Patient was seated at home with the dinner table with her family when she passed out.  Denies chest pain, shortness of breath, wheeze or diaphoresis.  She was seen in the emergency room where various tests were done including CT scan of the head, EKG and lab tests patient is noted to have had syncopal episode due to orthostatic hypotension.  She has not had any repeat episode since then.  She will benefit from evaluation by the cardiologist.  May need to have Holter monitor placed.  Referral is placed.  Patient has had carotid ultrasound done and also an echocardiogram done. Carotid PVL reported as:  Conclusions: Mild plaque (<50%) is present in the bilateral internal carotid arteries that is not associated with a hemodynamically significant stenosis.    Antegrade flow with a normal hemodynamic profile was present in both vertebral arteries.   Echocardiogram was reported as:  CONCLUSIONS    * Normal left ventricular cavity size and wall thickness.     * No regional wall motion abnormalities.     * Normal left ventricular systolic function.     * Visually estimated ejection fraction 60 to 65%.     * Normal diastolic function.     * Normal right ventricular size and function.     * Normal left atrial size.     * Normal right atrial size.     * No hemodynamically significant valve pathology.     * RVSP 25 to 30 mmHg.   DM2: Patient has type 2 diabetes mellitus.  Her blood glucose numbers have been fluctuating.  Her HbA1c is 6.9.  Previously patient had been taking 25 units of Lantus but this has been cut back to 15 units daily.  She is also on metformin at 1000 mg twice a day.  She will keep a blood glucose log and we will follow-up at

## 2025-02-28 NOTE — PROGRESS NOTES
\"Have you been to the ER, urgent care clinic since your last visit?  Hospitalized since your last visit?\"      Syncope and collapse (Primary)   [E87.6] Hypokalemia   [R94.31] Shortened OK interval       “Have you seen or consulted any other health care providers outside our system since your last visit?”    NO      “Have you had a diabetic eye exam?”    NO     Date of last diabetic eye exam: 10/28/2020

## 2025-03-04 ENCOUNTER — CARE COORDINATION (OUTPATIENT)
Facility: CLINIC | Age: 60
End: 2025-03-04

## 2025-03-04 NOTE — CARE COORDINATION
Care Transitions Note    Follow Up Call     Patient Current Location:  Home: [address]  [address]  [facility] [address]    Care Transition Nurse contacted the patient by telephone. Verified name and  as identifiers.    Additional needs identified to be addressed with provider   None          Method of communication with provider: chart routing.    Care Summary Note: Patient voiced she is good. Denied dizziness/lightheadedness, SOB, or CP. Patient voiced she does not have a BP cuff at home. Suggested ordering from [employer]. Patient voiced she does not use [employer]. CTN suggested patient check her local pharmacy for an affordable blood pressure monitor. Patient reported FBS has been between . Did report FBS of 221. Patient voiced she is not sure why.     Plan of care updates since last contact:  Referrals: CTN contacted [facility] to verify receipt of referral. Spoke with Briseida and provided 2 patient identifiers. CTN informed Dr. Colton Schmitt fall under the [facility] however he [place] not practice in [place] but does take call at [facility]. CTN will follow up with clinical staff regarding GI referral.         Advance Care Planning:   Does patient have an Advance Directive: not on file; education provided - Patient not interested in completing AMD at this time and   Primary Decision Maker (Active): Owen Lora - Child - [phone] .    Medication Review:  Medications changed since last call, reviewed today.  Lantus decreased to 15 units daily during PCP follow up appt in .    Remote Patient Monitoring:  Offered patient enrollment in the Remote Patient Monitoring (RPM) program for in-home monitoring: Yes, but did not enroll at this time: controlled chronic disease management. A1C 6.9    Assessments:  No changes since last call    Follow Up Appointment:   Reviewed upcoming appointment(s).  Future Appointments         Provider Specialty Dept Phone    3/25/2025 9:30 AM Maxime Killian MD

## 2025-03-11 ENCOUNTER — COMMUNITY OUTREACH (OUTPATIENT)
Facility: CLINIC | Age: 60
End: 2025-03-11

## 2025-03-11 NOTE — PROGRESS NOTES
Patient's HM shows they are overdue for Colorectal Screening.   Care Everywhere and  files searched.   up to date with most recent colonoscopy.

## 2025-03-14 ENCOUNTER — CARE COORDINATION (OUTPATIENT)
Facility: CLINIC | Age: 60
End: 2025-03-14

## 2025-03-14 NOTE — CARE COORDINATION
Care Transitions Note    Follow Up Call     Attempted to reach patient for transitions of care follow up.  Unable to reach patient.      Outreach Attempts:   Unable to leave message.  PHI not on file.    Care Summary Note: Patient recently admitted to [facility] 2/22-2/24: Syncope and collapse d/t orthostatic hypotension. Patient attended transition of care appointment with PCP on 2/28/25. PCP decreased Lantus dosage to 15 units daily. Referral placed for holter monitor. During admission gastric ulcer was found on CT and GI follow up recommended. Referral was sent to [facility]. CTN contacted office today. Yulisa voiced office has attempted to reach patient. No appt has been scheduled at this time. Labs indicated low magnesium. PCP prescribed magnesium replacement. Patient was advised to maintain a blood glucose log. Was also advised to monitor BP however patient does not have a BP monitor at home. During last follow up CTN advised patient to check local pharmacy for an affordable BP monitor.    Follow Up Appointment:   Future Appointments         Provider Specialty Dept Phone    3/25/2025 9:30 AM Maxime Killian MD Family Medicine [phone]    4/21/2025 9:00 AM Maxime Killian MD Family Medicine [phone]    5/27/2025 8:15 AM Guido Ontiveros MD Cardiology [phone]    11/17/2025 9:30 AM Maxime Killian MD Family Medicine [phone]            Plan for follow-up call in 6-10 days based on severity of symptoms and risk factors. Plan for next call: symptom management-assess for syncope red flags, hypotension  self management-review blood glucose reading and if patient was able to obtain a BP monitor  follow-up appointment-follow up on scheduling of GI appt  medication management-confirm adherence to Lantus 15 units daily    Arianna Adhikari RN

## 2025-03-20 PROBLEM — N39.0 UTI (URINARY TRACT INFECTION): Status: RESOLVED | Noted: 2025-01-29 | Resolved: 2025-03-20

## 2025-03-21 ENCOUNTER — CARE COORDINATION (OUTPATIENT)
Facility: CLINIC | Age: 60
End: 2025-03-21

## 2025-03-21 NOTE — CARE COORDINATION
Care Transitions Note    Follow Up Call     Patient Current Location:  Home: [address]  [address]  [facility] [facility] [address]    N Care Coordinator contacted the patient by telephone. Verified name and  as identifiers.    Additional needs identified to be addressed with provider   No needs identified                 Method of communication with provider: none.    Care Summary Note:   Spoke with patient.  Patient states she is doing good.  Patient reports blood sugar in 130's. Patient confirms taking Lantus 15 units daily.  Patient states her hasn't checked her b/p today. Yesterday systolic was 121.   Patient states she able to check her b/p at home.  Patient as no questions or concerns.  Patient states she will follow up with GI on 3/27.    Advance Care Planning:   Does patient have an Advance Directive: reviewed during previous call, see note. .    Medication Review:  No changes since last call.       Assessments:  No changes since last call    Follow Up Appointment:   Reviewed upcoming appointment(s).  Future Appointments         Provider Specialty Dept Phone    3/25/2025 9:30 AM Maxime Killian MD Family Medicine [phone]    2025 9:00 AM Maxime Killian MD Family Medicine [phone]    2025 8:15 AM Guido Ontiveros MD Cardiology [phone]    2025 9:30 AM Maxime Killian MD Family Medicine [phone]            LPN Care Coordinator provided contact information.  Plan for follow-up call in 6-10 days based on severity of symptoms and risk factors.  Plan for next call:  final call      Kim Vincent LPN

## 2025-03-25 ENCOUNTER — OFFICE VISIT (OUTPATIENT)
Facility: CLINIC | Age: 60
End: 2025-03-25
Payer: MEDICARE

## 2025-03-25 VITALS
TEMPERATURE: 97.6 F | WEIGHT: 129 LBS | BODY MASS INDEX: 21.49 KG/M2 | HEART RATE: 88 BPM | HEIGHT: 65 IN | DIASTOLIC BLOOD PRESSURE: 65 MMHG | SYSTOLIC BLOOD PRESSURE: 119 MMHG | RESPIRATION RATE: 20 BRPM

## 2025-03-25 DIAGNOSIS — E11.22 TYPE 2 DIABETES MELLITUS WITH CHRONIC KIDNEY DISEASE, WITHOUT LONG-TERM CURRENT USE OF INSULIN, UNSPECIFIED CKD STAGE (HCC): Primary | ICD-10-CM

## 2025-03-25 DIAGNOSIS — K86.9 LESION OF PANCREAS: ICD-10-CM

## 2025-03-25 DIAGNOSIS — K21.9 GASTRO-ESOPHAGEAL REFLUX DISEASE WITHOUT ESOPHAGITIS: ICD-10-CM

## 2025-03-25 DIAGNOSIS — K25.7 CHRONIC GASTRIC ULCER, UNSPECIFIED WHETHER GASTRIC ULCER HEMORRHAGE OR PERFORATION PRESENT: ICD-10-CM

## 2025-03-25 DIAGNOSIS — I10 ESSENTIAL (PRIMARY) HYPERTENSION: ICD-10-CM

## 2025-03-25 DIAGNOSIS — F17.200 TOBACCO USE DISORDER: ICD-10-CM

## 2025-03-25 DIAGNOSIS — R55 SYNCOPE, UNSPECIFIED SYNCOPE TYPE: ICD-10-CM

## 2025-03-25 DIAGNOSIS — E78.5 HYPERLIPIDEMIA, UNSPECIFIED HYPERLIPIDEMIA TYPE: ICD-10-CM

## 2025-03-25 PROCEDURE — 3044F HG A1C LEVEL LT 7.0%: CPT | Performed by: FAMILY MEDICINE

## 2025-03-25 PROCEDURE — 3078F DIAST BP <80 MM HG: CPT | Performed by: FAMILY MEDICINE

## 2025-03-25 PROCEDURE — 99215 OFFICE O/P EST HI 40 MIN: CPT | Performed by: FAMILY MEDICINE

## 2025-03-25 PROCEDURE — 3074F SYST BP LT 130 MM HG: CPT | Performed by: FAMILY MEDICINE

## 2025-03-25 NOTE — PROGRESS NOTES
\"Have you been to the ER, urgent care clinic since your last visit?  Hospitalized since your last visit?\"    NO    “Have you seen or consulted any other health care providers outside our system since your last visit?”    NO      “Have you had a diabetic eye exam?”    NO     Date of last diabetic eye exam: 10/28/2020

## 2025-03-25 NOTE — PROGRESS NOTES
HPI  Mandy Lora comes in for follow up care.  DM2: Patient has type 2 diabetes mellitus.  She is on metformin at 1000 mg twice a day with meals.  She is on insulin Lantus 15 units nightly.  She comes in with blood glucose log.  Her numbers have been stable.  Her last HbA1c was 6.9.  HTN: Patient has hypertension.  Blood pressure is stable.  Denies headache, changes in vision or focal weakness.  Patient is on HCTZ 12.5 mg daily.  We will continue with the current treatment plan.  She will take a low-sodium diet.  Dyslipidemia: Patient has dyslipidemia.  She is on atorvastatin 80 mg daily.  She will exercise and take a diet low in polysaturated fats.  GERD: Patient has gastroesophageal reflux disease.  Gets heartburn on and off.  Denies dark stools or hematemesis.  She is on Protonix 40 mg daily.  Continue current treatment plan.\  Peptic ulcer disease: Patient noted to have peptic ulcer disease.  She had an EGD done.  This showed a peptic ulcer with clean base.  She will continue to follow-up with a gastroenterologist.  Denies hematemesis or dark stools.  She is on the Protonix.  Pancreatic lesion: Patient had a CT scan of the abdomen that noted pancreatic lesion and recommendations were to get recheck MRI in 1 year.  Patient will be referred to a gastroenterologist for evaluation of this.  CT abdomen pelvis was reported as:  IMPRESSION:  1. Left adrenal adenoma for which no further follow-up is recommended.  2. Gastritis with penetrating ulcer at the lesser curve. Marked improvement since prior. No extraluminal air or fluid collection. No gastric outlet obstruction.  3. Possible IPMN at the pancreas. Recommend follow-up with MRI in 1 year.  4. Other chronic incidental findings as above.  Tobacco abuse disorder: Patient continues to smoke.  Has been advised against the habit.  She is not ready to quit this habit.  She is aware that there is nicotine replacement therapy and medications that can help assist with

## 2025-03-27 ENCOUNTER — CARE COORDINATION (OUTPATIENT)
Facility: CLINIC | Age: 60
End: 2025-03-27

## 2025-03-27 NOTE — CARE COORDINATION
Care Transitions Note    Final Call     Patient Current Location:  Home: [address]  [address]  [facility] [address]    LPN Care Coordinator contacted the patient by telephone. Verified name and  as identifiers.    Patient graduated from the Care Transitions program on 25.  Patient/family verbalizes confidence in the ability to self-manage at this time..      Advance Care Planning:   Does patient have an Advance Directive: reviewed during previous call, see note. .    Handoff:   Patient was not referred to the ACM team due to no additional needs identified.       Care Summary Note:   Spoke with patient.  Patient states she is doing fine.  Patient reports bs was good this morning.  Patient followed up with pcp on 3/25.  Patient has no questions or concerns.    Assessments:  Care Transitions Subsequent and Final Call    Subsequent and Final Calls  Do you have any ongoing symptoms?: No  Have your medications changed?: No  Do you have any questions related to your medications?: No  Do you currently have any active services?: No  Are you currently active with any services?: Home Health  Do you have any needs or concerns that I can assist you with?: No  Identified Barriers: None  Care Transitions Interventions  No Identified Needs    Specialty Service Referral: Completed    Other Interventions:            No changes since last call    Upcoming Appointments:    Future Appointments         Provider Specialty Dept Phone    2025 9:00 AM Maxime Killian MD Family Medicine [phone]    2025 8:15 AM Guido Ontiveros MD Cardiology [phone]    2025 9:30 AM Maxime Killian MD Family Medicine [phone]            Patient has agreed to contact primary care provider and/or specialist for any further questions, concerns, or needs.    Kim Vincent LPN

## 2025-04-21 ENCOUNTER — OFFICE VISIT (OUTPATIENT)
Facility: CLINIC | Age: 60
End: 2025-04-21
Payer: MEDICARE

## 2025-04-21 VITALS
OXYGEN SATURATION: 99 % | BODY MASS INDEX: 23.05 KG/M2 | TEMPERATURE: 98 F | WEIGHT: 135 LBS | DIASTOLIC BLOOD PRESSURE: 66 MMHG | HEART RATE: 72 BPM | HEIGHT: 64 IN | SYSTOLIC BLOOD PRESSURE: 128 MMHG | RESPIRATION RATE: 20 BRPM

## 2025-04-21 DIAGNOSIS — K25.7 CHRONIC GASTRIC ULCER, UNSPECIFIED WHETHER GASTRIC ULCER HEMORRHAGE OR PERFORATION PRESENT: ICD-10-CM

## 2025-04-21 DIAGNOSIS — K21.9 GASTRO-ESOPHAGEAL REFLUX DISEASE WITHOUT ESOPHAGITIS: ICD-10-CM

## 2025-04-21 DIAGNOSIS — E11.22 TYPE 2 DIABETES MELLITUS WITH CHRONIC KIDNEY DISEASE, WITHOUT LONG-TERM CURRENT USE OF INSULIN, UNSPECIFIED CKD STAGE (HCC): Primary | ICD-10-CM

## 2025-04-21 DIAGNOSIS — E78.5 HYPERLIPIDEMIA, UNSPECIFIED HYPERLIPIDEMIA TYPE: ICD-10-CM

## 2025-04-21 DIAGNOSIS — R55 SYNCOPE, UNSPECIFIED SYNCOPE TYPE: ICD-10-CM

## 2025-04-21 DIAGNOSIS — I10 ESSENTIAL (PRIMARY) HYPERTENSION: ICD-10-CM

## 2025-04-21 LAB — HBA1C MFR BLD: 7.9 %

## 2025-04-21 PROCEDURE — 3051F HG A1C>EQUAL 7.0%<8.0%: CPT | Performed by: FAMILY MEDICINE

## 2025-04-21 PROCEDURE — 99214 OFFICE O/P EST MOD 30 MIN: CPT | Performed by: FAMILY MEDICINE

## 2025-04-21 PROCEDURE — 83036 HEMOGLOBIN GLYCOSYLATED A1C: CPT | Performed by: FAMILY MEDICINE

## 2025-04-21 PROCEDURE — 3078F DIAST BP <80 MM HG: CPT | Performed by: FAMILY MEDICINE

## 2025-04-21 PROCEDURE — 3074F SYST BP LT 130 MM HG: CPT | Performed by: FAMILY MEDICINE

## 2025-04-21 RX ORDER — PANTOPRAZOLE SODIUM 40 MG/1
40 TABLET, DELAYED RELEASE ORAL 2 TIMES DAILY
Qty: 60 TABLET | Refills: 2 | Status: SHIPPED | OUTPATIENT
Start: 2025-04-21 | End: 2025-07-20

## 2025-04-21 RX ORDER — ATORVASTATIN CALCIUM 80 MG/1
80 TABLET, FILM COATED ORAL DAILY
Qty: 90 TABLET | Refills: 1 | Status: SHIPPED | OUTPATIENT
Start: 2025-04-21

## 2025-04-21 ASSESSMENT — PATIENT HEALTH QUESTIONNAIRE - PHQ9
SUM OF ALL RESPONSES TO PHQ QUESTIONS 1-9: 0
2. FEELING DOWN, DEPRESSED OR HOPELESS: NOT AT ALL
1. LITTLE INTEREST OR PLEASURE IN DOING THINGS: NOT AT ALL
SUM OF ALL RESPONSES TO PHQ QUESTIONS 1-9: 0

## 2025-04-21 NOTE — PROGRESS NOTES
\"Have you been to the ER, urgent care clinic since your last visit?  Hospitalized since your last visit?\"    NO    “Have you seen or consulted any other health care providers outside our system since your last visit?”    NO      “Have you had a diabetic eye exam?”    NO     Date of last diabetic eye exam: 10/28/2020          
atorvastatin (LIPITOR) 80 MG tablet TAKE 1 TABLET BY MOUTH DAILY 90 tablet 1    acetaminophen (TYLENOL) 500 MG tablet Take 1 tablet by mouth every 6 hours as needed      Lancets (ONETOUCH DELICA PLUS [id number]) MISC USE THREE TIMES DAILY 300 each 3    Insulin Pen Needle (B-D UF III MINI PEN NEEDLES) 31G X 5 MM MISC USE TO ADMINISTER INSULIN DAILY 100 each 3    magnesium (MAGNESIUM-OXIDE) 250 MG TABS tablet Take 1 tablet by mouth daily (Patient not taking: Reported on 4/21/2025) 90 tablet 0     Current Facility-Administered Medications   Medication Dose Route Frequency Provider Last Rate Last Admin    lidocaine 1 % injection 5 mL  5 mL IntraDERmal Once Maxime Killian MD           Allergies  No Known Allergies    Family History  History reviewed. No pertinent family history.    Social History  Social History     Socioeconomic History    Marital status: Single     Spouse name: Not on file    Number of children: Not on file    Years of education: Not on file    Highest education level: Not on file   Occupational History    Not on file   Tobacco Use    Smoking status: Every Day     Current packs/day: 0.50     Average packs/day: 0.5 packs/day for 40.0 years (20.0 ttl pk-yrs)     Types: Cigarettes    Smokeless tobacco: Never   Vaping Use    Vaping status: Never Used   Substance and Sexual Activity    Alcohol use: Yes     Comment: Soc    Drug use: No    Sexual activity: Not on file   Other Topics Concern    Not on file   Social History Narrative    Not on file     Social Drivers of Health     Financial Resource Strain: Low Risk  (10/14/2024)    Overall Financial Resource Strain (CARDIA)     Difficulty of Paying Living Expenses: Not hard at all   Food Insecurity: No Food Insecurity (2/28/2025)    Hunger Vital Sign     Worried About Running Out of Food in the Last Year: Never true     Ran Out of Food in the Last Year: Never true   Transportation Needs: No Transportation Needs (2/28/2025)    PRAPARE - Transportation

## 2025-04-23 LAB
ALBUMIN/CREAT UR: 11 MG/G CREAT (ref 0–29)
CREAT UR-MCNC: 32.2 MG/DL
MICROALBUMIN UR-MCNC: 3.7 UG/ML

## 2025-06-24 ENCOUNTER — TELEPHONE (OUTPATIENT)
Dept: PHARMACY | Facility: CLINIC | Age: 60
End: 2025-06-24

## 2025-06-24 NOTE — TELEPHONE ENCOUNTER
[facility] CLINICAL PHARMACY: ADHERENCE REVIEW  Identified care gap per [place]: fills at [facility]: Diabetes adherence    Patient also appears to be prescribed: Statin    ASSESSMENT  DIABETES ADHERENCE    Insurance Records claims through 25 (Prior Year PDC = not reported; YTD PDC = FIRST FILL; Potential Fail Date: 25):   Metformin  mg tablets last filled on 2 for 90 day supply. Next refill due: 25  One refill remaining    Prescribed si tablet/capsule twice daily    Per Insurer Portal: Lantus last filled on 10/24/24    POC A1c 7.9% 25    NOTE: A1c <9%    STATIN ADHERENCE    Per Insurer Portal: atorvastatin 80 mg last filled on 10/24/24 for 90 day supply.   Per chart, refills on file- filled 25 but claim reversed     Lab Results   Component Value Date    CHOL 135 2024    TRIG 95 2024    HDL 51 2024     Lab Results   Component Value Date    LDL 66 2024      ALT   Date Value Ref Range Status   2024 24 0 - 32 IU/L Final     AST   Date Value Ref Range Status   2024 17 0 - 40 IU/L Final     The ASCVD Risk score (Alize DK, et al., 2019) failed to calculate for the following reasons:    Risk score cannot be calculated because patient has a medical history suggesting prior/existing ASCVD     PLAN  LIS- YES    The following are interventions that have been identified:   Patient OVERDUE refilling metformin and atorvastatin and active on home medication list.  Ready at [facility] pharmacy for     Attempting to reach patient to review - unable to leave message. Letter sent to patient.     Last Visit: 25  Next Visit: 25  Future Appointments   Date Time Provider Department Center   2025 10:00 AM Guido Ontiveros MD [facility] AMB   2025 11:45 AM Maxime Killian MD [facility] DEP   2025  9:30 AM Maxime Killian MD [facility] DEP       Alfreda Phan, PharmD, RPh, BCPS  [facility]

## 2025-07-16 LAB
CHOLESTEROL, TOTAL: 192 MG/DL (ref 110–200)
CHOLESTEROL/HDL RATIO: 2.8 (ref 0–5)
HDLC SERPL-MCNC: 68 MG/DL
LDL CHOLESTEROL: 106 MG/DL (ref 50–99)
LDL/HDL RATIO: 1.6
NON-HDL CHOLESTEROL: 124 MG/DL
TRIGL SERPL-MCNC: 88 MG/DL (ref 40–149)
VLDLC SERPL CALC-MCNC: 18 MG/DL (ref 8–30)

## 2025-07-17 ENCOUNTER — RESULTS FOLLOW-UP (OUTPATIENT)
Facility: CLINIC | Age: 60
End: 2025-07-17

## 2025-07-21 ENCOUNTER — OFFICE VISIT (OUTPATIENT)
Facility: CLINIC | Age: 60
End: 2025-07-21
Payer: MEDICARE

## 2025-07-21 VITALS
DIASTOLIC BLOOD PRESSURE: 76 MMHG | HEIGHT: 65 IN | OXYGEN SATURATION: 100 % | WEIGHT: 135 LBS | BODY MASS INDEX: 22.49 KG/M2 | HEART RATE: 70 BPM | TEMPERATURE: 97.6 F | RESPIRATION RATE: 20 BRPM | SYSTOLIC BLOOD PRESSURE: 159 MMHG

## 2025-07-21 DIAGNOSIS — E11.22 TYPE 2 DIABETES MELLITUS WITH CHRONIC KIDNEY DISEASE, WITHOUT LONG-TERM CURRENT USE OF INSULIN, UNSPECIFIED CKD STAGE (HCC): Primary | ICD-10-CM

## 2025-07-21 DIAGNOSIS — G89.29 CHRONIC LEFT SHOULDER PAIN: ICD-10-CM

## 2025-07-21 DIAGNOSIS — K21.9 GASTRO-ESOPHAGEAL REFLUX DISEASE WITHOUT ESOPHAGITIS: ICD-10-CM

## 2025-07-21 DIAGNOSIS — M25.512 CHRONIC LEFT SHOULDER PAIN: ICD-10-CM

## 2025-07-21 DIAGNOSIS — E78.5 HYPERLIPIDEMIA, UNSPECIFIED HYPERLIPIDEMIA TYPE: ICD-10-CM

## 2025-07-21 DIAGNOSIS — I10 ESSENTIAL (PRIMARY) HYPERTENSION: ICD-10-CM

## 2025-07-21 LAB — HBA1C MFR BLD: 6.9 %

## 2025-07-21 PROCEDURE — 3078F DIAST BP <80 MM HG: CPT | Performed by: FAMILY MEDICINE

## 2025-07-21 PROCEDURE — 83036 HEMOGLOBIN GLYCOSYLATED A1C: CPT | Performed by: FAMILY MEDICINE

## 2025-07-21 PROCEDURE — 3044F HG A1C LEVEL LT 7.0%: CPT | Performed by: FAMILY MEDICINE

## 2025-07-21 PROCEDURE — 99214 OFFICE O/P EST MOD 30 MIN: CPT | Performed by: FAMILY MEDICINE

## 2025-07-21 PROCEDURE — 3077F SYST BP >= 140 MM HG: CPT | Performed by: FAMILY MEDICINE

## 2025-07-21 RX ORDER — LISINOPRIL 10 MG/1
10 TABLET ORAL DAILY
Qty: 90 TABLET | Refills: 1 | Status: SHIPPED | OUTPATIENT
Start: 2025-07-21

## 2025-07-21 RX ORDER — ATORVASTATIN CALCIUM 80 MG/1
80 TABLET, FILM COATED ORAL DAILY
Qty: 90 TABLET | Refills: 1 | Status: SHIPPED | OUTPATIENT
Start: 2025-07-21

## 2025-07-21 NOTE — PROGRESS NOTES
Have you been to the ER, urgent care clinic since your last visit?  Hospitalized since your last visit?   NO    Have you seen or consulted any other health care providers outside our system since your last visit?   NO      “Have you had a diabetic eye exam?”    NO     Date of last diabetic eye exam: 10/28/2020

## 2025-07-21 NOTE — PROGRESS NOTES
HPI  Mandy Lora comes in for follow up care.  DM2: Patient has type 2 diabetes mellitus.  Her blood glucose numbers have been stable.  Patient is on metformin and insulin.  Her HbA1c is 6.9.  She will intensify lifestyle and dietary modification.   HTN: Patient has hypertension.  Patient is on HCTZ.  Her blood pressure is elevated today.  I will add lisinopril 10 mg daily.  Patient will continue with the HCTZ 12.5 mg daily.  She will keep a blood pressure log.  She will take a low-sodium diet.  I will follow-up at next visit.  GERD: Patient has gastroesophageal reflux disease.  Denies dark stools or hematemesis.  Patient is on Protonix 40 mg daily.  Continue current treatment plan.  Dyslipidemia: Patient has dyslipidemia.  Patient is on Lipitor 80 mg daily.  Will continue current treatment plan.  Patient should exercise and take a diet low in polysaturated fats.  Left shoulder pain: Patient has chronic left shoulder pain.  She has limited range of motion.  This is on overhead reach.  I will order an x-ray of the shoulder.  Patient will be referred to an orthopedist.       Past Medical History  Past Medical History:   Diagnosis Date    Depression     Diabetes (HCC)     Hyperlipidemia     Hypertension     Stroke (HCC)     2012       Surgical History  History reviewed. No pertinent surgical history.     Medications  Current Outpatient Medications   Medication Sig Dispense Refill    pantoprazole (PROTONIX) 40 MG tablet Take 1 tablet by mouth 2 times daily 60 tablet 2    atorvastatin (LIPITOR) 80 MG tablet Take 1 tablet by mouth daily 90 tablet 1    hydroCHLOROthiazide 12.5 MG tablet Take 1 tablet by mouth daily 90 tablet 1    insulin glargine (LANTUS SOLOSTAR) 100 UNIT/ML injection pen Inject 15 Units into the skin nightly (Patient taking differently: Inject 20 Units into the skin nightly) 30 mL 1    metFORMIN (GLUCOPHAGE) 1000 MG tablet TAKE 1 TABLET BY MOUTH TWICE DAILY WITH MEALS 180 tablet 1    acetaminophen

## 2025-07-31 ENCOUNTER — RESULTS FOLLOW-UP (OUTPATIENT)
Facility: CLINIC | Age: 60
End: 2025-07-31